# Patient Record
Sex: FEMALE | Race: AMERICAN INDIAN OR ALASKA NATIVE | NOT HISPANIC OR LATINO | URBAN - METROPOLITAN AREA
[De-identification: names, ages, dates, MRNs, and addresses within clinical notes are randomized per-mention and may not be internally consistent; named-entity substitution may affect disease eponyms.]

---

## 2022-03-02 ENCOUNTER — INPATIENT (INPATIENT)
Facility: HOSPITAL | Age: 87
LOS: 6 days | Discharge: HOME | DRG: 291 | End: 2022-03-09
Attending: HOSPITALIST | Admitting: HOSPITALIST
Payer: COMMERCIAL

## 2022-03-02 VITALS
HEART RATE: 55 BPM | SYSTOLIC BLOOD PRESSURE: 149 MMHG | RESPIRATION RATE: 18 BRPM | HEIGHT: 60 IN | OXYGEN SATURATION: 94 % | TEMPERATURE: 98 F | DIASTOLIC BLOOD PRESSURE: 74 MMHG

## 2022-03-02 PROCEDURE — 99285 EMERGENCY DEPT VISIT HI MDM: CPT

## 2022-03-02 PROCEDURE — 71045 X-RAY EXAM CHEST 1 VIEW: CPT | Mod: 26

## 2022-03-02 RX ORDER — SODIUM CHLORIDE 9 MG/ML
3 INJECTION INTRAMUSCULAR; INTRAVENOUS; SUBCUTANEOUS EVERY 8 HOURS
Refills: 0 | Status: DISCONTINUED | OUTPATIENT
Start: 2022-03-02 | End: 2022-03-09

## 2022-03-02 NOTE — ED PROVIDER NOTE - OBJECTIVE STATEMENT
89 y/o female with history of DM, HTN, CVA, no history of heart problems or blood clots, p/w several days of worsening shortness of breath with some intermittent diarrhea and no other associated symptoms. Patient noted to have O2 saturation of 90 at home prompting ER visit. Patient denies fever, chest pain, cough, nausea, vomiting, diarrhea, urinary symptoms, focal numbness/weakness syncope, or any other recent illnesses and hospitalizations.

## 2022-03-02 NOTE — ED PROVIDER NOTE - CLINICAL SUMMARY MEDICAL DECISION MAKING FREE TEXT BOX
Patient p/w shortness of breath, exam consistent with CHF, will investigate other etiologies. Will get EKG, labs, and CXR. Patient stable and will reassess. Patient p/w shortness of breath, exam consistent with CHF, will investigate other etiologies. Will get EKG, labs, and CXR. Patient stable and will reassess.    CXR showing edema with bilat effusions. Labs showing Hb 7.8,  otw unremarkable. EKG unremarkable. Treating for pulm edema. Pt stable and endorsed to inpatient team.

## 2022-03-02 NOTE — ED PROVIDER NOTE - PHYSICAL EXAMINATION
Vital Signs Reviewed--86% on RA, 95% on 3L  GEN: Comfortable, NAD, AAOx3  HEENT: NCAT, MMM, Neck Supple  RESP: Diffuse bilateral rales with mild tachypnea speaking in full sentences  CV: RRR, S1S2, No murmurs  ABD: No TTP, Soft, ND, No masses, No CVA Tenderness  Extrem/Skin: 2+ pitting edema in bilateral lower legs. Equal pulses bilat, No cyanosis/rashes  Neuro: No focal deficits

## 2022-03-03 DIAGNOSIS — J96.01 ACUTE RESPIRATORY FAILURE WITH HYPOXIA: ICD-10-CM

## 2022-03-03 DIAGNOSIS — Z29.9 ENCOUNTER FOR PROPHYLACTIC MEASURES, UNSPECIFIED: ICD-10-CM

## 2022-03-03 DIAGNOSIS — D64.9 ANEMIA, UNSPECIFIED: ICD-10-CM

## 2022-03-03 DIAGNOSIS — I63.9 CEREBRAL INFARCTION, UNSPECIFIED: ICD-10-CM

## 2022-03-03 DIAGNOSIS — J81.1 CHRONIC PULMONARY EDEMA: ICD-10-CM

## 2022-03-03 DIAGNOSIS — R09.89 OTHER SPECIFIED SYMPTOMS AND SIGNS INVOLVING THE CIRCULATORY AND RESPIRATORY SYSTEMS: ICD-10-CM

## 2022-03-03 DIAGNOSIS — E11.9 TYPE 2 DIABETES MELLITUS WITHOUT COMPLICATIONS: ICD-10-CM

## 2022-03-03 DIAGNOSIS — I50.9 HEART FAILURE, UNSPECIFIED: ICD-10-CM

## 2022-03-03 DIAGNOSIS — I10 ESSENTIAL (PRIMARY) HYPERTENSION: ICD-10-CM

## 2022-03-03 LAB
A1C WITH ESTIMATED AVERAGE GLUCOSE RESULT: 6.3 % — HIGH (ref 4–5.6)
ALBUMIN SERPL ELPH-MCNC: 3 G/DL — LOW (ref 3.5–5)
ALBUMIN SERPL ELPH-MCNC: 3.3 G/DL — LOW (ref 3.5–5)
ALP SERPL-CCNC: 104 U/L — SIGNIFICANT CHANGE UP (ref 40–120)
ALP SERPL-CCNC: 98 U/L — SIGNIFICANT CHANGE UP (ref 40–120)
ALT FLD-CCNC: 14 U/L DA — SIGNIFICANT CHANGE UP (ref 10–60)
ALT FLD-CCNC: 15 U/L DA — SIGNIFICANT CHANGE UP (ref 10–60)
ANION GAP SERPL CALC-SCNC: 6 MMOL/L — SIGNIFICANT CHANGE UP (ref 5–17)
ANION GAP SERPL CALC-SCNC: 7 MMOL/L — SIGNIFICANT CHANGE UP (ref 5–17)
APTT BLD: 26.3 SEC — LOW (ref 27.5–35.5)
APTT BLD: 29.5 SEC — SIGNIFICANT CHANGE UP (ref 27.5–35.5)
AST SERPL-CCNC: 12 U/L — SIGNIFICANT CHANGE UP (ref 10–40)
AST SERPL-CCNC: 16 U/L — SIGNIFICANT CHANGE UP (ref 10–40)
BASOPHILS # BLD AUTO: 0.03 K/UL — SIGNIFICANT CHANGE UP (ref 0–0.2)
BASOPHILS # BLD AUTO: 0.04 K/UL — SIGNIFICANT CHANGE UP (ref 0–0.2)
BASOPHILS NFR BLD AUTO: 0.4 % — SIGNIFICANT CHANGE UP (ref 0–2)
BASOPHILS NFR BLD AUTO: 0.5 % — SIGNIFICANT CHANGE UP (ref 0–2)
BILIRUB SERPL-MCNC: 0.5 MG/DL — SIGNIFICANT CHANGE UP (ref 0.2–1.2)
BILIRUB SERPL-MCNC: 0.6 MG/DL — SIGNIFICANT CHANGE UP (ref 0.2–1.2)
BLD GP AB SCN SERPL QL: SIGNIFICANT CHANGE UP
BUN SERPL-MCNC: 23 MG/DL — HIGH (ref 7–18)
BUN SERPL-MCNC: 25 MG/DL — HIGH (ref 7–18)
CALCIUM SERPL-MCNC: 8.7 MG/DL — SIGNIFICANT CHANGE UP (ref 8.4–10.5)
CALCIUM SERPL-MCNC: 8.8 MG/DL — SIGNIFICANT CHANGE UP (ref 8.4–10.5)
CHLORIDE SERPL-SCNC: 101 MMOL/L — SIGNIFICANT CHANGE UP (ref 96–108)
CHLORIDE SERPL-SCNC: 102 MMOL/L — SIGNIFICANT CHANGE UP (ref 96–108)
CHOLEST SERPL-MCNC: 160 MG/DL — SIGNIFICANT CHANGE UP
CO2 SERPL-SCNC: 23 MMOL/L — SIGNIFICANT CHANGE UP (ref 22–31)
CO2 SERPL-SCNC: 23 MMOL/L — SIGNIFICANT CHANGE UP (ref 22–31)
CREAT SERPL-MCNC: 0.86 MG/DL — SIGNIFICANT CHANGE UP (ref 0.5–1.3)
CREAT SERPL-MCNC: 0.88 MG/DL — SIGNIFICANT CHANGE UP (ref 0.5–1.3)
D DIMER BLD IA.RAPID-MCNC: 483 NG/ML DDU — HIGH
EGFR: 63 ML/MIN/1.73M2 — SIGNIFICANT CHANGE UP
EGFR: 65 ML/MIN/1.73M2 — SIGNIFICANT CHANGE UP
EOSINOPHIL # BLD AUTO: 0.23 K/UL — SIGNIFICANT CHANGE UP (ref 0–0.5)
EOSINOPHIL # BLD AUTO: 0.28 K/UL — SIGNIFICANT CHANGE UP (ref 0–0.5)
EOSINOPHIL NFR BLD AUTO: 2.7 % — SIGNIFICANT CHANGE UP (ref 0–6)
EOSINOPHIL NFR BLD AUTO: 3.7 % — SIGNIFICANT CHANGE UP (ref 0–6)
ESTIMATED AVERAGE GLUCOSE: 134 MG/DL — HIGH (ref 68–114)
FERRITIN SERPL-MCNC: 14 NG/ML — LOW (ref 15–150)
GLUCOSE SERPL-MCNC: 148 MG/DL — HIGH (ref 70–99)
GLUCOSE SERPL-MCNC: 99 MG/DL — SIGNIFICANT CHANGE UP (ref 70–99)
HCT VFR BLD CALC: 24.6 % — LOW (ref 34.5–45)
HCT VFR BLD CALC: 25.2 % — LOW (ref 34.5–45)
HDLC SERPL-MCNC: 96 MG/DL — SIGNIFICANT CHANGE UP
HGB BLD-MCNC: 7.8 G/DL — LOW (ref 11.5–15.5)
HGB BLD-MCNC: 8 G/DL — LOW (ref 11.5–15.5)
IMM GRANULOCYTES NFR BLD AUTO: 0.7 % — SIGNIFICANT CHANGE UP (ref 0–1.5)
IMM GRANULOCYTES NFR BLD AUTO: 0.8 % — SIGNIFICANT CHANGE UP (ref 0–1.5)
INR BLD: 1 RATIO — SIGNIFICANT CHANGE UP (ref 0.88–1.16)
INR BLD: 1.03 RATIO — SIGNIFICANT CHANGE UP (ref 0.88–1.16)
IRON SATN MFR SERPL: 29 UG/DL — LOW (ref 40–150)
IRON SATN MFR SERPL: 8 % — LOW (ref 15–50)
LDH SERPL L TO P-CCNC: 184 U/L — SIGNIFICANT CHANGE UP (ref 120–225)
LIPID PNL WITH DIRECT LDL SERPL: 57 MG/DL — SIGNIFICANT CHANGE UP
LYMPHOCYTES # BLD AUTO: 1.13 K/UL — SIGNIFICANT CHANGE UP (ref 1–3.3)
LYMPHOCYTES # BLD AUTO: 1.28 K/UL — SIGNIFICANT CHANGE UP (ref 1–3.3)
LYMPHOCYTES # BLD AUTO: 15 % — SIGNIFICANT CHANGE UP (ref 13–44)
LYMPHOCYTES # BLD AUTO: 15.1 % — SIGNIFICANT CHANGE UP (ref 13–44)
MAGNESIUM SERPL-MCNC: 2 MG/DL — SIGNIFICANT CHANGE UP (ref 1.6–2.6)
MCHC RBC-ENTMCNC: 28.2 PG — SIGNIFICANT CHANGE UP (ref 27–34)
MCHC RBC-ENTMCNC: 29.2 PG — SIGNIFICANT CHANGE UP (ref 27–34)
MCHC RBC-ENTMCNC: 31 GM/DL — LOW (ref 32–36)
MCHC RBC-ENTMCNC: 32.5 GM/DL — SIGNIFICANT CHANGE UP (ref 32–36)
MCV RBC AUTO: 89.8 FL — SIGNIFICANT CHANGE UP (ref 80–100)
MCV RBC AUTO: 91 FL — SIGNIFICANT CHANGE UP (ref 80–100)
MONOCYTES # BLD AUTO: 0.77 K/UL — SIGNIFICANT CHANGE UP (ref 0–0.9)
MONOCYTES # BLD AUTO: 0.79 K/UL — SIGNIFICANT CHANGE UP (ref 0–0.9)
MONOCYTES NFR BLD AUTO: 10.2 % — SIGNIFICANT CHANGE UP (ref 2–14)
MONOCYTES NFR BLD AUTO: 9.3 % — SIGNIFICANT CHANGE UP (ref 2–14)
NEUTROPHILS # BLD AUTO: 5.26 K/UL — SIGNIFICANT CHANGE UP (ref 1.8–7.4)
NEUTROPHILS # BLD AUTO: 6.09 K/UL — SIGNIFICANT CHANGE UP (ref 1.8–7.4)
NEUTROPHILS NFR BLD AUTO: 69.8 % — SIGNIFICANT CHANGE UP (ref 43–77)
NEUTROPHILS NFR BLD AUTO: 71.8 % — SIGNIFICANT CHANGE UP (ref 43–77)
NON HDL CHOLESTEROL: 64 MG/DL — SIGNIFICANT CHANGE UP
NRBC # BLD: 0 /100 WBCS — SIGNIFICANT CHANGE UP (ref 0–0)
NRBC # BLD: 0 /100 WBCS — SIGNIFICANT CHANGE UP (ref 0–0)
NT-PROBNP SERPL-SCNC: 915 PG/ML — HIGH (ref 0–450)
PHOSPHATE SERPL-MCNC: 4.7 MG/DL — HIGH (ref 2.5–4.5)
PLATELET # BLD AUTO: 393 K/UL — SIGNIFICANT CHANGE UP (ref 150–400)
PLATELET # BLD AUTO: 404 K/UL — HIGH (ref 150–400)
POTASSIUM SERPL-MCNC: 4.7 MMOL/L — SIGNIFICANT CHANGE UP (ref 3.5–5.3)
POTASSIUM SERPL-MCNC: 5.2 MMOL/L — SIGNIFICANT CHANGE UP (ref 3.5–5.3)
POTASSIUM SERPL-SCNC: 4.7 MMOL/L — SIGNIFICANT CHANGE UP (ref 3.5–5.3)
POTASSIUM SERPL-SCNC: 5.2 MMOL/L — SIGNIFICANT CHANGE UP (ref 3.5–5.3)
PROT SERPL-MCNC: 6.7 G/DL — SIGNIFICANT CHANGE UP (ref 6–8.3)
PROT SERPL-MCNC: 6.9 G/DL — SIGNIFICANT CHANGE UP (ref 6–8.3)
PROTHROM AB SERPL-ACNC: 11.9 SEC — SIGNIFICANT CHANGE UP (ref 10.5–13.4)
PROTHROM AB SERPL-ACNC: 12.3 SEC — SIGNIFICANT CHANGE UP (ref 10.5–13.4)
RBC # BLD: 2.74 M/UL — LOW (ref 3.8–5.2)
RBC # BLD: 2.74 M/UL — LOW (ref 3.8–5.2)
RBC # BLD: 2.77 M/UL — LOW (ref 3.8–5.2)
RBC # FLD: 13.9 % — SIGNIFICANT CHANGE UP (ref 10.3–14.5)
RBC # FLD: 13.9 % — SIGNIFICANT CHANGE UP (ref 10.3–14.5)
RETICS #: 58.9 K/UL — SIGNIFICANT CHANGE UP (ref 25–125)
RETICS/RBC NFR: 2.2 % — SIGNIFICANT CHANGE UP (ref 0.5–2.5)
SARS-COV-2 RNA SPEC QL NAA+PROBE: SIGNIFICANT CHANGE UP
SODIUM SERPL-SCNC: 131 MMOL/L — LOW (ref 135–145)
SODIUM SERPL-SCNC: 131 MMOL/L — LOW (ref 135–145)
TIBC SERPL-MCNC: 374 UG/DL — SIGNIFICANT CHANGE UP (ref 250–450)
TRIGL SERPL-MCNC: 36 MG/DL — SIGNIFICANT CHANGE UP
TROPONIN I, HIGH SENSITIVITY RESULT: 22.3 NG/L — SIGNIFICANT CHANGE UP
UIBC SERPL-MCNC: 345 UG/DL — SIGNIFICANT CHANGE UP (ref 110–370)
WBC # BLD: 7.54 K/UL — SIGNIFICANT CHANGE UP (ref 3.8–10.5)
WBC # BLD: 8.48 K/UL — SIGNIFICANT CHANGE UP (ref 3.8–10.5)
WBC # FLD AUTO: 7.54 K/UL — SIGNIFICANT CHANGE UP (ref 3.8–10.5)
WBC # FLD AUTO: 8.48 K/UL — SIGNIFICANT CHANGE UP (ref 3.8–10.5)

## 2022-03-03 PROCEDURE — 93306 TTE W/DOPPLER COMPLETE: CPT | Mod: 26

## 2022-03-03 PROCEDURE — 99223 1ST HOSP IP/OBS HIGH 75: CPT

## 2022-03-03 PROCEDURE — 93970 EXTREMITY STUDY: CPT | Mod: 26

## 2022-03-03 RX ORDER — FUROSEMIDE 40 MG
80 TABLET ORAL ONCE
Refills: 0 | Status: COMPLETED | OUTPATIENT
Start: 2022-03-03 | End: 2022-03-03

## 2022-03-03 RX ORDER — ENOXAPARIN SODIUM 100 MG/ML
40 INJECTION SUBCUTANEOUS EVERY 24 HOURS
Refills: 0 | Status: DISCONTINUED | OUTPATIENT
Start: 2022-03-04 | End: 2022-03-09

## 2022-03-03 RX ORDER — ENOXAPARIN SODIUM 100 MG/ML
60 INJECTION SUBCUTANEOUS EVERY 12 HOURS
Refills: 0 | Status: DISCONTINUED | OUTPATIENT
Start: 2022-03-03 | End: 2022-03-03

## 2022-03-03 RX ORDER — CLOPIDOGREL BISULFATE 75 MG/1
75 TABLET, FILM COATED ORAL DAILY
Refills: 0 | Status: DISCONTINUED | OUTPATIENT
Start: 2022-03-03 | End: 2022-03-09

## 2022-03-03 RX ORDER — FUROSEMIDE 40 MG
40 TABLET ORAL
Refills: 0 | Status: DISCONTINUED | OUTPATIENT
Start: 2022-03-03 | End: 2022-03-05

## 2022-03-03 RX ADMIN — Medication 80 MILLIGRAM(S): at 01:52

## 2022-03-03 RX ADMIN — ENOXAPARIN SODIUM 40 MILLIGRAM(S): 100 INJECTION SUBCUTANEOUS at 23:48

## 2022-03-03 RX ADMIN — SODIUM CHLORIDE 3 MILLILITER(S): 9 INJECTION INTRAMUSCULAR; INTRAVENOUS; SUBCUTANEOUS at 14:13

## 2022-03-03 RX ADMIN — SODIUM CHLORIDE 3 MILLILITER(S): 9 INJECTION INTRAMUSCULAR; INTRAVENOUS; SUBCUTANEOUS at 05:38

## 2022-03-03 RX ADMIN — ENOXAPARIN SODIUM 60 MILLIGRAM(S): 100 INJECTION SUBCUTANEOUS at 12:47

## 2022-03-03 RX ADMIN — SODIUM CHLORIDE 3 MILLILITER(S): 9 INJECTION INTRAMUSCULAR; INTRAVENOUS; SUBCUTANEOUS at 23:33

## 2022-03-03 RX ADMIN — Medication 40 MILLIGRAM(S): at 18:24

## 2022-03-03 NOTE — PATIENT PROFILE ADULT - CAREGIVER NAME
Naman PGY2: Patient approached for NGT placement, states he feels improved and wants to decline NGT at this time unless pain reoccurs, abd now softer, still very mild distension, surgery eval pending Reji Tejada Naman PGY2: surgery at bedside evaluating pt Naman PGY2: Patient seen by surgery, pain is resolved and pt ambulatory without difficulty, abd soft, ntnd, per Naman PGY2: Patient noted increased pain 4-5/10, unable to po challenge, relayed to surgery concern for pain, lack of passage of flatus, possible persisting sbo, per surgery will come re-eval pt

## 2022-03-03 NOTE — CONSULT NOTE ADULT - PROBLEM SELECTOR RECOMMENDATION 4
h/o HTN, on unknown dose of lisinopril and amlodipine    - hold home medications for now  - monitor BP

## 2022-03-03 NOTE — H&P ADULT - RS GEN PE MLT RESP DETAILS PC
respirations non-labored/no intercostal retractions/diminished breath sounds, L/diminished breath sounds, R

## 2022-03-03 NOTE — CHART NOTE - NSCHARTNOTEFT_GEN_A_CORE
81 year old female  with PMH of DM2, HTN, ischemic CVA presented with  progressive SOB and B/L LE swelling for few weeks   In ED: Pro BNP - 915  CXR B/L pleural effusion and widespread pulmonary vascular congestion.  hypoxic, placed on supplemental oxygen   Troponin negative     Admitted for acute hypoxic respiratory failure requiring oxygen likely new onset of CHF   Monitored on telemetry. Started on lasix             OBJECTIVE:  Vital Signs Last 24 Hrs  T(C): 36.3 (03 Mar 2022 07:01), Max: 36.4 (02 Mar 2022 21:03)  T(F): 97.4 (03 Mar 2022 07:01), Max: 97.6 (02 Mar 2022 21:03)  HR: 76 (03 Mar 2022 07:01) (55 - 76)  BP: 166/72 (03 Mar 2022 07:01) (149/64 - 166/72)  BP(mean): --  RR: 18 (03 Mar 2022 07:01) (18 - 18)  SpO2: 100% (03 Mar 2022 07:01) (94% - 100%)    FOCUSED PHYSICAL EXAM:  Neuro: awake, alert, oriented x 2, disoriented to time, [place], No neuro deficit  Cardiovascular: Pulses +2 B/L in lower and upper extremities, HR regular, BP stable, + LE edema.  Respiratory: Respirations regular, unlabored, breath sounds decreased bilaterally   GI: Abdomen soft, non-tender, positive bowel sounds.  : no bladder distention noted. No complaints at this time.  Skin: Dry, intact, no bruising, no diaphoresis.    PLAN       - Admit to telemetry   - IV diuresis: Lasix 40mg q 12 hourly  - follow up Ddimer, if elevated will do CTA ro PE   - Check daily weights  - monitor I/O,  fluid intake restriction   - follow up ECHO   - B/L US duplex ro  DVT   - monitor  H/H, f/u FOBT   - Cardiology consulted - Dr Tovar   - c/w home meds. 81 year old female  with PMH of DM2, HTN, ischemic CVA presented with  progressive SOB and B/L LE swelling for few weeks   In ED: Pro BNP - 915  CXR B/L pleural effusion and widespread pulmonary vascular congestion.  hypoxic, placed on supplemental oxygen   Troponin negative     Admitted for acute hypoxic respiratory failure requiring oxygen likely new onset of CHF   Monitored on telemetry. Started on lasix   Pending ddimer and venous doppler to ro DVT, started empirically on full does lovenox             OBJECTIVE:  Vital Signs Last 24 Hrs  T(C): 36.3 (03 Mar 2022 07:01), Max: 36.4 (02 Mar 2022 21:03)  T(F): 97.4 (03 Mar 2022 07:01), Max: 97.6 (02 Mar 2022 21:03)  HR: 76 (03 Mar 2022 07:01) (55 - 76)  BP: 166/72 (03 Mar 2022 07:01) (149/64 - 166/72)  BP(mean): --  RR: 18 (03 Mar 2022 07:01) (18 - 18)  SpO2: 100% (03 Mar 2022 07:01) (94% - 100%)    FOCUSED PHYSICAL EXAM:  Neuro: awake, alert, oriented x 2, disoriented to time, [place], No neuro deficit  Cardiovascular: Pulses +2 B/L in lower and upper extremities, HR regular, BP stable, + LE edema.  Respiratory: Respirations regular, unlabored, breath sounds decreased bilaterally   GI: Abdomen soft, non-tender, positive bowel sounds.  : no bladder distention noted. No complaints at this time.  Skin: Dry, intact, no bruising, no diaphoresis.    PLAN       - Admit to telemetry   - IV diuresis: Lasix 40mg q 12 hourly  - follow up Ddimer, if elevated will do CTA ro PE   - Check daily weights  - monitor I/O,  fluid intake restriction   - follow up ECHO   - B/L US duplex ro  DVT   - Lovenox 60 mg BID for now   - monitor  H/H, f/u FOBT   - Cardiology consulted - Dr Tovar   - c/w [place] meds. 81 year old female  with PMH of DM2, HTN, ischemic CVA presented with  progressive SOB and B/L LE swelling for few weeks   In ED: Pro BNP - 915  CXR B/L pleural effusion and widespread pulmonary vascular congestion.  hypoxic, placed on supplemental oxygen   Troponin negative     Admitted for acute hypoxic respiratory failure requiring oxygen likely new onset of CHF   Monitored on telemetry. Started on lasix   Pending ddimer and venous doppler to ro DVT, started empirically on full does lovenox             OBJECTIVE:  Vital Signs Last 24 Hrs  T(C): 36.3 (03 Mar 2022 07:01), Max: 36.4 (02 Mar 2022 21:03)  T(F): 97.4 (03 Mar 2022 07:01), Max: 97.6 (02 Mar 2022 21:03)  HR: 76 (03 Mar 2022 07:01) (55 - 76)  BP: 166/72 (03 Mar 2022 07:01) (149/64 - 166/72)  BP(mean): --  RR: 18 (03 Mar 2022 07:01) (18 - 18)  SpO2: 100% (03 Mar 2022 07:01) (94% - 100%)    FOCUSED PHYSICAL EXAM:  Neuro: awake, alert, oriented x 2, disoriented to time, [place], No neuro deficit  Cardiovascular: Pulses +2 B/L in lower and upper extremities, HR regular, BP stable, + LE edema.  Respiratory: Respirations regular, unlabored, breath sounds decreased bilaterally   GI: Abdomen soft, non-tender, positive bowel sounds.  : no bladder distention noted. No complaints at this time.  Skin: Dry, intact, no bruising, no diaphoresis.    PLAN       - Admit to telemetry   - IV diuresis: Lasix 40mg q 12 hourly  - follow up Ddimer, if elevated will do CTA ro PE   - Check daily weights  - monitor I/O,  fluid intake restriction   - follow up ECHO   - B/L US duplex ro  DVT   - Lovenox 60 mg BID for now   - monitor  H/H, f/u FOBT   - Cardiology consulted - Dr Tovar   - c/w home meds.    Attending addendum:  Patient was seen and examined at bedside. Reports SOB has improved slightly from yesterday. Reports orthopnea and PND at home.     Vitals noted     P/E:  NAD, laying flat on bed   on 2L NC saturating 100%  AAOx3, no new focal deficit   BL crackles, right >left   Abd soft, non tender, BS present   S1S2 WNL, no MRG   BL LE 1+ edema   Fernando catheter in place    Labs noted     A/P:  Acute hypoxic respiratory failure: cont NC as needed to keep O2 sat>94%  Possible new onset of HF: S/s, CXR, elevated BNP. Will cont Lasix 40mg BID, strict I/O, daily weight, water and salt restriction, cont tele, pending TTE, appreciate cardio consult  R/O DVT/PE: D-dimer (age adjusted) not elevated, LE doppler neg, symptoms improved with Lasix. Changed Lovenox to ppx dose   Please send anemia w/u, obtain CT abd and pelvis without contrast to r/o malignancy  Hb stable, cont home dose Plavix for h/o stroke  Rest of the management as above

## 2022-03-03 NOTE — H&P ADULT - ASSESSMENT
Patient is a 89yoF, AAOx3 and ambulates w/ walker, w/ PMH of CVA, HTN, DM, p/w SOB for a week. Admitted for acute hypoxic respiratory failure requiring oxygen supplementation

## 2022-03-03 NOTE — H&P ADULT - MUSCULOSKELETAL
detailed exam ROM intact/no joint swelling/no joint erythema/no joint warmth/normal strength/calf tenderness

## 2022-03-03 NOTE — PATIENT PROFILE ADULT - NSTRANSFERBELONGINGSDISPO_GEN_A_NUR
Family will bring in items tomorrow. Advised to show to staff for property accountability./not applicable

## 2022-03-03 NOTE — CONSULT NOTE ADULT - PROBLEM SELECTOR RECOMMENDATION 2
Patient with acute hypoxic respiratory failure associated with bilateral pitting edema, saturating well on 4 Lpm via NC  no known history of CHF  ProBNP 915, CXR with pulmonary vascular congestion  s/p IV lasix 80 mg In ED    new-onset CHF vs PE?  s/p IV lasix 80 mg in ED  started on IV lasix 40 mg bid    pending US duplex b/l LE r/o DVT and TTE Patient with acute hypoxic respiratory failure associated with bilateral pitting edema, saturating well on 4 Lpm via NC  no known history of CHF  ProBNP 915, CXR with pulmonary vascular congestion  s/p IV lasix 80 mg In ED    new-onset CHF vs PE? - pt with many risk factors to develop CHF including anemia, hypertension  s/p IV lasix 80 mg in ED  started on IV lasix 40 mg bid    - Obtain TTE to evaluate for new onset CHF  - Continue IV diuresis  - daily weights, strict I&Os, fluid restriction

## 2022-03-03 NOTE — H&P ADULT - PROBLEM SELECTOR PLAN 4
- h/o CVA, on plavix at home  - hold plavix given anemia - h/o HTN, on unknown dose of lisinopril and amlodipine  - hold home medications for now  - primary team to follow up on home dose

## 2022-03-03 NOTE — PATIENT PROFILE ADULT - FALL HARM RISK - HARM RISK INTERVENTIONS

## 2022-03-03 NOTE — H&P ADULT - PROBLEM SELECTOR PLAN 5
- h/o DM, recently d/c on metfomin by PCP per pt  - c/w ISS  - FS qACHs  - f/u a1c - h/o CVA, on plavix at home  - hold plavix given anemia

## 2022-03-03 NOTE — H&P ADULT - PROBLEM SELECTOR PLAN 1
- p/w progressive SOB x1wk  - ddx: PE vs new-onset CHF vs symptomatic anemia  - PE: decreased breath sounds b/l, pitting edema b/l (R>L), positive rashard sign  - hgb 7.8 (unknown baseline)  -   - CXR pulmonary congestion (f/u official reading)  - s/p furosemide 80 in ED  - c/w oxygen supp  - telemonitoring  - f/u dimer, LE doppler, FOBT, retic, bili    - Cardio, Dr. Tovar, consulted  - hold empiric AC given anemia  - primary team obtain CTA if indicated

## 2022-03-03 NOTE — ED ADULT NURSE NOTE - OBJECTIVE STATEMENT
Pt presents to the ED with c/o SOB today. As per daughter-in-law, pt's O2 dropped to 90% on RA at home. Pt denies fever, chest pain, nausea, or vomiting.

## 2022-03-03 NOTE — CONSULT NOTE ADULT - SUBJECTIVE AND OBJECTIVE BOX
CHIEF COMPLAINT & BRIEF HOSPITAL COURSE:  89yoF, AAOx3 and ambulates w/ walker, w/ PMH of CVA, HTN, DM, p/w SOB for a week. She reports progressive SOB. She states she was unable to lay flat due to difficulty of breathing, and she is unable to walk due to SOB. She also endorses recent swelling of her leg bilaterally (R>L). She states no trigger events, which includes no recent sickness. She denies fever, chills, n/v, chest pain, abdominal pain, urinary or bowel movement changes.    Subjective:    REVIEW OF SYSTEMS:  CONSTITUTIONAL: No fever, weight loss, or fatigue  RESPIRATORY: No cough, wheezing, chills or hemoptysis; No shortness of breath  CARDIOVASCULAR: No chest pain, palpitations, dizziness, or leg swelling  GASTROINTESTINAL: No abdominal pain. No nausea, vomiting, or hematemesis; No diarrhea or constipation. No melena or hematochezia.  GENITOURINARY: No dysuria or hematuria, urinary frequency  NEUROLOGICAL: No headaches, memory loss, loss of strength, numbness, or tremors  SKIN: No itching, burning, rashes, or lesions     MEDICATIONS  (STANDING):  furosemide   Injectable 40 milliGRAM(s) IV Push two times a day  sodium chloride 0.9% lock flush 3 milliLiter(s) IV Push every 8 hours    MEDICATIONS  (PRN):      Vital Signs Last 24 Hrs  T(C): 36.3 (03 Mar 2022 07:01), Max: 36.4 (02 Mar 2022 21:03)  T(F): 97.4 (03 Mar 2022 07:01), Max: 97.6 (02 Mar 2022 21:03)  HR: 76 (03 Mar 2022 07:01) (55 - 76)  BP: 166/72 (03 Mar 2022 07:01) (149/64 - 166/72)  BP(mean): --  RR: 18 (03 Mar 2022 07:01) (18 - 18)  SpO2: 100% (03 Mar 2022 07:01) (94% - 100%)    PHYSICAL EXAMINATION:  GENERAL: NAD, well built  HEAD:  Atraumatic, Normocephalic  EYES:  conjunctiva and sclera clear  NECK: Supple, No JVD, Normal thyroid  CHEST/LUNG: Clear to auscultation. Clear to percussion bilaterally; No rales, rhonchi, wheezing, or rubs  HEART: Regular rate and rhythm; No murmurs, rubs, or gallops  ABDOMEN: Soft, Nontender, Nondistended; Bowel sounds present, no pain or masses on palpation  NERVOUS SYSTEM:  Alert & Oriented X3  : voiding well  EXTREMITIES:  2+ Peripheral Pulses, No clubbing, cyanosis, or edema  SKIN: warm dry                          7.8    8.48  )-----------( 393      ( 03 Mar 2022 00:44 )             25.2     03-03    131<L>  |  102  |  25<H>  ----------------------------<  99  5.2   |  23  |  0.88    Ca    8.7      03 Mar 2022 00:44    TPro  6.9  /  Alb  3.3<L>  /  TBili  0.5  /  DBili  x   /  AST  16  /  ALT  15  /  AlkPhos  104  03-03    LIVER FUNCTIONS - ( 03 Mar 2022 00:44 )  Alb: 3.3 g/dL / Pro: 6.9 g/dL / ALK PHOS: 104 U/L / ALT: 15 U/L DA / AST: 16 U/L / GGT: x               PT/INR - ( 03 Mar 2022 00:44 )   PT: 11.9 sec;   INR: 1.00 ratio         PTT - ( 03 Mar 2022 00:44 )  PTT:29.5 sec    I&O's Summary          CAPILLARY BLOOD GLUCOSE      RADIOLOGY & ADDITIONAL TESTS:                     CHIEF COMPLAINT & BRIEF HOSPITAL COURSE:  89yoF, AAOx3 and ambulates w/ walker, w/ PMH of CVA, HTN, DM, p/w SOB for a week. She reports progressive SOB. She states she was unable to lay flat due to difficulty of breathing, and she is unable to walk due to SOB. She also endorses recent swelling of her leg bilaterally (R>L). She states no trigger events, which includes no recent sickness. She denies fever, chills, n/v, chest pain, abdominal pain, urinary or bowel movement changes.    Subjective: Reports improved shortness of breath on oxygen. Also mentions right lower leg pain with touch. Denies other complaints    REVIEW OF SYSTEMS:  CONSTITUTIONAL: No fever, weight loss, or fatigue  RESPIRATORY: No cough, wheezing, chills or hemoptysis; No shortness of breath  CARDIOVASCULAR: No chest pain, palpitations, dizziness, or leg swelling  GASTROINTESTINAL: No abdominal pain. No nausea, vomiting, or hematemesis; No diarrhea or constipation. No melena or hematochezia.  GENITOURINARY: No dysuria or hematuria, urinary frequency  NEUROLOGICAL: No headaches, memory loss, loss of strength, numbness, or tremors  SKIN: No itching, burning, rashes, or lesions     MEDICATIONS  (STANDING):  furosemide   Injectable 40 milliGRAM(s) IV Push two times a day  sodium chloride 0.9% lock flush 3 milliLiter(s) IV Push every 8 hours    MEDICATIONS  (PRN):      Vital Signs Last 24 Hrs  T(C): 36.3 (03 Mar 2022 07:01), Max: 36.4 (02 Mar 2022 21:03)  T(F): 97.4 (03 Mar 2022 07:01), Max: 97.6 (02 Mar 2022 21:03)  HR: 76 (03 Mar 2022 07:01) (55 - 76)  BP: 166/72 (03 Mar 2022 07:01) (149/64 - 166/72)  BP(mean): --  RR: 18 (03 Mar 2022 07:01) (18 - 18)  SpO2: 100% (03 Mar 2022 07:01) (94% - 100%)    PHYSICAL EXAMINATION:  GENERAL: NAD, well built, elderly female on nasal cannula  HEAD:  Atraumatic, Normocephalic  EYES:  conjunctiva and sclera clear  NECK: Supple, No JVD, Normal thyroid  CHEST/LUNG: Clear to auscultation b/l  HEART: Regular rate and rhythm; No murmurs, rubs, or gallops  ABDOMEN: Soft, Nontender, Nondistended; Bowel sounds present, no pain or masses on palpation  NERVOUS SYSTEM:  Alert & Oriented X3  : voiding well  EXTREMITIES:  2+ Peripheral Pulses, No clubbing, cyanosis, 2+ pitting edema RLE to mid shin with erythema, tenderness to palpation, + homans sign. and 1+ edema LLE without erythema or tenderness to palpation  SKIN: warm dry                          7.8    8.48  )-----------( 393      ( 03 Mar 2022 00:44 )             25.2     03-03    131<L>  |  102  |  25<H>  ----------------------------<  99  5.2   |  23  |  0.88    Ca    8.7      03 Mar 2022 00:44    TPro  6.9  /  Alb  3.3<L>  /  TBili  0.5  /  DBili  x   /  AST  16  /  ALT  15  /  AlkPhos  104  03-03    LIVER FUNCTIONS - ( 03 Mar 2022 00:44 )  Alb: 3.3 g/dL / Pro: 6.9 g/dL / ALK PHOS: 104 U/L / ALT: 15 U/L DA / AST: 16 U/L / GGT: x               PT/INR - ( 03 Mar 2022 00:44 )   PT: 11.9 sec;   INR: 1.00 ratio         PTT - ( 03 Mar 2022 00:44 )  PTT:29.5 sec    I&O's Summary          CAPILLARY BLOOD GLUCOSE      RADIOLOGY & ADDITIONAL TESTS:

## 2022-03-03 NOTE — H&P ADULT - HISTORY OF PRESENT ILLNESS
Patient is a 89yoF, AAOx3 and ambulates w/ walker, w/ PMH of CVA, HTN, DM, p/w SOB for a week. She reports progressive SOB. She states she was unable to lay flat due to difficulty of breathing, and she is unable to walk due to SOB. She also endorses recent swelling of her leg bilaterally (R>L). She states no trigger events, which includes no recent sickness. She denies fever, chills, n/v, chest pain, abdominal pain, urinary or bowel movement changes.

## 2022-03-03 NOTE — CONSULT NOTE ADULT - PROBLEM SELECTOR RECOMMENDATION 9
Shortness of breath for 1 week associated with bilateral pitting edema and + homans sign  hypoxia on admission, now saturating at 100% on 4 Lpm  ProBNP 915  CXR with pulmonary vascular congestion    new-onset CHF vs PE?  s/p IV lasix 80 mg in ED  started on IV lasix 40 mg bid    pending US duplex b/l LE r/o DVT and TTE Shortness of breath for 1 week associated with bilateral pitting edema and Right + homans sign  hypoxia on admission, now saturating at 100% on 4 Lpm  ProBNP 915, no D-dimer obtained  CXR with pulmonary vascular congestion    new-onset CHF vs PE?  s/p IV lasix 80 mg in ED  started on IV lasix 40 mg bid    - pending US duplex b/l LE r/o DVT   - Start empiric full dose anticoagulation with lovenox given + homans sign and shortness of breath  - CT angio of the chest to rule out PE  - Obtain TTE to rule out new-onset CHF  - continue IV diuresis, daily weights, strict I&Os, fluid restriction

## 2022-03-03 NOTE — ED ADULT NURSE NOTE - NSIMPLEMENTINTERV_GEN_ALL_ED
Implemented All Fall with Harm Risk Interventions:  [place] to call system. Call bell, personal items and telephone within reach. Instruct patient to call for assistance. Room bathroom lighting operational. Non-slip footwear when patient is off stretcher. Physically safe environment: no spills, clutter or unnecessary equipment. Stretcher in lowest position, wheels locked, appropriate side rails in place. Provide visual cue, wrist band, yellow gown, etc. Monitor gait and stability. Monitor for mental status changes and reorient to person, place, and time. Review medications for side effects contributing to fall risk. Reinforce activity limits and safety measures with patient and family. Provide visual clues: red socks.

## 2022-03-03 NOTE — H&P ADULT - PROBLEM SELECTOR PLAN 3
- h/o HTN, on unknown dose of lisinopril and amlodipine  - hold home medications for now  - primary team to follow up on home dose - as above

## 2022-03-03 NOTE — H&P ADULT - ATTENDING COMMENTS
Pt seen at bedside  Case discussed with MAR.  81 year old woman with PMH of DM2, HTN, ischemic CVA here with some days of progressive SOB and B/L LE swelling. No other findings on ROS.    Vital Signs Last 24 Hrs  T(C): 36.3 (03 Mar 2022 01:09), Max: 36.4 (02 Mar 2022 21:03)  T(F): 97.3 (03 Mar 2022 01:09), Max: 97.6 (02 Mar 2022 21:03)  HR: 55 (03 Mar 2022 01:09) (55 - 55)  BP: 149/64 (03 Mar 2022 01:09) (149/64 - 149/74)  RR: 18 (03 Mar 2022 01:09) (18 - 18)  SpO2: 100% (03 Mar 2022 01:09) (94% - 100%)    Labs                         7.8    8.48  )-----------( 393      ( 03 Mar 2022 00:44 )             25.2     03-03    131<L>  |  102  |  25<H>  ----------------------------<  99  5.2   |  23  |  0.88    Ca    8.7      03 Mar 2022 00:44  TPro  6.9  /  Alb  3.3<L>  /  TBili  0.5  /  DBili  x   /  AST  16  /  ALT  15  /  AlkPhos  104  03-03    Pro BNP - 915    CXR B/L pleural effusion and widespread pulmonary vascular congestion.    Impression   Pt with no prior documented hx of CHF here with symptoms and signs of CHF exacerbation. Concern for high output cardiac failure also from severe anemia with hgb of 7.8 with unknown baseline.     - Acute CHF exacerbation   - Anemia- acute vs chronic   - DM  - HTN     Plan  - Admit to Medicine   - IV diuresis with Lasix 40mg q 12 hourly  - Check daily weights, I/O closely, fluid intake restriction, CHF diet  - ECHO, A1c, lipid   - Serial H/H - check FOBT, reticulocyte count; normal total bilirubin; less likely hemolytic  - Type and screen   - Cardiology consult - Dr Brenner.  - Resume home meds Pt seen at bedside  Case discussed with MAR.  81 year old woman with PMH of DM2, HTN, ischemic CVA here with some days of progressive SOB and B/L LE swelling. No other findings on ROS.    Vital Signs Last 24 Hrs  T(C): 36.3 (03 Mar 2022 01:09), Max: 36.4 (02 Mar 2022 21:03)  T(F): 97.3 (03 Mar 2022 01:09), Max: 97.6 (02 Mar 2022 21:03)  HR: 55 (03 Mar 2022 01:09) (55 - 55)  BP: 149/64 (03 Mar 2022 01:09) (149/64 - 149/74)  RR: 18 (03 Mar 2022 01:09) (18 - 18)  SpO2: 100% (03 Mar 2022 01:09) (94% - 100%)    Labs                         7.8    8.48  )-----------( 393      ( 03 Mar 2022 00:44 )             25.2     03-03    131<L>  |  102  |  25<H>  ----------------------------<  99  5.2   |  23  |  0.88    Ca    8.7      03 Mar 2022 00:44  TPro  6.9  /  Alb  3.3<L>  /  TBili  0.5  /  DBili  x   /  AST  16  /  ALT  15  /  AlkPhos  104  03-03    Pro BNP - 915    CXR B/L pleural effusion and widespread pulmonary vascular congestion.    Impression   Pt with no prior documented hx of CHF here with symptoms and signs of CHF exacerbation. Concern for high output cardiac failure also from severe anemia with hgb of 7.8 with unknown baseline.     - Acute CHF exacerbation   - Acute respiratory failure with hypoxia  - Anemia- acute vs chronic vs acute on chronic  - DM  - HTN     Plan  - Admit to Medicine   - IV diuresis with Lasix 40mg q 12 hourly  - Check daily weights, I/O closely, fluid intake restriction, CHF diet  - ECHO, A1c, lipid   - B/L DVT studies  - Serial H/H - check FOBT, reticulocyte count; normal total bilirubin; less likely hemolytic  - Type and screen   - Cardiology consult - Dr Brenner.  - Resume home meds

## 2022-03-04 DIAGNOSIS — M79.2 NEURALGIA AND NEURITIS, UNSPECIFIED: ICD-10-CM

## 2022-03-04 LAB
ANION GAP SERPL CALC-SCNC: 7 MMOL/L — SIGNIFICANT CHANGE UP (ref 5–17)
BUN SERPL-MCNC: 25 MG/DL — HIGH (ref 7–18)
CALCIUM SERPL-MCNC: 8.4 MG/DL — SIGNIFICANT CHANGE UP (ref 8.4–10.5)
CHLORIDE SERPL-SCNC: 101 MMOL/L — SIGNIFICANT CHANGE UP (ref 96–108)
CO2 SERPL-SCNC: 26 MMOL/L — SIGNIFICANT CHANGE UP (ref 22–31)
CREAT SERPL-MCNC: 0.91 MG/DL — SIGNIFICANT CHANGE UP (ref 0.5–1.3)
EGFR: 60 ML/MIN/1.73M2 — SIGNIFICANT CHANGE UP
GLUCOSE BLDC GLUCOMTR-MCNC: 155 MG/DL — HIGH (ref 70–99)
GLUCOSE SERPL-MCNC: 80 MG/DL — SIGNIFICANT CHANGE UP (ref 70–99)
HCT VFR BLD CALC: 23.5 % — LOW (ref 34.5–45)
HGB BLD-MCNC: 7.7 G/DL — LOW (ref 11.5–15.5)
MCHC RBC-ENTMCNC: 29.4 PG — SIGNIFICANT CHANGE UP (ref 27–34)
MCHC RBC-ENTMCNC: 32.8 GM/DL — SIGNIFICANT CHANGE UP (ref 32–36)
MCV RBC AUTO: 89.7 FL — SIGNIFICANT CHANGE UP (ref 80–100)
NRBC # BLD: 0 /100 WBCS — SIGNIFICANT CHANGE UP (ref 0–0)
PLATELET # BLD AUTO: 401 K/UL — HIGH (ref 150–400)
POTASSIUM SERPL-MCNC: 4.7 MMOL/L — SIGNIFICANT CHANGE UP (ref 3.5–5.3)
POTASSIUM SERPL-SCNC: 4.7 MMOL/L — SIGNIFICANT CHANGE UP (ref 3.5–5.3)
RBC # BLD: 2.62 M/UL — LOW (ref 3.8–5.2)
RBC # FLD: 14 % — SIGNIFICANT CHANGE UP (ref 10.3–14.5)
SODIUM SERPL-SCNC: 134 MMOL/L — LOW (ref 135–145)
WBC # BLD: 9.49 K/UL — SIGNIFICANT CHANGE UP (ref 3.8–10.5)
WBC # FLD AUTO: 9.49 K/UL — SIGNIFICANT CHANGE UP (ref 3.8–10.5)

## 2022-03-04 PROCEDURE — 99233 SBSQ HOSP IP/OBS HIGH 50: CPT | Mod: GC

## 2022-03-04 RX ORDER — ACETAMINOPHEN 500 MG
650 TABLET ORAL EVERY 6 HOURS
Refills: 0 | Status: DISCONTINUED | OUTPATIENT
Start: 2022-03-04 | End: 2022-03-09

## 2022-03-04 RX ORDER — ACETAMINOPHEN 500 MG
1000 TABLET ORAL ONCE
Refills: 0 | Status: COMPLETED | OUTPATIENT
Start: 2022-03-04 | End: 2022-03-04

## 2022-03-04 RX ORDER — GABAPENTIN 400 MG/1
300 CAPSULE ORAL
Refills: 0 | Status: DISCONTINUED | OUTPATIENT
Start: 2022-03-04 | End: 2022-03-09

## 2022-03-04 RX ORDER — ACETAMINOPHEN 500 MG
650 TABLET ORAL EVERY 6 HOURS
Refills: 0 | Status: DISCONTINUED | OUTPATIENT
Start: 2022-03-04 | End: 2022-03-04

## 2022-03-04 RX ADMIN — Medication 1000 MILLIGRAM(S): at 15:00

## 2022-03-04 RX ADMIN — GABAPENTIN 300 MILLIGRAM(S): 400 CAPSULE ORAL at 17:33

## 2022-03-04 RX ADMIN — SODIUM CHLORIDE 3 MILLILITER(S): 9 INJECTION INTRAMUSCULAR; INTRAVENOUS; SUBCUTANEOUS at 06:29

## 2022-03-04 RX ADMIN — Medication 650 MILLIGRAM(S): at 11:30

## 2022-03-04 RX ADMIN — ENOXAPARIN SODIUM 40 MILLIGRAM(S): 100 INJECTION SUBCUTANEOUS at 23:32

## 2022-03-04 RX ADMIN — Medication 650 MILLIGRAM(S): at 18:30

## 2022-03-04 RX ADMIN — Medication 40 MILLIGRAM(S): at 06:28

## 2022-03-04 RX ADMIN — CLOPIDOGREL BISULFATE 75 MILLIGRAM(S): 75 TABLET, FILM COATED ORAL at 12:31

## 2022-03-04 RX ADMIN — Medication 40 MILLIGRAM(S): at 17:32

## 2022-03-04 RX ADMIN — Medication 400 MILLIGRAM(S): at 14:23

## 2022-03-04 RX ADMIN — SODIUM CHLORIDE 3 MILLILITER(S): 9 INJECTION INTRAMUSCULAR; INTRAVENOUS; SUBCUTANEOUS at 14:26

## 2022-03-04 RX ADMIN — SODIUM CHLORIDE 3 MILLILITER(S): 9 INJECTION INTRAMUSCULAR; INTRAVENOUS; SUBCUTANEOUS at 22:14

## 2022-03-04 RX ADMIN — Medication 650 MILLIGRAM(S): at 10:24

## 2022-03-04 RX ADMIN — Medication 650 MILLIGRAM(S): at 17:32

## 2022-03-04 RX ADMIN — GABAPENTIN 300 MILLIGRAM(S): 400 CAPSULE ORAL at 10:25

## 2022-03-04 RX ADMIN — Medication 650 MILLIGRAM(S): at 23:30

## 2022-03-04 NOTE — PROGRESS NOTE ADULT - SUBJECTIVE AND OBJECTIVE BOX
PGY-1 Progress Note discussed with attending    PAGER #: [[phone]] TILL 5:00 PM  PLEASE CONTACT ON CALL TEAM:  - On Call Team (Please refer to Joe) FROM 5:00 PM - 8:30PM  - Nightfloat Team FROM 8:30 -7:30 AM    INTERVAL HPI/ OVERNIGHT EVENTS: No acute events overnight.       REVIEW OF SYSTEMS:  CONSTITUTIONAL: No fever, weight loss, or fatigue  RESPIRATORY: No cough, wheezing, chills or hemoptysis; No shortness of breath  CARDIOVASCULAR: No chest pain, palpitations, dizziness, or leg swelling  GASTROINTESTINAL: No abdominal pain. No nausea, vomiting, or hematemesis; No diarrhea or constipation. No melena or hematochezia.  GENITOURINARY: No dysuria or hematuria, urinary frequency  NEUROLOGICAL: No headaches, memory loss, loss of strength, numbness, or tremors  SKIN: No itching, burning, rashes, or lesions     MEDICATIONS  (STANDING):  clopidogrel Tablet 75 milliGRAM(s) Oral daily  enoxaparin Injectable 40 milliGRAM(s) SubCutaneous every 24 hours  furosemide   Injectable 40 milliGRAM(s) IV Push two times a day  gabapentin 300 milliGRAM(s) Oral two times a day  sodium chloride 0.9% lock flush 3 milliLiter(s) IV Push every 8 hours    MEDICATIONS  (PRN):  acetaminophen     Tablet .. 650 milliGRAM(s) Oral every 6 hours PRN Mild Pain (1 - 3)      Vital Signs Last 24 Hrs  T(C): 36.5 (04 Mar 2022 07:53), Max: 36.8 (04 Mar 2022 00:13)  T(F): 97.7 (04 Mar 2022 07:53), Max: 98.2 (04 Mar 2022 00:13)  HR: 77 (04 Mar 2022 07:53) (54 - 77)  BP: 139/51 (04 Mar 2022 07:53) (104/52 - 144/66)  BP(mean): --  RR: 18 (04 Mar 2022 07:53) (16 - 18)  SpO2: 100% (04 Mar 2022 07:53) (99% - 100%)    PHYSICAL EXAMINATION:  GENERAL: NAD, AAOx3  HEAD: AT/NC  EYES: conjunctiva and sclera clear  NECK: supple, No JVD noted, Normal thyroid  CHEST/LUNG: CTABL; no rales, rhonchi, wheezing, or rubs  HEART: regular rate and rhythm; no murmurs, rubs, or gallops  ABDOMEN: soft, nontender, nondistended; Bowel sounds present  EXTREMITIES: B/L LE edema and B/L LE tenderness   SKIN: warm dry                          7.7    9.49  )-----------( 401      ( 04 Mar 2022 07:24 )             23.5     03-04    134<L>  |  101  |  25<H>  ----------------------------<  80  4.7   |  26  |  0.91    Ca    8.4      04 Mar 2022 07:24  Phos  4.7     03-03  Mg     2.0     03-03    TPro  6.7  /  Alb  3.0<L>  /  TBili  0.6  /  DBili  x   /  AST  12  /  ALT  14  /  AlkPhos  98  03-03    LIVER FUNCTIONS - ( 03 Mar 2022 11:00 )  Alb: 3.0 g/dL / Pro: 6.7 g/dL / ALK PHOS: 98 U/L / ALT: 14 U/L DA / AST: 12 U/L / GGT: x               PT/INR - ( 03 Mar 2022 10:59 )   PT: 12.3 sec;   INR: 1.03 ratio         PTT - ( 03 Mar 2022 10:59 )  PTT:26.3 sec  COVID-19 PCR: NotDetec (03 Mar 2022 00:44)      CAPILLARY BLOOD GLUCOSE          RADIOLOGY & ADDITIONAL TESTS:

## 2022-03-04 NOTE — PROGRESS NOTE ADULT - PROBLEM SELECTOR PLAN 1
- p/w progressive SOB x1wk  - ddx: PE vs new-onset CHF vs symptomatic anemia  - PE: decreased breath sounds b/l, pitting edema b/l (R>L), positive rashard sign  - hgb 7.8 (unknown baseline)  -   - CXR pulmonary congestion   - s/p furosemide 80 in ED  - c/w oxygen supp, wean off as tolerated   - telemonitoring  - dimer 483, LE doppler neg   - Cardio, Dr. Tovar, consulted  - hold empiric AC given anemia  -now resolved. c/w 40mg IV lasix BID

## 2022-03-04 NOTE — PROGRESS NOTE ADULT - PROBLEM SELECTOR PLAN 5
- h/o HTN, on unknown dose of lisinopril and amlodipine  - hold home medications for now  - primary team to follow up on home dose

## 2022-03-04 NOTE — PROGRESS NOTE ADULT - SUBJECTIVE AND OBJECTIVE BOX
DATE OF SERVICE:  3/4/2022  Patient was seen and examined on 3/4/2022    .Interim events noted.Consultant notes ,Labs,Telemetry reviewed by me       HOSPITAL COURSE: HPI:  Patient is a 89yoF, AAOx3 and ambulates w/ walker, w/ PMH of CVA, HTN, DM, p/w SOB for a week. She reports progressive SOB. She states she was unable to lay flat due to difficulty of breathing, and she is unable to walk due to SOB. She also endorses recent swelling of her leg bilaterally (R>L). She states no trigger events, which includes no recent sickness. She denies fever, chills, n/v, chest pain, abdominal pain, urinary or bowel movement changes. (03 Mar 2022 03:38)      INTERIM EVENTS:Patient seen at bedside ,interim events noted.      PMH -reviewed admission note, no change since admission  HEART FAILURE: Acute[ ]Chronic[ ] Systolic[ ] Diastolic[ ] Combined Systolic and Diastolic[ ]  CAD[ ] CABG[ ] PCI[ ]  DEVICES[ ] PPM[ ] ICD[ ] ILR[ ]  ATRIAL FIBRILLATION[ ] Paroxysmal[ ] Permanent[ ]  LYNN[ ] CKD1[ ] CKD2[ ] CKD3[ ] CKD4[ ] ESRD[ ]  COPD[ ] HTN[ ]   DM[ ] Type1[ ] Type 2[ ]   CVA[ ] Paresis[ ]    AMBULATION: Assisted[ ] Cane/walker[ ] Independent[ ]    MEDICATIONS  (STANDING):  acetaminophen     Tablet .. 650 milliGRAM(s) Oral every 6 hours  clopidogrel Tablet 75 milliGRAM(s) Oral daily  enoxaparin Injectable 40 milliGRAM(s) SubCutaneous every 24 hours  furosemide   Injectable 40 milliGRAM(s) IV Push two times a day  gabapentin 300 milliGRAM(s) Oral two times a day  sodium chloride 0.9% lock flush 3 milliLiter(s) IV Push every 8 hours    MEDICATIONS  (PRN):            REVIEW OF SYSTEMS:  Constitutional: [ ] fever, [ ]weight loss,  [ ]fatigue  Eyes: [ ] visual changes  Respiratory: [ ]shortness of breath;  [ ] cough, [ ]wheezing, [ ]chills, [ ]hemoptysis  Cardiovascular: [ ] chest pain, [ ]palpitations, [ ]dizziness,  [ ]leg swelling[ ]orthopnea[ ]PND  Gastrointestinal: [ ] abdominal pain, [ ]nausea, [ ]vomiting,  [ ]diarrhea [ ]Constipation [ ]Melena  Genitourinary: [ ] dysuria, [ ] hematuria [ ]Fernando  Neurologic: [ ] headaches [ ] tremors[ ]weakness [ ]Paralysis Right[ ] Left[ ]  Skin: [ ] itching, [ ]burning, [ ] rashes  Endocrine: [ ] heat or cold intolerance  Musculoskeletal: [ ] joint pain or swelling; [ ] muscle, back, or extremity pain  Psychiatric: [ ] depression, [ ]anxiety, [ ]mood swings, or [ ]difficulty sleeping  Hematologic: [ ] easy bruising, [ ] bleeding gums    [ ] All remaining systems negative except as per above.   [ ]Unable to obtain.  [x] No change in ROS since admission      Vital Signs Last 24 Hrs  T(C): 36.6 (04 Mar 2022 16:48), Max: 36.8 (04 Mar 2022 00:13)  T(F): 97.8 (04 Mar 2022 16:48), Max: 98.2 (04 Mar 2022 00:13)  HR: 65 (04 Mar 2022 16:48) (58 - 77)  BP: 125/46 (04 Mar 2022 16:48) (100/40 - 144/66)  BP(mean): --  RR: 18 (04 Mar 2022 16:48) (16 - 18)  SpO2: 96% (04 Mar 2022 16:48) (96% - 100%)  I&O's Summary    03 Mar 2022 07:01  -  04 Mar 2022 07:00  --------------------------------------------------------  IN: 0 mL / OUT: 1900 mL / NET: -1900 mL    04 Mar 2022 07:01  -  04 Mar 2022 18:07  --------------------------------------------------------  IN: 1200 mL / OUT: 500 mL / NET: 700 mL        PHYSICAL EXAM:  General: No acute distress BMI-  HEENT: EOMI, PERRL  Neck: Supple, [ ] JVD  Lungs: Equal air entry bilaterally; [ ] rales [ ] wheezing [ ] rhonchi  Heart: Regular rate and rhythm; [x ] murmur   2/6 [ x] systolic [ ] diastolic [ ] radiation[ ] rubs [ ]  gallops  Abdomen: Nontender, bowel sounds present  Extremities: No clubbing, cyanosis, [ ] edema [ ]Pulses  equal and intact  Nervous system:  Alert & Oriented X3, no focal deficits  Psychiatric: Normal affect  Skin: No rashes or lesions    LABS:  03-04    134<L>  |  101  |  25<H>  ----------------------------<  80  4.7   |  26  |  0.91    Ca    8.4      04 Mar 2022 07:24  Phos  4.7     03-03  Mg     2.0     03-03    TPro  6.7  /  Alb  3.0<L>  /  TBili  0.6  /  DBili  x   /  AST  12  /  ALT  14  /  AlkPhos  98  03-03    Creatinine Trend: 0.91<--, 0.86<--, 0.88<--                        7.7    9.49  )-----------( 401      ( 04 Mar 2022 07:24 )             23.5     PT/INR - ( 03 Mar 2022 10:59 )   PT: 12.3 sec;   INR: 1.03 ratio         PTT - ( 03 Mar 2022 10:59 )  PTT:26.3 sec    Serum Pro-Brain Natriuretic Peptide: 915 pg/mL (03-03-22 @ 00:44)

## 2022-03-04 NOTE — PROGRESS NOTE ADULT - ATTENDING COMMENTS
Patient was seen and examined at bedside   Reports SOB has improved, saturating >94% on RA, no SOB on minimum exertion   Complains of BL knee pain (patient reports chronic arthritis)    Vitals noted     P/E:  NAD, pleasant lady   AAOx3, no focal deficit   S1S2 WNL, no MRG   BL scattered crepitations in lungs   Abd soft, non tender, BS present   BL LE no edema   BL Knee tenderness, no erythema or swelling     labs noted     A/P:  acute hypoxic respiratory failure resolved   Possible new onset Heart failure   Chronic anemia   BL knee pain due to OA  HTN  H/o TIA   DM    Plan:   Off O2, monitor respiratory status  Cont diuresis with lasix 40mg BID  Repeat CXR tomorrow   STEVEN veliz, monitor I/O with prima fit   daily weight   Salt and water restriction  TTE   Cont tele   Cont plavix  Hb stable   CT abd and pelvis for anemia   Follow anemia w/u  Tylenol for knee pain, avoid nsaid  Cont home dose Gabapentin  ISS  OOB to chair   Full code  Plan of care was discussed with patient; all questions and concerns were addressed and care was aligned with patient's wishes  Discussed with HS Patient was seen and examined at bedside   Reports SOB has improved, saturating >94% on RA, no SOB on minimum exertion   Complains of BL knee pain (patient reports chronic arthritis)    Vitals noted     P/E:  NAD, pleasant lady   AAOx3, no focal deficit   S1S2 WNL, no MRG   BL scattered crepitations in lungs   Abd soft, non tender, BS present   BL LE no edema   BL Knee tenderness, no erythema or swelling     labs noted     A/P:  acute hypoxic respiratory failure resolved   Possible new onset Heart failure   Chronic anemia   BL knee pain due to OA  HTN  H/o TIA   DM    Plan:   Off O2, monitor respiratory status  Cont diuresis with lasix 40mg BID  Repeat CXR tomorrow   STEVEN veliz, monitor I/O with prima fit   daily weight   Salt and water restriction  TTE   Cont tele   Cont plavix  Hb stable   CT abd and pelvis for anemia   Follow anemia w/u  Tylenol for knee pain, avoid nsaid  Cont home dose Gabapentin  ISS  LE doppler neg  OOB to chair   Full code  Plan of care was discussed with patient and patient's caregiver Son; all questions and concerns were addressed and care was aligned with their wishes  Discussed with residents

## 2022-03-05 DIAGNOSIS — M19.90 UNSPECIFIED OSTEOARTHRITIS, UNSPECIFIED SITE: ICD-10-CM

## 2022-03-05 DIAGNOSIS — I50.31 ACUTE DIASTOLIC (CONGESTIVE) HEART FAILURE: ICD-10-CM

## 2022-03-05 DIAGNOSIS — D50.9 IRON DEFICIENCY ANEMIA, UNSPECIFIED: ICD-10-CM

## 2022-03-05 LAB
ALBUMIN SERPL ELPH-MCNC: 2.6 G/DL — LOW (ref 3.5–5)
ALP SERPL-CCNC: 91 U/L — SIGNIFICANT CHANGE UP (ref 40–120)
ALT FLD-CCNC: 14 U/L DA — SIGNIFICANT CHANGE UP (ref 10–60)
ANION GAP SERPL CALC-SCNC: 6 MMOL/L — SIGNIFICANT CHANGE UP (ref 5–17)
ANION GAP SERPL CALC-SCNC: 6 MMOL/L — SIGNIFICANT CHANGE UP (ref 5–17)
AST SERPL-CCNC: 26 U/L — SIGNIFICANT CHANGE UP (ref 10–40)
BILIRUB SERPL-MCNC: 0.6 MG/DL — SIGNIFICANT CHANGE UP (ref 0.2–1.2)
BUN SERPL-MCNC: 33 MG/DL — HIGH (ref 7–18)
BUN SERPL-MCNC: 33 MG/DL — HIGH (ref 7–18)
CALCIUM SERPL-MCNC: 8.3 MG/DL — LOW (ref 8.4–10.5)
CALCIUM SERPL-MCNC: 8.4 MG/DL — SIGNIFICANT CHANGE UP (ref 8.4–10.5)
CHLORIDE SERPL-SCNC: 97 MMOL/L — SIGNIFICANT CHANGE UP (ref 96–108)
CHLORIDE SERPL-SCNC: 99 MMOL/L — SIGNIFICANT CHANGE UP (ref 96–108)
CO2 SERPL-SCNC: 27 MMOL/L — SIGNIFICANT CHANGE UP (ref 22–31)
CO2 SERPL-SCNC: 27 MMOL/L — SIGNIFICANT CHANGE UP (ref 22–31)
CREAT SERPL-MCNC: 1.15 MG/DL — SIGNIFICANT CHANGE UP (ref 0.5–1.3)
CREAT SERPL-MCNC: 1.17 MG/DL — SIGNIFICANT CHANGE UP (ref 0.5–1.3)
EGFR: 45 ML/MIN/1.73M2 — LOW
EGFR: 46 ML/MIN/1.73M2 — LOW
GLUCOSE SERPL-MCNC: 121 MG/DL — HIGH (ref 70–99)
GLUCOSE SERPL-MCNC: 84 MG/DL — SIGNIFICANT CHANGE UP (ref 70–99)
HCT VFR BLD CALC: 23.1 % — LOW (ref 34.5–45)
HGB BLD-MCNC: 7.6 G/DL — LOW (ref 11.5–15.5)
MAGNESIUM SERPL-MCNC: 2.1 MG/DL — SIGNIFICANT CHANGE UP (ref 1.6–2.6)
MCHC RBC-ENTMCNC: 29.3 PG — SIGNIFICANT CHANGE UP (ref 27–34)
MCHC RBC-ENTMCNC: 32.9 GM/DL — SIGNIFICANT CHANGE UP (ref 32–36)
MCV RBC AUTO: 89.2 FL — SIGNIFICANT CHANGE UP (ref 80–100)
NRBC # BLD: 0 /100 WBCS — SIGNIFICANT CHANGE UP (ref 0–0)
PHOSPHATE SERPL-MCNC: 4.9 MG/DL — HIGH (ref 2.5–4.5)
PLATELET # BLD AUTO: 378 K/UL — SIGNIFICANT CHANGE UP (ref 150–400)
POTASSIUM SERPL-MCNC: 5 MMOL/L — SIGNIFICANT CHANGE UP (ref 3.5–5.3)
POTASSIUM SERPL-MCNC: 5.4 MMOL/L — HIGH (ref 3.5–5.3)
POTASSIUM SERPL-SCNC: 5 MMOL/L — SIGNIFICANT CHANGE UP (ref 3.5–5.3)
POTASSIUM SERPL-SCNC: 5.4 MMOL/L — HIGH (ref 3.5–5.3)
PROT SERPL-MCNC: 6.5 G/DL — SIGNIFICANT CHANGE UP (ref 6–8.3)
RBC # BLD: 2.59 M/UL — LOW (ref 3.8–5.2)
RBC # FLD: 14 % — SIGNIFICANT CHANGE UP (ref 10.3–14.5)
SODIUM SERPL-SCNC: 130 MMOL/L — LOW (ref 135–145)
SODIUM SERPL-SCNC: 132 MMOL/L — LOW (ref 135–145)
WBC # BLD: 8.94 K/UL — SIGNIFICANT CHANGE UP (ref 3.8–10.5)
WBC # FLD AUTO: 8.94 K/UL — SIGNIFICANT CHANGE UP (ref 3.8–10.5)

## 2022-03-05 PROCEDURE — 74176 CT ABD & PELVIS W/O CONTRAST: CPT | Mod: 26

## 2022-03-05 PROCEDURE — 99233 SBSQ HOSP IP/OBS HIGH 50: CPT | Mod: GC

## 2022-03-05 RX ORDER — FUROSEMIDE 40 MG
40 TABLET ORAL DAILY
Refills: 0 | Status: DISCONTINUED | OUTPATIENT
Start: 2022-03-05 | End: 2022-03-07

## 2022-03-05 RX ORDER — SODIUM ZIRCONIUM CYCLOSILICATE 10 G/10G
5 POWDER, FOR SUSPENSION ORAL ONCE
Refills: 0 | Status: COMPLETED | OUTPATIENT
Start: 2022-03-05 | End: 2022-03-05

## 2022-03-05 RX ORDER — FERROUS SULFATE 325(65) MG
325 TABLET ORAL DAILY
Refills: 0 | Status: DISCONTINUED | OUTPATIENT
Start: 2022-03-05 | End: 2022-03-09

## 2022-03-05 RX ADMIN — SODIUM CHLORIDE 3 MILLILITER(S): 9 INJECTION INTRAMUSCULAR; INTRAVENOUS; SUBCUTANEOUS at 13:21

## 2022-03-05 RX ADMIN — GABAPENTIN 300 MILLIGRAM(S): 400 CAPSULE ORAL at 18:11

## 2022-03-05 RX ADMIN — Medication 650 MILLIGRAM(S): at 06:41

## 2022-03-05 RX ADMIN — SODIUM CHLORIDE 3 MILLILITER(S): 9 INJECTION INTRAMUSCULAR; INTRAVENOUS; SUBCUTANEOUS at 05:29

## 2022-03-05 RX ADMIN — GABAPENTIN 300 MILLIGRAM(S): 400 CAPSULE ORAL at 06:41

## 2022-03-05 RX ADMIN — Medication 650 MILLIGRAM(S): at 08:19

## 2022-03-05 RX ADMIN — Medication 650 MILLIGRAM(S): at 12:51

## 2022-03-05 RX ADMIN — Medication 650 MILLIGRAM(S): at 12:13

## 2022-03-05 RX ADMIN — Medication 650 MILLIGRAM(S): at 00:20

## 2022-03-05 RX ADMIN — Medication 325 MILLIGRAM(S): at 18:11

## 2022-03-05 RX ADMIN — SODIUM ZIRCONIUM CYCLOSILICATE 5 GRAM(S): 10 POWDER, FOR SUSPENSION ORAL at 10:19

## 2022-03-05 RX ADMIN — CLOPIDOGREL BISULFATE 75 MILLIGRAM(S): 75 TABLET, FILM COATED ORAL at 12:13

## 2022-03-05 NOTE — PROGRESS NOTE ADULT - PROBLEM SELECTOR PLAN 5
- h/o HTN, on unknown dose of lisinopril and amlodipine  - hold home medications for now  - primary team to follow up on home dose Tylenol PRN   Gabapentin 300mg BID

## 2022-03-05 NOTE — PROGRESS NOTE ADULT - PROBLEM SELECTOR PLAN 6
- h/o CVA, on plavix at home  -plavix restarted h/o HTN, on unknown dose of lisinopril and amlodipine  - hold home medications for now  - primary team to follow up on home dose

## 2022-03-05 NOTE — PROGRESS NOTE ADULT - PROBLEM SELECTOR PLAN 3
- as above Hgb 7.8 (unknown baseline)  held empiric AC given anemia, PE r/o   Iron low and ferritin low   started on ferrous sulfate   f/u CT abdomen/pelvis r/o occult malignancy   monitor cbc

## 2022-03-05 NOTE — PROGRESS NOTE ADULT - PROBLEM SELECTOR PLAN 7
- h/o DM, recently d/c on metfomin by PCP per pt  - c/w ISS  - FS qACHs  -A1C 6.3 - h/o CVA, on plavix at home  -plavix restarted

## 2022-03-05 NOTE — PROGRESS NOTE ADULT - PROBLEM SELECTOR PLAN 1
- p/w progressive SOB x1wk  - ddx: PE vs new-onset CHF vs symptomatic anemia  - PE: decreased breath sounds b/l, pitting edema b/l (R>L), positive rashard sign  - hgb 7.8 (unknown baseline)  -   - CXR pulmonary congestion   - s/p furosemide 80 in ED  - c/w oxygen supp, wean off as tolerated   - telemonitoring  - dimer 483, LE doppler neg   - hold empiric AC given anemia  -now resolved. c/w 40mg IV lasix BID

## 2022-03-05 NOTE — PROGRESS NOTE ADULT - PROBLEM SELECTOR PLAN 2
Plan as above h/o of OA  s/p tylenol IV x1, responded well pain improved  c/w tylenol PRN  f/u PT eval

## 2022-03-05 NOTE — PROGRESS NOTE ADULT - SUBJECTIVE AND OBJECTIVE BOX
PGY-1 Progress Note discussed with attending    PAGER #: [[phone]] TILL 5:00 PM  PLEASE CONTACT ON CALL TEAM:  - On Call Team (Please refer to Joe) FROM 5:00 PM - 8:30PM  - Nightfloat Team FROM 8:30 -7:30 AM    CHIEF COMPLAINT & BRIEF HOSPITAL COURSE:  Patient is a 89yoF, AAOx3 and ambulates w/ walker, w/ PMH of CVA, HTN, DM, p/w SOB for a week. She reports progressive SOB. She states she was unable to lay flat due to difficulty of breathing, and she is unable to walk due to SOB. She also endorses recent swelling of her leg bilaterally (R>L). She states no trigger events, which includes no recent sickness. She denies fever, chills, n/v, chest pain, abdominal pain, urinary or bowel movement changes. (03 Mar 2022 03:38)      INTERVAL HPI/OVERNIGHT EVENTS:         MEDICATIONS  (STANDING):  acetaminophen     Tablet .. 650 milliGRAM(s) Oral every 6 hours  clopidogrel Tablet 75 milliGRAM(s) Oral daily  enoxaparin Injectable 40 milliGRAM(s) SubCutaneous every 24 hours  furosemide   Injectable 40 milliGRAM(s) IV Push two times a day  gabapentin 300 milliGRAM(s) Oral two times a day  sodium chloride 0.9% lock flush 3 milliLiter(s) IV Push every 8 hours  sodium zirconium cyclosilicate 5 Gram(s) Oral once        REVIEW OF SYSTEMS:  CONSTITUTIONAL: No fever, weight loss, or fatigue  RESPIRATORY: No cough, wheezing, chills or hemoptysis; No shortness of breath  CARDIOVASCULAR: No chest pain, palpitations, dizziness, or leg swelling  GASTROINTESTINAL: No abdominal pain. No nausea, vomiting, or hematemesis; No diarrhea or constipation. No melena or hematochezia.  GENITOURINARY: No dysuria or hematuria, urinary frequency  NEUROLOGICAL: No headaches, memory loss, loss of strength, numbness, or tremors  SKIN: No itching, burning, rashes, or lesions     Vital Signs Last 24 Hrs  T(C): 36.6 (05 Mar 2022 08:00), Max: 37.6 (05 Mar 2022 00:28)  T(F): 97.8 (05 Mar 2022 08:00), Max: 99.7 (05 Mar 2022 00:28)  HR: 70 (05 Mar 2022 08:00) (60 - 90)  BP: 124/40 (05 Mar 2022 08:00) (100/40 - 125/46)  BP(mean): --  RR: 18 (05 Mar 2022 08:00) (18 - 18)  SpO2: 95% (05 Mar 2022 08:00) (94% - 98%)    PHYSICAL EXAMINATION:  GENERAL: NAD, well built  HEAD:  Atraumatic, Normocephalic  EYES:  conjunctiva and sclera clear  NECK: Supple, No JVD, Normal thyroid  CHEST/LUNG: Clear to auscultation. Clear to percussion bilaterally; No rales, rhonchi, wheezing, or rubs  HEART: Regular rate and rhythm; No murmurs, rubs, or gallops  ABDOMEN: Soft, Nontender, Nondistended; Bowel sounds present  NERVOUS SYSTEM:  Alert & Oriented X3,    EXTREMITIES:  2+ Peripheral Pulses, No clubbing, cyanosis, or edema  SKIN: warm dry                          7.6    8.94  )-----------( 378      ( 05 Mar 2022 06:34 )             23.1     03-05    132<L>  |  99  |  33<H>  ----------------------------<  84  5.4<H>   |  27  |  1.17    Ca    8.3<L>      05 Mar 2022 06:34  Phos  4.9     03-05  Mg     2.1     03-05    TPro  6.5  /  Alb  2.6<L>  /  TBili  0.6  /  DBili  x   /  AST  26  /  ALT  14  /  AlkPhos  91  03-05    LIVER FUNCTIONS - ( 05 Mar 2022 06:34 )  Alb: 2.6 g/dL / Pro: 6.5 g/dL / ALK PHOS: 91 U/L / ALT: 14 U/L DA / AST: 26 U/L / GGT: x               PT/INR - ( 03 Mar 2022 10:59 )   PT: 12.3 sec;   INR: 1.03 ratio    PTT - ( 03 Mar 2022 10:59 )  PTT:26.3 sec        CAPILLARY BLOOD GLUCOSE        RADIOLOGY & ADDITIONAL TESTS:                   PGY-1 Progress Note discussed with attending    PAGER #: [[phone]] TILL 5:00 PM  PLEASE CONTACT ON CALL TEAM:  - On Call Team (Please refer to Joe) FROM 5:00 PM - 8:30PM  - Nightfloat Team FROM 8:30 -7:30 AM    CHIEF COMPLAINT & BRIEF HOSPITAL COURSE:  Patient is a 89yoF, AAOx3 and ambulates w/ walker, w/ PMH of CVA, HTN, DM, p/w SOB for a week. She reports progressive SOB. She states she was unable to lay flat due to difficulty of breathing, and she is unable to walk due to SOB. She also endorses recent swelling of her leg bilaterally (R>L). She states no trigger events, which includes no recent sickness. She denies fever, chills, n/v, chest pain, abdominal pain, urinary or bowel movement changes. (03 Mar 2022 03:38)      INTERVAL HPI/OVERNIGHT EVENTS:   No overnight events reported. Endorses improved pain s/p Tylenol initiation. F/u PT eval.       MEDICATIONS  (STANDING):  acetaminophen     Tablet .. 650 milliGRAM(s) Oral every 6 hours  clopidogrel Tablet 75 milliGRAM(s) Oral daily  enoxaparin Injectable 40 milliGRAM(s) SubCutaneous every 24 hours  furosemide   Injectable 40 milliGRAM(s) IV Push two times a day  gabapentin 300 milliGRAM(s) Oral two times a day  sodium chloride 0.9% lock flush 3 milliLiter(s) IV Push every 8 hours  sodium zirconium cyclosilicate 5 Gram(s) Oral once        REVIEW OF SYSTEMS:  CONSTITUTIONAL: No fever, weight loss, or fatigue  RESPIRATORY: No cough, wheezing, chills or hemoptysis; No shortness of breath  CARDIOVASCULAR: No chest pain, palpitations, dizziness, or leg swelling  GASTROINTESTINAL: No abdominal pain. No nausea, vomiting, or hematemesis; No diarrhea or constipation. No melena or hematochezia.  GENITOURINARY: No dysuria or hematuria, urinary frequency  NEUROLOGICAL: No headaches, memory loss, loss of strength, numbness, or tremors  SKIN: No itching, burning, rashes, or lesions     Vital Signs Last 24 Hrs  T(C): 36.6 (05 Mar 2022 08:00), Max: 37.6 (05 Mar 2022 00:28)  T(F): 97.8 (05 Mar 2022 08:00), Max: 99.7 (05 Mar 2022 00:28)  HR: 70 (05 Mar 2022 08:00) (60 - 90)  BP: 124/40 (05 Mar 2022 08:00) (100/40 - 125/46)  BP(mean): --  RR: 18 (05 Mar 2022 08:00) (18 - 18)  SpO2: 95% (05 Mar 2022 08:00) (94% - 98%)    PHYSICAL EXAMINATION:  GENERAL: NAD, well built  HEAD:  Atraumatic, Normocephalic  EYES:  conjunctiva and sclera clear  NECK: Supple, No JVD, Normal thyroid  CHEST/LUNG: Clear to auscultation. Clear to percussion bilaterally; No rales, rhonchi, wheezing, or rubs  HEART: Regular rate and rhythm; No murmurs, rubs, or gallops  ABDOMEN: Soft, Nontender, Nondistended; Bowel sounds present  NERVOUS SYSTEM:  Alert & Oriented X3,    EXTREMITIES:  2+ Peripheral Pulses, No clubbing, cyanosis, or edema  SKIN: warm dry                          7.6    8.94  )-----------( 378      ( 05 Mar 2022 06:34 )             23.1     03-05    132<L>  |  99  |  33<H>  ----------------------------<  84  5.4<H>   |  27  |  1.17    Ca    8.3<L>      05 Mar 2022 06:34  Phos  4.9     03-05  Mg     2.1     03-05    TPro  6.5  /  Alb  2.6<L>  /  TBili  0.6  /  DBili  x   /  AST  26  /  ALT  14  /  AlkPhos  91  03-05    LIVER FUNCTIONS - ( 05 Mar 2022 06:34 )  Alb: 2.6 g/dL / Pro: 6.5 g/dL / ALK PHOS: 91 U/L / ALT: 14 U/L DA / AST: 26 U/L / GGT: x               PT/INR - ( 03 Mar 2022 10:59 )   PT: 12.3 sec;   INR: 1.03 ratio    PTT - ( 03 Mar 2022 10:59 )  PTT:26.3 sec        RADIOLOGY & ADDITIONAL TESTS:  ______________________________________________________________________________________  < from: Xray Chest 1 View-PORTABLE IMMEDIATE (Xray Chest 1 View-PORTABLE IMMEDIATE .) (03.02.22 @ 23:33) >  IMPRESSION:   Cardiomegaly.  LEFT perihilar/bibasilar mild diffuse airspace disease..  ______________________________________________________________________________________  < from: US Duplex Venous Lower Ext Complete, Bilateral (03.03.22 @ 12:07) >  IMPRESSION:  No evidence of deep venous thrombosis in either lower extremity.  ______________________________________________________________________________________  < from: Transthoracic Echocardiogram (03.03.22 @ 07:15) >  CONCLUSIONS:  1. Mild posterior mitral annular calcification. Mild mitral  regurgitation.  2. Calcified trileaflet aortic valve with normal opening.  No aortic stenosis. Trace aortic regurgitation.  3. Normal aortic root.  4. Moderately dilated left atrium.  5. Normal left ventricular internal dimensions and wall  thicknesses.  6. Normal Left Ventricular Systolic Function,  (EF = 58% by  biplane).  7. Grade II diastolic dysfunction (moderate).  8. Normal right ventricular size and function.  9. RV systolic pressure is moderately increased at  48 mm  Hg.  10. Normal tricuspid valve. Mild tricuspid regurgitation.  11. No pericardial effusion.  12. Left pleural effusion.

## 2022-03-05 NOTE — PROGRESS NOTE ADULT - PROBLEM SELECTOR PLAN 1
- p/w progressive SOB x1wk  - ddx: PE vs new-onset CHF vs symptomatic anemia  - PE: decreased breath sounds b/l, pitting edema b/l (R>L), positive rashard sign  - hgb 7.8 (unknown baseline)  -   - CXR pulmonary congestion   - s/p furosemide 80 in ED  - c/w oxygen supp, wean off as tolerated   - telemonitoring  - dimer 483, LE doppler neg   - Cardio, Dr. Tovar, consulted  - hold empiric AC given anemia  -now resolved. c/w 40mg IV lasix BID p/w progressive SOB x1wk  ddx: PE vs new-onset CHF vs symptomatic anemia  physical exam: decreased breath sounds b/l, pitting edema b/l (R>L), positive rashard sign    CXR pulmonary congestion   s/p furosemide 80 in ED  off O2, saturating well on RA   c/w 40mg IV lasix BID, transitioned to Lasix IV QD for up trending SCr  telemonitoring  dimer 483, LE doppler neg   Cardio, Dr. Tovar, consulted

## 2022-03-05 NOTE — PROGRESS NOTE ADULT - SUBJECTIVE AND OBJECTIVE BOX
DATE OF SERVICE:  3/5/2022  Patient was seen and examined on  3/5/2022   .Interim events noted.Consultant notes ,Labs,Telemetry reviewed by me       HOSPITAL COURSE: HPI:  Patient is a 89yoF, AAOx3 and ambulates w/ walker, w/ PMH of CVA, HTN, DM, p/w SOB for a week. She reports progressive SOB. She states she was unable to lay flat due to difficulty of breathing, and she is unable to walk due to SOB. She also endorses recent swelling of her leg bilaterally (R>L). She states no trigger events, which includes no recent sickness. She denies fever, chills, n/v, chest pain, abdominal pain, urinary or bowel movement changes. (03 Mar 2022 03:38)      INTERIM EVENTS:Patient seen at bedside ,interim events noted.      PMH -reviewed admission note, no change since admission  HEART FAILURE: Acute[ ]Chronic[ ] Systolic[ ] Diastolic[ ] Combined Systolic and Diastolic[ ]  CAD[ ] CABG[ ] PCI[ ]  DEVICES[ ] PPM[ ] ICD[ ] ILR[ ]  ATRIAL FIBRILLATION[ ] Paroxysmal[ ] Permanent[ ]  LYNN[ ] CKD1[ ] CKD2[ ] CKD3[ ] CKD4[ ] ESRD[ ]  COPD[ ] HTN[ ]   DM[ ] Type1[ ] Type 2[ ]   CVA[ ] Paresis[ ]    AMBULATION: Assisted[ ] Cane/walker[ ] Independent[ ]    MEDICATIONS  (STANDING):  acetaminophen     Tablet .. 650 milliGRAM(s) Oral every 6 hours  clopidogrel Tablet 75 milliGRAM(s) Oral daily  enoxaparin Injectable 40 milliGRAM(s) SubCutaneous every 24 hours  gabapentin 300 milliGRAM(s) Oral two times a day  sodium chloride 0.9% lock flush 3 milliLiter(s) IV Push every 8 hours    MEDICATIONS  (PRN):            REVIEW OF SYSTEMS:  Constitutional: [ ] fever, [ ]weight loss,  [ ]fatigue  Eyes: [ ] visual changes  Respiratory: [ ]shortness of breath;  [ ] cough, [ ]wheezing, [ ]chills, [ ]hemoptysis  Cardiovascular: [ ] chest pain, [ ]palpitations, [ ]dizziness,  [ ]leg swelling[ ]orthopnea[ ]PND  Gastrointestinal: [ ] abdominal pain, [ ]nausea, [ ]vomiting,  [ ]diarrhea [ ]Constipation [ ]Melena  Genitourinary: [ ] dysuria, [ ] hematuria [ ]Fernando  Neurologic: [ ] headaches [ ] tremors[ ]weakness [ ]Paralysis Right[ ] Left[ ]  Skin: [ ] itching, [ ]burning, [ ] rashes  Endocrine: [ ] heat or cold intolerance  Musculoskeletal: [ ] joint pain or swelling; [ ] muscle, back, or extremity pain  Psychiatric: [ ] depression, [ ]anxiety, [ ]mood swings, or [ ]difficulty sleeping  Hematologic: [ ] easy bruising, [ ] bleeding gums    [ ] All remaining systems negative except as per above.   [ ]Unable to obtain.  [x] No change in ROS since admission      Vital Signs Last 24 Hrs  T(C): 36.7 (05 Mar 2022 11:17), Max: 37.6 (05 Mar 2022 00:28)  T(F): 98 (05 Mar 2022 11:17), Max: 99.7 (05 Mar 2022 00:28)  HR: 55 (05 Mar 2022 11:17) (55 - 90)  BP: 107/43 (05 Mar 2022 11:17) (100/40 - 125/46)  BP(mean): --  RR: 18 (05 Mar 2022 11:17) (18 - 18)  SpO2: 97% (05 Mar 2022 11:17) (94% - 98%)  I&O's Summary    04 Mar 2022 07:01  -  05 Mar 2022 07:00  --------------------------------------------------------  IN: 1200 mL / OUT: 500 mL / NET: 700 mL        PHYSICAL EXAM:  General: No acute distress BMI-  HEENT: EOMI, PERRL  Neck: Supple, [ ] JVD  Lungs: Equal air entry bilaterally; [ ] rales [ ] wheezing [ ] rhonchi  Heart: Regular rate and rhythm; [x ] murmur   2/6 [ x] systolic [ ] diastolic [ ] radiation[ ] rubs [ ]  gallops  Abdomen: Nontender, bowel sounds present  Extremities: No clubbing, cyanosis, [ ] edema [ ]Pulses  equal and intact  Nervous system:  Alert & Oriented X3, no focal deficits  Psychiatric: Normal affect  Skin: No rashes or lesions    LABS:  03-05    132<L>  |  99  |  33<H>  ----------------------------<  84  5.4<H>   |  27  |  1.17    Ca    8.3<L>      05 Mar 2022 06:34  Phos  4.9     03-05  Mg     2.1     03-05    TPro  6.5  /  Alb  2.6<L>  /  TBili  0.6  /  DBili  x   /  AST  26  /  ALT  14  /  AlkPhos  91  03-05    Creatinine Trend: 1.17<--, 0.91<--, 0.86<--, 0.88<--                        7.6    8.94  )-----------( 378      ( 05 Mar 2022 06:34 )             23.1

## 2022-03-05 NOTE — PROGRESS NOTE ADULT - ATTENDING COMMENTS
Patient was seen and examined with daughter in law at bedside   Pain has improved, denies any SOB, orthopnea    Vitals noted     P/E:  NAD, pleasant lady   AAOx3, no focal deficit   S1S2 WNL, no MRG   BL scattered crepitations in lungs   Abd soft, non tender, BS present   BL LE no edema   BL Knee tenderness- improved, no erythema or swelling     labs noted     A/P:  acute hypoxic respiratory failure resolved   Acute diastolic heart failure  Chronic anemia   BL knee pain due to OA  HTN  H/o TIA   DM    Plan:   Off O2, monitor respiratory status  Cont diuresis with lasix 40mg daily  daily weight   Salt and water restriction  TTE noted  Cont tele   Cont Plavix  Hb stable   CT abd and pelvis for anemia   Follow anemia w/u  Tylenol for knee pain, avoid nsaid  Cont home dose Gabapentin  ISS  LE doppler neg  OOB to chair   Full code  Plan of care was discussed with patient's family at bedside; all questions and concerns were addressed and care was aligned with their wishes

## 2022-03-05 NOTE — PROGRESS NOTE ADULT - PROBLEM SELECTOR PLAN 4
Tylenol PRN   Gabapentin 300mg BID RESOLVED, s/p Lasix 80 IV in ED and Lasix 40 IV BID, now on Lasix IV QD  p/w progressive SOB x1wk  ddx: PE vs new-onset CHF vs symptomatic anemia  Physical Exam: decreased breath sounds b/l, pitting edema b/l (R>L), positive rashard sign    CXR pulmonary congestion   telemonitoring  dimer 483, LE doppler neg   TTE: EF 58%, MR, AR GIIDD, increased RV systolic, LEFT pleural effusion   PE ruled out  due to improvement from diuresis, no need for AC   Cardio, Dr. Tovar, consulted

## 2022-03-06 LAB
ALBUMIN SERPL ELPH-MCNC: 2.8 G/DL — LOW (ref 3.5–5)
ALP SERPL-CCNC: 91 U/L — SIGNIFICANT CHANGE UP (ref 40–120)
ALT FLD-CCNC: 12 U/L DA — SIGNIFICANT CHANGE UP (ref 10–60)
ANION GAP SERPL CALC-SCNC: 3 MMOL/L — LOW (ref 5–17)
AST SERPL-CCNC: 12 U/L — SIGNIFICANT CHANGE UP (ref 10–40)
BILIRUB SERPL-MCNC: 0.4 MG/DL — SIGNIFICANT CHANGE UP (ref 0.2–1.2)
BUN SERPL-MCNC: 36 MG/DL — HIGH (ref 7–18)
CALCIUM SERPL-MCNC: 8.2 MG/DL — LOW (ref 8.4–10.5)
CHLORIDE SERPL-SCNC: 97 MMOL/L — SIGNIFICANT CHANGE UP (ref 96–108)
CO2 SERPL-SCNC: 30 MMOL/L — SIGNIFICANT CHANGE UP (ref 22–31)
CREAT SERPL-MCNC: 1.1 MG/DL — SIGNIFICANT CHANGE UP (ref 0.5–1.3)
EGFR: 48 ML/MIN/1.73M2 — LOW
GLUCOSE SERPL-MCNC: 80 MG/DL — SIGNIFICANT CHANGE UP (ref 70–99)
HCT VFR BLD CALC: 24.1 % — LOW (ref 34.5–45)
HGB BLD-MCNC: 7.8 G/DL — LOW (ref 11.5–15.5)
MAGNESIUM SERPL-MCNC: 2.2 MG/DL — SIGNIFICANT CHANGE UP (ref 1.6–2.6)
MCHC RBC-ENTMCNC: 28.9 PG — SIGNIFICANT CHANGE UP (ref 27–34)
MCHC RBC-ENTMCNC: 32.4 GM/DL — SIGNIFICANT CHANGE UP (ref 32–36)
MCV RBC AUTO: 89.3 FL — SIGNIFICANT CHANGE UP (ref 80–100)
NRBC # BLD: 0 /100 WBCS — SIGNIFICANT CHANGE UP (ref 0–0)
PHOSPHATE SERPL-MCNC: 4.2 MG/DL — SIGNIFICANT CHANGE UP (ref 2.5–4.5)
PLATELET # BLD AUTO: 439 K/UL — HIGH (ref 150–400)
POTASSIUM SERPL-MCNC: 4.7 MMOL/L — SIGNIFICANT CHANGE UP (ref 3.5–5.3)
POTASSIUM SERPL-SCNC: 4.7 MMOL/L — SIGNIFICANT CHANGE UP (ref 3.5–5.3)
PROT SERPL-MCNC: 6.5 G/DL — SIGNIFICANT CHANGE UP (ref 6–8.3)
RBC # BLD: 2.7 M/UL — LOW (ref 3.8–5.2)
RBC # FLD: 14 % — SIGNIFICANT CHANGE UP (ref 10.3–14.5)
SODIUM SERPL-SCNC: 130 MMOL/L — LOW (ref 135–145)
WBC # BLD: 9.9 K/UL — SIGNIFICANT CHANGE UP (ref 3.8–10.5)
WBC # FLD AUTO: 9.9 K/UL — SIGNIFICANT CHANGE UP (ref 3.8–10.5)

## 2022-03-06 PROCEDURE — 74176 CT ABD & PELVIS W/O CONTRAST: CPT | Mod: 26

## 2022-03-06 PROCEDURE — 99233 SBSQ HOSP IP/OBS HIGH 50: CPT

## 2022-03-06 PROCEDURE — 74018 RADEX ABDOMEN 1 VIEW: CPT | Mod: 26

## 2022-03-06 PROCEDURE — 99221 1ST HOSP IP/OBS SF/LOW 40: CPT

## 2022-03-06 RX ORDER — CALCIUM CARBONATE 500(1250)
1 TABLET ORAL ONCE
Refills: 0 | Status: COMPLETED | OUTPATIENT
Start: 2022-03-06 | End: 2022-03-06

## 2022-03-06 RX ORDER — IOHEXOL 300 MG/ML
30 INJECTION, SOLUTION INTRAVENOUS ONCE
Refills: 0 | Status: COMPLETED | OUTPATIENT
Start: 2022-03-06 | End: 2022-03-06

## 2022-03-06 RX ORDER — SODIUM CHLORIDE 9 MG/ML
1000 INJECTION, SOLUTION INTRAVENOUS
Refills: 0 | Status: DISCONTINUED | OUTPATIENT
Start: 2022-03-06 | End: 2022-03-07

## 2022-03-06 RX ADMIN — GABAPENTIN 300 MILLIGRAM(S): 400 CAPSULE ORAL at 17:53

## 2022-03-06 RX ADMIN — Medication 650 MILLIGRAM(S): at 18:50

## 2022-03-06 RX ADMIN — Medication 650 MILLIGRAM(S): at 13:33

## 2022-03-06 RX ADMIN — Medication 325 MILLIGRAM(S): at 13:31

## 2022-03-06 RX ADMIN — ENOXAPARIN SODIUM 40 MILLIGRAM(S): 100 INJECTION SUBCUTANEOUS at 00:12

## 2022-03-06 RX ADMIN — Medication 650 MILLIGRAM(S): at 17:53

## 2022-03-06 RX ADMIN — Medication 650 MILLIGRAM(S): at 06:43

## 2022-03-06 RX ADMIN — Medication 40 MILLIGRAM(S): at 06:29

## 2022-03-06 RX ADMIN — GABAPENTIN 300 MILLIGRAM(S): 400 CAPSULE ORAL at 06:35

## 2022-03-06 RX ADMIN — SODIUM CHLORIDE 3 MILLILITER(S): 9 INJECTION INTRAMUSCULAR; INTRAVENOUS; SUBCUTANEOUS at 08:12

## 2022-03-06 RX ADMIN — Medication 650 MILLIGRAM(S): at 14:33

## 2022-03-06 RX ADMIN — Medication 650 MILLIGRAM(S): at 07:30

## 2022-03-06 RX ADMIN — CLOPIDOGREL BISULFATE 75 MILLIGRAM(S): 75 TABLET, FILM COATED ORAL at 13:34

## 2022-03-06 RX ADMIN — SODIUM CHLORIDE 40 MILLILITER(S): 9 INJECTION, SOLUTION INTRAVENOUS at 13:37

## 2022-03-06 RX ADMIN — SODIUM CHLORIDE 3 MILLILITER(S): 9 INJECTION INTRAMUSCULAR; INTRAVENOUS; SUBCUTANEOUS at 00:11

## 2022-03-06 RX ADMIN — IOHEXOL 30 MILLILITER(S): 300 INJECTION, SOLUTION INTRAVENOUS at 18:55

## 2022-03-06 RX ADMIN — SODIUM CHLORIDE 3 MILLILITER(S): 9 INJECTION INTRAMUSCULAR; INTRAVENOUS; SUBCUTANEOUS at 21:14

## 2022-03-06 RX ADMIN — SODIUM CHLORIDE 3 MILLILITER(S): 9 INJECTION INTRAMUSCULAR; INTRAVENOUS; SUBCUTANEOUS at 13:38

## 2022-03-06 RX ADMIN — Medication 1 TABLET(S): at 06:29

## 2022-03-06 NOTE — PROGRESS NOTE ADULT - ASSESSMENT
Ileus (unknown reason)  acute hypoxic respiratory failure resolved   Acute diastolic heart failure  Iron deficiency anemia   BL knee pain due to OA  HTN  H/o TIA   DM      Plan:   Abd exam benign, BS present, will keep NPO, wait for official CT scan result, possible GI consult of colonoscopy    Very low rate IVF due to NPO status   Off O2, monitor respiratory status  Cont diuresis with lasix 40mg daily  daily weight   Salt and water restriction  TTE noted  Cont tele   Cont Plavix  Hb stable   Ferrous sulfate for ANA MARIA  Tylenol for knee pain, avoid nsaid  Cont home dose Gabapentin  ISS  LE doppler neg  OOB to chair   Hyperkalemia resolved after Lokelma yesterday  Full code  Plan of care will discuss with family once final result of CT abd noted  Ileus (unknown reason)  acute hypoxic respiratory failure resolved   Acute diastolic heart failure  Iron deficiency anemia   BL knee pain due to OA  HTN  H/o TIA   DM      Plan:   Abd exam benign, BS present, will keep NPO, wait for official CT scan result, surgery consult   Very low rate IVF due to NPO status   Off O2, monitor respiratory status  Cont diuresis with lasix 40mg daily  daily weight   Salt and water restriction  TTE noted  Cont tele   Cont Plavix  Hb stable   Ferrous sulfate for ANA MARIA  Tylenol for knee pain, avoid nsaid  Cont home dose Gabapentin  ISS  LE doppler neg  OOB to chair   Hyperkalemia resolved after Lokelma yesterday  Full code  Plan of care was discussed with patient and patient's son- Gerardo Tejada; all questions and concerns were addressed and care was aligned with patient's wishes.

## 2022-03-06 NOTE — PROGRESS NOTE ADULT - SUBJECTIVE AND OBJECTIVE BOX
DATE OF SERVICE:  3/6/2022  Patient was seen and examined on 3/6/2022    .Interim events noted.Consultant notes ,Labs,Telemetry reviewed by me       HOSPITAL COURSE: HPI:  Patient is a 89yoF, AAOx3 and ambulates w/ walker, w/ PMH of CVA, HTN, DM, p/w SOB for a week. She reports progressive SOB. She states she was unable to lay flat due to difficulty of breathing, and she is unable to walk due to SOB. She also endorses recent swelling of her leg bilaterally (R>L). She states no trigger events, which includes no recent sickness. She denies fever, chills, n/v, chest pain, abdominal pain, urinary or bowel movement changes. (03 Mar 2022 03:38)      INTERIM EVENTS:Patient seen at bedside ,interim events noted.      PMH -reviewed admission note, no change since admission  HEART FAILURE: Acute[ ]Chronic[ ] Systolic[ ] Diastolic[ ] Combined Systolic and Diastolic[ ]  CAD[ ] CABG[ ] PCI[ ]  DEVICES[ ] PPM[ ] ICD[ ] ILR[ ]  ATRIAL FIBRILLATION[ ] Paroxysmal[ ] Permanent[ ]  LYNN[ ] CKD1[ ] CKD2[ ] CKD3[ ] CKD4[ ] ESRD[ ]  COPD[ ] HTN[ ]   DM[ ] Type1[ ] Type 2[ ]   CVA[ ] Paresis[ ]    AMBULATION: Assisted[ ] Cane/walker[ ] Independent[ ]    MEDICATIONS  (STANDING):  acetaminophen     Tablet .. 650 milliGRAM(s) Oral every 6 hours  clopidogrel Tablet 75 milliGRAM(s) Oral daily  dextrose 5% + sodium chloride 0.9%. 1000 milliLiter(s) (40 mL/Hr) IV Continuous <Continuous>  enoxaparin Injectable 40 milliGRAM(s) SubCutaneous every 24 hours  ferrous    sulfate 325 milliGRAM(s) Oral daily  furosemide   Injectable 40 milliGRAM(s) IV Push daily  gabapentin 300 milliGRAM(s) Oral two times a day  sodium chloride 0.9% lock flush 3 milliLiter(s) IV Push every 8 hours    MEDICATIONS  (PRN):            REVIEW OF SYSTEMS:  Constitutional: [ ] fever, [ ]weight loss,  [ ]fatigue  Eyes: [ ] visual changes  Respiratory: [ ]shortness of breath;  [ ] cough, [ ]wheezing, [ ]chills, [ ]hemoptysis  Cardiovascular: [ ] chest pain, [ ]palpitations, [ ]dizziness,  [ ]leg swelling[ ]orthopnea[ ]PND  Gastrointestinal: [ ] abdominal pain, [ ]nausea, [ ]vomiting,  [ ]diarrhea [ ]Constipation [ ]Melena  Genitourinary: [ ] dysuria, [ ] hematuria [ ]Fernando  Neurologic: [ ] headaches [ ] tremors[ ]weakness [ ]Paralysis Right[ ] Left[ ]  Skin: [ ] itching, [ ]burning, [ ] rashes  Endocrine: [ ] heat or cold intolerance  Musculoskeletal: [ ] joint pain or swelling; [ ] muscle, back, or extremity pain  Psychiatric: [ ] depression, [ ]anxiety, [ ]mood swings, or [ ]difficulty sleeping  Hematologic: [ ] easy bruising, [ ] bleeding gums    [ ] All remaining systems negative except as per above.   [ ]Unable to obtain.  [x] No change in ROS since admission      Vital Signs Last 24 Hrs  T(C): 36.9 (06 Mar 2022 16:15), Max: 36.9 (06 Mar 2022 16:15)  T(F): 98.4 (06 Mar 2022 16:15), Max: 98.4 (06 Mar 2022 16:15)  HR: 63 (06 Mar 2022 16:15) (60 - 67)  BP: 118/40 (06 Mar 2022 16:15) (107/39 - 148/47)  BP(mean): --  RR: 17 (06 Mar 2022 16:15) (17 - 18)  SpO2: 94% (06 Mar 2022 16:15) (93% - 100%)  I&O's Summary      PHYSICAL EXAM:  General: No acute distress BMI-  HEENT: EOMI, PERRL  Neck: Supple, [ ] JVD  Lungs: Equal air entry bilaterally; [ ] rales [ ] wheezing [ ] rhonchi  Heart: Regular rate and rhythm; [x ] murmur   2/6 [ x] systolic [ ] diastolic [ ] radiation[ ] rubs [ ]  gallops  Abdomen: Nontender, bowel sounds present  Extremities: No clubbing, cyanosis, [ ] edema [ ]Pulses  equal and intact  Nervous system:  Alert & Oriented X3, no focal deficits  Psychiatric: Normal affect  Skin: No rashes or lesions    LABS:  03-06    130<L>  |  97  |  36<H>  ----------------------------<  80  4.7   |  30  |  1.10    Ca    8.2<L>      06 Mar 2022 06:56  Phos  4.2     03-06  Mg     2.2     03-06    TPro  6.5  /  Alb  2.8<L>  /  TBili  0.4  /  DBili  x   /  AST  12  /  ALT  12  /  AlkPhos  91  03-06    Creatinine Trend: 1.10<--, 1.15<--, 1.17<--, 0.91<--, 0.86<--, 0.88<--                        7.8    9.90  )-----------( 439      ( 06 Mar 2022 06:56 )             24.1

## 2022-03-06 NOTE — CHART NOTE - NSCHARTNOTEFT_GEN_A_CORE
CT preliminary report came back as "LLL dependent consolid/atelectasis. septal tihckening/ggo rt lung base.mildly dilated sm bowel with ? transition in mid abdo suggesting low grade sbo vs ileus. rec axr f/u."    Will keep patient NPO for now and wait for final report    Will order ABD xray CT preliminary report came back as "LLL dependent consolid/atelectasis. septal tihckening/ggo rt lung base.mildly dilated sm bowel with ? transition in mid abdo suggesting low grade sbo vs ileus. rec axr f/u."    Will keep patient NPO for now and wait for final report    Will order ABD xray    Update: Consulted surgery as final report came out

## 2022-03-06 NOTE — CONSULT NOTE ADULT - SUBJECTIVE AND OBJECTIVE BOX
General Surgery  Consultation Note    Patient is a 89y old  Female who presents with a chief complaint of acute hypoxic respiratory failure (06 Mar 2022 12:08)      HPI:  Patient is a 89yoF, AAOx3 and ambulates w/ walker, w/ PMH of CVA, HTN, DM, p/w SOB for a week. She reports progressive SOB. She states she was unable to lay flat due to difficulty of breathing, and she is unable to walk due to SOB. She also endorses recent swelling of her leg bilaterally (R>L). She states no trigger events, which includes no recent sickness. She denies fever, chills, n/v, chest pain, abdominal pain, urinary or bowel movement changes. (03 Mar 2022 03:38)    INTERVAL HPI:  88 yo F with PMH CVA, HTN, DM admitted for respiratory failure with incidental finding of possible low grade small bowel obstruction seen on CT scan on day of admission.  Pt states she was eating and drinking well at home and in this admission without nausea or vomiting.  Pt  admits to having diarrhea yesterday and has been passing flatus.  Pt denies abdominal pain or discomfort.  Denies ever having a colonoscopy. Abd xray performed yesterday not yet read.     PAST MEDICAL & SURGICAL HISTORY:  Diabetes    History of hypertension    Stroke    High cholesterol    H/o  section  h/o Appendectomy    Allergies    No Known Allergies    Intolerances        MEDICATIONS  (STANDING):  acetaminophen     Tablet .. 650 milliGRAM(s) Oral every 6 hours  clopidogrel Tablet 75 milliGRAM(s) Oral daily  dextrose 5% + sodium chloride 0.9%. 1000 milliLiter(s) (40 mL/Hr) IV Continuous <Continuous>  enoxaparin Injectable 40 milliGRAM(s) SubCutaneous every 24 hours  ferrous    sulfate 325 milliGRAM(s) Oral daily  furosemide   Injectable 40 milliGRAM(s) IV Push daily  gabapentin 300 milliGRAM(s) Oral two times a day  sodium chloride 0.9% lock flush 3 milliLiter(s) IV Push every 8 hours    MEDICATIONS  (PRN):      Vital Signs Last 24 Hrs  T(C): 36.5 (06 Mar 2022 11:13), Max: 36.9 (05 Mar 2022 18:53)  T(F): 97.7 (06 Mar 2022 11:13), Max: 98.4 (05 Mar 2022 18:53)  HR: 60 (06 Mar 2022 11:13) (52 - 65)  BP: 116/41 (06 Mar 2022 11:13) (103/49 - 124/45)  BP(mean): --  RR: 18 (06 Mar 2022 11:13) (17 - 18)  SpO2: 98% (06 Mar 2022 11:13) (93% - 98%)    Physical Exam:  Gen: awake, alert oriented NAD  HEENT: anicteric  Abd: well healed surgical wounds, soft NT ND, no suprapubic fullness or tenderness  Ext: warm to touch no c/c/e    Labs:                          7.8    9.90  )-----------( 439      ( 06 Mar 2022 06:56 )             24.1     03-06    130<L>  |  97  |  36<H>  ----------------------------<  80  4.7   |  30  |  1.10    Ca    8.2<L>      06 Mar 2022 06:56  Phos  4.2     03-06  Mg     2.2     03-06    TPro  6.5  /  Alb  2.8<L>  /  TBili  0.4  /  DBili  x   /  AST  12  /  ALT  12  /  AlkPhos  91  03-06          Radiological Exams:  < from: CT Abdomen and Pelvis No Cont (22 @ 23:55) >    IMPRESSION:    Mildly dilated fluid-filled small bowel loops with possible transition   zone in mid abdomen to normal caliber distal small bowel; findings may   reflect low-grade small bowel obstruction.  Consider follow-up exam with enteric contrast.    Asymmetric filling defect distal rectum could reflect dense stool.  Recommend further clinical correlation to exclude underlying intrinsic   mucosal lesion or neoplasm.    Bilateral pleural effusions and partial lower lobe atelectasis.    Other findings as discussed above.    This study was preliminary reported by the ED radiologist on 3/6/2022.    < end of copied text >

## 2022-03-06 NOTE — PROGRESS NOTE ADULT - SUBJECTIVE AND OBJECTIVE BOX
Patient is a 89y old  Female who presents with a chief complaint of acute hypoxic respiratory failure (05 Mar 2022 14:26)    patient was seen and examined at bedside   Denies any new complains, but worried about her ct scan result     INTERVAL HPI/OVERNIGHT EVENTS:  T(C): 36.5 (03-06-22 @ 11:13), Max: 36.9 (03-05-22 @ 18:53)  HR: 60 (03-06-22 @ 11:13) (52 - 65)  BP: 116/41 (03-06-22 @ 11:13) (103/49 - 124/45)  RR: 18 (03-06-22 @ 11:13) (17 - 18)  SpO2: 98% (03-06-22 @ 11:13) (93% - 98%)  Wt(kg): --  I&O's Summary      REVIEW OF SYSTEMS: denies fever, chills, SOB, palpitations, chest pain, abdominal pain, nausea, vomiting, diarrhea, constipation, dizziness    MEDICATIONS  (STANDING):  acetaminophen     Tablet .. 650 milliGRAM(s) Oral every 6 hours  clopidogrel Tablet 75 milliGRAM(s) Oral daily  enoxaparin Injectable 40 milliGRAM(s) SubCutaneous every 24 hours  ferrous    sulfate 325 milliGRAM(s) Oral daily  furosemide   Injectable 40 milliGRAM(s) IV Push daily  gabapentin 300 milliGRAM(s) Oral two times a day  sodium chloride 0.9% lock flush 3 milliLiter(s) IV Push every 8 hours    MEDICATIONS  (PRN):      PHYSICAL EXAM:  GENERAL: NAD, well-groomed, well-developed  HEAD:  Atraumatic, Normocephalic  NERVOUS SYSTEM:  Alert & Oriented X3, Good concentration; no focal deficit   CHEST/LUNG: RLL crepitation same as yesterday  HEART: Regular rate and rhythm; No murmurs, rubs, or gallops  ABDOMEN: Soft, Nontender, Nondistended; Bowel sounds present  EXTREMITIES:  2+ Peripheral Pulses, No clubbing, cyanosis, or edema  SKIN: No rashes or lesions    LABS:                        7.8    9.90  )-----------( 439      ( 06 Mar 2022 06:56 )             24.1       130<L>  |  97  |  36<H>  ----------------------------<  80  4.7   |  30  |  1.10    Ca    8.2<L>      06 Mar 2022 06:56  Phos  4.2     03-06  Mg     2.2     03-06    TPro  6.5  /  Alb  2.8<L>  /  TBili  0.4  /  DBili  x   /  AST  12  /  ALT  12  /  AlkPhos  91  03-06        CAPILLARY BLOOD GLUCOSE

## 2022-03-06 NOTE — PHYSICAL THERAPY INITIAL EVALUATION ADULT - PATIENT/FAMILY/SIGNIFICANT OTHER GOALS STATEMENT, PT EVAL
Chief Complaint   Patient presents with     Results     Discuss EEG results     Ariadna Vincent LPN on 8/23/2021 at 11:16 AM     Patient stated that she feels okay

## 2022-03-06 NOTE — PHYSICAL THERAPY INITIAL EVALUATION ADULT - DIAGNOSIS, PT EVAL
Patient presented with impairments in balance during ambulation;unable to ambulate far distance due to constant diarrhea

## 2022-03-07 LAB
ALBUMIN SERPL ELPH-MCNC: 2.6 G/DL — LOW (ref 3.5–5)
ALP SERPL-CCNC: 83 U/L — SIGNIFICANT CHANGE UP (ref 40–120)
ALT FLD-CCNC: 12 U/L DA — SIGNIFICANT CHANGE UP (ref 10–60)
ANION GAP SERPL CALC-SCNC: 9 MMOL/L — SIGNIFICANT CHANGE UP (ref 5–17)
AST SERPL-CCNC: 14 U/L — SIGNIFICANT CHANGE UP (ref 10–40)
BILIRUB SERPL-MCNC: 0.5 MG/DL — SIGNIFICANT CHANGE UP (ref 0.2–1.2)
BUN SERPL-MCNC: 29 MG/DL — HIGH (ref 7–18)
CALCIUM SERPL-MCNC: 8.5 MG/DL — SIGNIFICANT CHANGE UP (ref 8.4–10.5)
CHLORIDE SERPL-SCNC: 98 MMOL/L — SIGNIFICANT CHANGE UP (ref 96–108)
CO2 SERPL-SCNC: 25 MMOL/L — SIGNIFICANT CHANGE UP (ref 22–31)
CREAT SERPL-MCNC: 0.84 MG/DL — SIGNIFICANT CHANGE UP (ref 0.5–1.3)
EGFR: 66 ML/MIN/1.73M2 — SIGNIFICANT CHANGE UP
GLUCOSE SERPL-MCNC: 77 MG/DL — SIGNIFICANT CHANGE UP (ref 70–99)
HCT VFR BLD CALC: 24.6 % — LOW (ref 34.5–45)
HGB BLD-MCNC: 8 G/DL — LOW (ref 11.5–15.5)
MAGNESIUM SERPL-MCNC: 2.1 MG/DL — SIGNIFICANT CHANGE UP (ref 1.6–2.6)
MCHC RBC-ENTMCNC: 28.7 PG — SIGNIFICANT CHANGE UP (ref 27–34)
MCHC RBC-ENTMCNC: 32.5 GM/DL — SIGNIFICANT CHANGE UP (ref 32–36)
MCV RBC AUTO: 88.2 FL — SIGNIFICANT CHANGE UP (ref 80–100)
NRBC # BLD: 0 /100 WBCS — SIGNIFICANT CHANGE UP (ref 0–0)
PHOSPHATE SERPL-MCNC: 4.1 MG/DL — SIGNIFICANT CHANGE UP (ref 2.5–4.5)
PLATELET # BLD AUTO: 468 K/UL — HIGH (ref 150–400)
POTASSIUM SERPL-MCNC: 4.1 MMOL/L — SIGNIFICANT CHANGE UP (ref 3.5–5.3)
POTASSIUM SERPL-SCNC: 4.1 MMOL/L — SIGNIFICANT CHANGE UP (ref 3.5–5.3)
PROT SERPL-MCNC: 6.3 G/DL — SIGNIFICANT CHANGE UP (ref 6–8.3)
RBC # BLD: 2.79 M/UL — LOW (ref 3.8–5.2)
RBC # FLD: 13.9 % — SIGNIFICANT CHANGE UP (ref 10.3–14.5)
SODIUM SERPL-SCNC: 132 MMOL/L — LOW (ref 135–145)
WBC # BLD: 6.67 K/UL — SIGNIFICANT CHANGE UP (ref 3.8–10.5)
WBC # FLD AUTO: 6.67 K/UL — SIGNIFICANT CHANGE UP (ref 3.8–10.5)

## 2022-03-07 PROCEDURE — 99232 SBSQ HOSP IP/OBS MODERATE 35: CPT

## 2022-03-07 PROCEDURE — 99233 SBSQ HOSP IP/OBS HIGH 50: CPT | Mod: GC

## 2022-03-07 RX ORDER — FUROSEMIDE 40 MG
40 TABLET ORAL DAILY
Refills: 0 | Status: DISCONTINUED | OUTPATIENT
Start: 2022-03-08 | End: 2022-03-09

## 2022-03-07 RX ADMIN — Medication 325 MILLIGRAM(S): at 11:34

## 2022-03-07 RX ADMIN — Medication 40 MILLIGRAM(S): at 05:12

## 2022-03-07 RX ADMIN — GABAPENTIN 300 MILLIGRAM(S): 400 CAPSULE ORAL at 05:11

## 2022-03-07 RX ADMIN — Medication 650 MILLIGRAM(S): at 18:16

## 2022-03-07 RX ADMIN — Medication 650 MILLIGRAM(S): at 11:34

## 2022-03-07 RX ADMIN — Medication 650 MILLIGRAM(S): at 05:12

## 2022-03-07 RX ADMIN — ENOXAPARIN SODIUM 40 MILLIGRAM(S): 100 INJECTION SUBCUTANEOUS at 00:01

## 2022-03-07 RX ADMIN — Medication 650 MILLIGRAM(S): at 17:09

## 2022-03-07 RX ADMIN — GABAPENTIN 300 MILLIGRAM(S): 400 CAPSULE ORAL at 17:08

## 2022-03-07 RX ADMIN — Medication 650 MILLIGRAM(S): at 12:06

## 2022-03-07 RX ADMIN — SODIUM CHLORIDE 3 MILLILITER(S): 9 INJECTION INTRAMUSCULAR; INTRAVENOUS; SUBCUTANEOUS at 05:11

## 2022-03-07 RX ADMIN — SODIUM CHLORIDE 3 MILLILITER(S): 9 INJECTION INTRAMUSCULAR; INTRAVENOUS; SUBCUTANEOUS at 13:05

## 2022-03-07 RX ADMIN — CLOPIDOGREL BISULFATE 75 MILLIGRAM(S): 75 TABLET, FILM COATED ORAL at 11:34

## 2022-03-07 RX ADMIN — SODIUM CHLORIDE 3 MILLILITER(S): 9 INJECTION INTRAMUSCULAR; INTRAVENOUS; SUBCUTANEOUS at 21:49

## 2022-03-07 RX ADMIN — ENOXAPARIN SODIUM 40 MILLIGRAM(S): 100 INJECTION SUBCUTANEOUS at 23:27

## 2022-03-07 NOTE — PROGRESS NOTE ADULT - ASSESSMENT
88 y/o Female with resolved SBO    -CT abd/ pelvis results noted   -Advance diet as tolerated  -Care as per medical team   -Will follow

## 2022-03-07 NOTE — PROGRESS NOTE ADULT - SUBJECTIVE AND OBJECTIVE BOX
INTERVAL HPI/OVERNIGHT EVENTS:    Pt seen and examined at bedside. No acute complaints at this time, denies abdominal pain, no nausea or vomiting, +flatus/ BM. Pt is NPO    Vital Signs Last 24 Hrs  T(C): 36.4 (07 Mar 2022 07:01), Max: 36.9 (06 Mar 2022 16:15)  T(F): 97.6 (07 Mar 2022 07:01), Max: 98.4 (06 Mar 2022 16:15)  HR: 70 (07 Mar 2022 07:01) (60 - 82)  BP: 126/44 (07 Mar 2022 07:01) (116/41 - 158/60)  BP(mean): --  RR: 18 (07 Mar 2022 07:01) (17 - 18)  SpO2: 96% (07 Mar 2022 07:01) (94% - 100%)  I&O's Detail        Physical Exam  General: AAOx3, No acute distress  Skin: No jaundice, no icterus  Abdomen: soft,  nondistended, nontender, no rebound tenderness, no guarding, no palpable masses  Extremities: non edematous, no calf pain bilaterally        Labs:                        8.0    6.67  )-----------( 468      ( 07 Mar 2022 07:07 )             24.6     03-07    132<L>  |  98  |  29<H>  ----------------------------<  77  4.1   |  25  |  0.84    Ca    8.5      07 Mar 2022 07:07  Phos  4.1     03-07  Mg     2.1     03-07    TPro  6.3  /  Alb  2.6<L>  /  TBili  0.5  /  DBili  x   /  AST  14  /  ALT  12  /  AlkPhos  83  03-07

## 2022-03-07 NOTE — PROGRESS NOTE ADULT - PROBLEM SELECTOR PLAN 3
Hgb 7.8 (unknown baseline)  held empiric AC given anemia, PE r/o   Iron low and ferritin low   started on ferrous sulfate   f/u CT abdomen/pelvis r/o occult malignancy   monitor cbc Hgb 7.8 (unknown baseline)  held empiric AC given anemia, PE r/o   Iron low and ferritin low   started on ferrous sulfate   CT abdomen/pelvis: Possible mural thickening of the rectum may be due to underdistention, proctitis, or rectal cancer.  monitor cbc Hgb 7.8 (unknown baseline)  held empiric AC given anemia, PE r/o   Iron low and ferritin low   started on ferrous sulfate   CT abdomen/pelvis: Possible mural thickening of the rectum may be due to underdistention, proctitis, or rectal cancer.  GI Mya consulted

## 2022-03-07 NOTE — PROGRESS NOTE ADULT - ATTENDING COMMENTS
Patient was seen and examined   Denies any abd pain, nausea, vomiting, tolerating solid food but does not have appetite, has moved bowel twice today    Vitals noted     P/E:  NAD, pleasant lady   AAOx3, no focal deficit   S1S2 WNL, no MRG   BL scattered crepitations in lungs   Abd soft, non tender, BS present   BL LE no edema   BL Knee tenderness- improved, no erythema or swelling     labs noted     A/P:  acute hypoxic respiratory failure resolved   Acute diastolic heart failure  Concern for SBO, resolved   Rectal wall thickening concern for inflammation/malignancy  Chronic anemia   BL knee pain due to OA  HTN  H/o TIA   DM    Plan:   Monitor closely if patient tolerates food. GI consult for rectal thickening   Off O2, monitor respiratory status  Change Lasix 40mg to PO daily  daily weight   Salt and water restriction  TTE noted  DC tele   Cont Plavix  Hb stable   Cont Ferrous sulfate for anemia  Tylenol for knee pain, avoid nsaid  Cont home dose Gabapentin  ISS  LE doppler neg  OOB to chair   Full code

## 2022-03-07 NOTE — PROGRESS NOTE ADULT - SUBJECTIVE AND OBJECTIVE BOX
PGY-1 Progress Note discussed with attending    PAGER #: [[phone]] TILL 5:00 PM  PLEASE CONTACT ON CALL TEAM:  - On Call Team (Please refer to Joe) FROM 5:00 PM - 8:30PM  - Nightfloat Team FROM 8:30 -7:30 AM    CHIEF COMPLAINT & BRIEF HOSPITAL COURSE:    INTERVAL HPI/OVERNIGHT EVENTS:       REVIEW OF SYSTEMS:  CONSTITUTIONAL: No fever, night sweats, weight loss, or fatigue  RESPIRATORY: No cough, wheezing, or hemoptysis; No shortness of breath  CARDIOVASCULAR: No chest pain, palpitations, dizziness, or leg swelling  GASTROINTESTINAL: No abdominal pain. No nausea, vomiting, or hematemesis; No diarrhea or constipation. No melena or hematochezia.  GENITOURINARY: No dysuria or hematuria, no urinary frequency  NEUROLOGICAL: No headaches, memory loss, loss of strength, numbness, or tremors  SKIN: No itching, burning, rashes, or lesions     Vital Signs Last 24 Hrs  T(C): 36.5 (07 Mar 2022 11:03), Max: 36.9 (06 Mar 2022 16:15)  T(F): 97.7 (07 Mar 2022 11:03), Max: 98.4 (06 Mar 2022 16:15)  HR: 73 (07 Mar 2022 11:03) (63 - 82)  BP: 116/45 (07 Mar 2022 11:03) (116/45 - 158/60)  BP(mean): --  RR: 18 (07 Mar 2022 11:03) (17 - 18)  SpO2: 100% (07 Mar 2022 11:03) (94% - 100%)    PHYSICAL EXAMINATION:  GENERAL: NAD, well built  HEAD:  Atraumatic, Normocephalic  EYES:  conjunctiva and sclera clear  NECK: Supple, No JVD, thyroid not examined   CHEST/LUNG: Clear to auscultation. No wheezing, rales, rubs or rhonchi  HEART: Regular rate and rhythm; Clear S1 and S2, No murmurs, rubs, or gallops  ABDOMEN: Soft, Nontender, Nondistended; Bowel sounds present  NERVOUS SYSTEM:  Alert & Oriented X3, CNII-XII intact, no focal neurological deficits   EXTREMITIES:  2+ Peripheral Pulses, No clubbing, cyanosis, or edema  SKIN: warm dry, no lesions noted                           8.0    6.67  )-----------( 468      ( 07 Mar 2022 07:07 )             24.6     03-07    132<L>  |  98  |  29<H>  ----------------------------<  77  4.1   |  25  |  0.84    Ca    8.5      07 Mar 2022 07:07  Phos  4.1     03-07  Mg     2.1     03-07    TPro  6.3  /  Alb  2.6<L>  /  TBili  0.5  /  DBili  x   /  AST  14  /  ALT  12  /  AlkPhos  83  03-07    LIVER FUNCTIONS - ( 07 Mar 2022 07:07 )  Alb: 2.6 g/dL / Pro: 6.3 g/dL / ALK PHOS: 83 U/L / ALT: 12 U/L DA / AST: 14 U/L / GGT: x                   CAPILLARY BLOOD GLUCOSE    MEDICATIONS  (STANDING):  acetaminophen     Tablet .. 650 milliGRAM(s) Oral every 6 hours  clopidogrel Tablet 75 milliGRAM(s) Oral daily  enoxaparin Injectable 40 milliGRAM(s) SubCutaneous every 24 hours  ferrous    sulfate 325 milliGRAM(s) Oral daily  furosemide   Injectable 40 milliGRAM(s) IV Push daily  gabapentin 300 milliGRAM(s) Oral two times a day  sodium chloride 0.9% lock flush 3 milliLiter(s) IV Push every 8 hours    MEDICATIONS  (PRN):      RADIOLOGY & ADDITIONAL TESTS:               PGY-1 Progress Note discussed with attending    PAGER #: [[phone]] TILL 5:00 PM  PLEASE CONTACT ON CALL TEAM:  - On Call Team (Please refer to Joe) FROM 5:00 PM - 8:30PM  - Nightfloat Team FROM 8:30 -7:30 AM    CHIEF COMPLAINT & BRIEF HOSPITAL COURSE:  Patient is a 89yoF, AAOx3 and ambulates w/ walker, w/ PMH of CVA, HTN, DM, p/w SOB for a week. Admitted for acute hypoxic respiratory failure requiring oxygen supplementation     INTERVAL HPI/OVERNIGHT EVENTS:   No overnight events. CT shows no SBO. Diet advanced today. c/w DC planning    REVIEW OF SYSTEMS:  CONSTITUTIONAL: No fever, night sweats, weight loss, or fatigue  RESPIRATORY: No cough, wheezing, or hemoptysis; No shortness of breath  CARDIOVASCULAR: No chest pain, palpitations, dizziness, or leg swelling  GASTROINTESTINAL: No abdominal pain. No nausea, vomiting, or hematemesis; No diarrhea or constipation. No melena or hematochezia.  GENITOURINARY: No dysuria or hematuria, no urinary frequency  NEUROLOGICAL: No headaches, memory loss, loss of strength, numbness, or tremors  SKIN: No itching, burning, rashes, or lesions     Vital Signs Last 24 Hrs  T(C): 36.5 (07 Mar 2022 11:03), Max: 36.9 (06 Mar 2022 16:15)  T(F): 97.7 (07 Mar 2022 11:03), Max: 98.4 (06 Mar 2022 16:15)  HR: 73 (07 Mar 2022 11:03) (63 - 82)  BP: 116/45 (07 Mar 2022 11:03) (116/45 - 158/60)  BP(mean): --  RR: 18 (07 Mar 2022 11:03) (17 - 18)  SpO2: 100% (07 Mar 2022 11:03) (94% - 100%)    PHYSICAL EXAMINATION:  GENERAL: NAD, well built  HEAD:  Atraumatic, Normocephalic  EYES:  conjunctiva and sclera clear  NECK: Supple, No JVD, thyroid not examined   CHEST/LUNG: Clear to auscultation. No wheezing, rales, rubs or rhonchi  HEART: Regular rate and rhythm; Clear S1 and S2, No murmurs, rubs, or gallops  ABDOMEN: Soft, Nontender, Nondistended; Bowel sounds present  NERVOUS SYSTEM:  Alert & Oriented X3, CNII-XII intact, no focal neurological deficits   EXTREMITIES:  2+ Peripheral Pulses, No clubbing, cyanosis, or edema  SKIN: warm dry, no lesions noted                           8.0    6.67  )-----------( 468      ( 07 Mar 2022 07:07 )             24.6     03-07    132<L>  |  98  |  29<H>  ----------------------------<  77  4.1   |  25  |  0.84    Ca    8.5      07 Mar 2022 07:07  Phos  4.1     03-07  Mg     2.1     03-07    TPro  6.3  /  Alb  2.6<L>  /  TBili  0.5  /  DBili  x   /  AST  14  /  ALT  12  /  AlkPhos  83  03-07    LIVER FUNCTIONS - ( 07 Mar 2022 07:07 )  Alb: 2.6 g/dL / Pro: 6.3 g/dL / ALK PHOS: 83 U/L / ALT: 12 U/L DA / AST: 14 U/L / GGT: x               CAPILLARY BLOOD GLUCOSE    MEDICATIONS  (STANDING):  acetaminophen     Tablet .. 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    --- End of Report ---    *** END OF ADDENDUM***      PROCEDURE DATE:  03/06/2022          INTERPRETATION:  CLINICAL INFORMATION: Low-grade small bowel obstruction    COMPARISON: 3/5/2022    CONTRAST/COMPLICATIONS:  IV Contrast: NONE  Oral Contrast: Omnipaque 300  Complications: None reported at time of study completion    PROCEDURE:  CT of the Abdomen and Pelvis was performed.  Sagittal and coronal reformats were performed.    FINDINGS:  LOWER CHEST: Mild bilateral pleural effusions, grossly unchanged on the   right and slightly decreased on the left. Mild bilateral atelectasis.    LIVER: Within normal limits.  BILE DUCTS: Normal caliber.  GALLBLADDER: Cholelithiasis. No evidence for thickened gallbladder wall   or pericholecystic fluid.  SPLEEN: Within normal limits.  PANCREAS: Within normal limits.  ADRENALS: Within normal limits.  KIDNEYS/URETERS: Stable hypodense lesions in the right kidney,   representing probable cysts. No evidence for a calculus in the kidneys or   ureters. No hydronephrosis.    BLADDER: Stable small air in the urinary bladder may be due to prior   Fernando catheter placement or cystitis. Clinical correlation is recommended.  REPRODUCTIVE ORGANS: Mild fullness of the posterior lower uterine segment   may be due to a fibroid. The adnexa appear grossly unremarkable.    BOWEL: No bowel obstruction. Colonic diverticulosis without evidence of   diverticulitis. Appendix not visualized. No secondary signs for acute   appendicitis. Possible mural thickening of the rectum may be due to   underdistention, proctitis, or rectal cancer. Clinical correlation is   recommended.  PERITONEUM: No free air or ascites.  VESSELS: Calcified atherosclerotic disease.  RETROPERITONEUM/LYMPH NODES: No lymphadenopathy.  ABDOMINAL WALL: Stable fat-containing ventral hernia.  BONES: Degenerative spondylosis. Grade 1 anterolisthesis at L4-5.    IMPRESSION:  No bowel obstruction. Colonic diverticulosis without evidence of   diverticulitis. Appendix not visualized. No secondary signs for acute   appendicitis. Possible mural thickening of the rectum may be due to   underdistention, proctitis, or rectal cancer. Clinical correlation is   recommended.    Stable small air in the urinary bladder may be due to prior Fernando   catheter placement or cystitis. Clinical correlation is recommended.    Mild bilateral pleural effusions.    A preliminary report was provided by DYLLAN NATION MD    --- End of Report ---    ***Please see the addendum at the top of this report. It may contain   additional important information or changes.****        MARIPOSA PATINO MD; Attending Radiologist  This document has been electronically signed. Mar  7 2022 10:51AM  Addend:MARIPOSA PATINO MD; Attending Radiologist  This addendum was electronically signed on: Mar  7 2022 11:55AM.

## 2022-03-07 NOTE — PROGRESS NOTE ADULT - NSPROGADDITIONALINFOA_GEN_ALL_CORE
DISCHARGE INSTRUCTIONS:    FOLLOW UP WITH YOUR PRIMARY CARE PROVIDER OR THE [address] [place] [address]  MAKE AN APPOINTMENT TO BE SEEN  TAKE TYLENOL OR MOTRIN FOR PAIN  IF RASH, SHORTNESS OF BREATH OR TROUBLE SWALLOWING, STOP TAKING THE MEDICATION AND BE SEEN  FOLLOW UP WITH THE RECOMMENDED ORTHOPEDIC SPECIALIST  MAKE AN APPOINTMENT TO BE SEEN  REST, ICE AND ELEVATE THE AREA  WEAR THE SPLINT UNTIL SEEN BY THE RECOMMENDED ORTHOPEDIC SPECIALIST  DO NOT GET THE SPLINT WET  DO NOT TAKE THE SPLINT OFF  IF SYMPTOMS WORSEN OR NEW SYMPTOMS ARISE, RETURN TO THE ER TO BE SEEN  I have personally seen and examined the patient.  I fully participated in the care of this patient.  I have made amendments to the documentation where necessary, and agree with the history, physical exam, and plan as documented by the ACP.

## 2022-03-07 NOTE — PROGRESS NOTE ADULT - PROBLEM SELECTOR PLAN 2
h/o of OA  s/p tylenol IV x1, responded well pain improved  c/w tylenol PRN  f/u PT eval h/o of OA  s/p tylenol IV x1, responded well pain improved  c/w tylenol PRN  PTP: home PT h/o of OA  s/p tylenol IV x1, responded well pain improved  c/w tylenol PRN  PT: home PT

## 2022-03-07 NOTE — PROGRESS NOTE ADULT - SUBJECTIVE AND OBJECTIVE BOX
DATE OF SERVICE:  3/7/2022  Patient was seen and examined on  3/7/2022   .Interim events noted.Consultant notes ,Labs,Telemetry reviewed by me       HOSPITAL COURSE: HPI:  Patient is a 89yoF, AAOx3 and ambulates w/ walker, w/ PMH of CVA, HTN, DM, p/w SOB for a week. She reports progressive SOB. She states she was unable to lay flat due to difficulty of breathing, and she is unable to walk due to SOB. She also endorses recent swelling of her leg bilaterally (R>L). She states no trigger events, which includes no recent sickness. She denies fever, chills, n/v, chest pain, abdominal pain, urinary or bowel movement changes. (03 Mar 2022 03:38)      INTERIM EVENTS:Patient seen at bedside ,interim events noted.      PMH -reviewed admission note, no change since admission  HEART FAILURE: Acute[ ]Chronic[ ] Systolic[ ] Diastolic[ ] Combined Systolic and Diastolic[ ]  CAD[ ] CABG[ ] PCI[ ]  DEVICES[ ] PPM[ ] ICD[ ] ILR[ ]  ATRIAL FIBRILLATION[ ] Paroxysmal[ ] Permanent[ ]  LYNN[ ] CKD1[ ] CKD2[ ] CKD3[ ] CKD4[ ] ESRD[ ]  COPD[ ] HTN[ ]   DM[ ] Type1[ ] Type 2[ ]   CVA[ ] Paresis[ ]    AMBULATION: Assisted[ ] Cane/walker[ ] Independent[ ]    MEDICATIONS  (STANDING):  acetaminophen     Tablet .. 650 milliGRAM(s) Oral every 6 hours  clopidogrel Tablet 75 milliGRAM(s) Oral daily  enoxaparin Injectable 40 milliGRAM(s) SubCutaneous every 24 hours  ferrous    sulfate 325 milliGRAM(s) Oral daily  gabapentin 300 milliGRAM(s) Oral two times a day  sodium chloride 0.9% lock flush 3 milliLiter(s) IV Push every 8 hours    MEDICATIONS  (PRN):            REVIEW OF SYSTEMS:  Constitutional: [ ] fever, [ ]weight loss,  [ ]fatigue  Eyes: [ ] visual changes  Respiratory: [ ]shortness of breath;  [ ] cough, [ ]wheezing, [ ]chills, [ ]hemoptysis  Cardiovascular: [ ] chest pain, [ ]palpitations, [ ]dizziness,  [ ]leg swelling[ ]orthopnea[ ]PND  Gastrointestinal: [ ] abdominal pain, [ ]nausea, [ ]vomiting,  [ ]diarrhea [ ]Constipation [ ]Melena  Genitourinary: [ ] dysuria, [ ] hematuria [ ]Fernando  Neurologic: [ ] headaches [ ] tremors[ ]weakness [ ]Paralysis Right[ ] Left[ ]  Skin: [ ] itching, [ ]burning, [ ] rashes  Endocrine: [ ] heat or cold intolerance  Musculoskeletal: [ ] joint pain or swelling; [ ] muscle, back, or extremity pain  Psychiatric: [ ] depression, [ ]anxiety, [ ]mood swings, or [ ]difficulty sleeping  Hematologic: [ ] easy bruising, [ ] bleeding gums    [ ] All remaining systems negative except as per above.   [ ]Unable to obtain.  [x] No change in ROS since admission      Vital Signs Last 24 Hrs  T(C): 36.3 (07 Mar 2022 15:41), Max: 36.8 (07 Mar 2022 05:20)  T(F): 97.3 (07 Mar 2022 15:41), Max: 98.3 (07 Mar 2022 05:20)  HR: 76 (07 Mar 2022 15:41) (68 - 82)  BP: 171/56 (07 Mar 2022 15:41) (116/45 - 171/56)  BP(mean): --  RR: 18 (07 Mar 2022 15:41) (17 - 18)  SpO2: 96% (07 Mar 2022 15:41) (96% - 100%)  I&O's Summary      PHYSICAL EXAM:  General: No acute distress BMI-  HEENT: EOMI, PERRL  Neck: Supple, [ ] JVD  Lungs: Equal air entry bilaterally; [ ] rales [ ] wheezing [ ] rhonchi  Heart: Regular rate and rhythm; [x ] murmur   2/6 [ x] systolic [ ] diastolic [ ] radiation[ ] rubs [ ]  gallops  Abdomen: Nontender, bowel sounds present  Extremities: No clubbing, cyanosis, [ ] edema [ ]Pulses  equal and intact  Nervous system:  Alert & Oriented X3, no focal deficits  Psychiatric: Normal affect  Skin: No rashes or lesions    LABS:  03-07    132<L>  |  98  |  29<H>  ----------------------------<  77  4.1   |  25  |  0.84    Ca    8.5      07 Mar 2022 07:07  Phos  4.1     03-07  Mg     2.1     03-07    TPro  6.3  /  Alb  2.6<L>  /  TBili  0.5  /  DBili  x   /  AST  14  /  ALT  12  /  AlkPhos  83  03-07    Creatinine Trend: 0.84<--, 1.10<--, 1.15<--, 1.17<--, 0.91<--, 0.86<--                        8.0    6.67  )-----------( 468      ( 07 Mar 2022 07:07 )             24.6

## 2022-03-07 NOTE — PROGRESS NOTE ADULT - PROBLEM SELECTOR PLAN 1
p/w progressive SOB x1wk  ddx: PE vs new-onset CHF vs symptomatic anemia  physical exam: decreased breath sounds b/l, pitting edema b/l (R>L), positive rashard sign    CXR pulmonary congestion   s/p furosemide 80 in ED  off O2, saturating well on RA   c/w 40mg IV lasix BID, transitioned to Lasix IV QD for up trending SCr  telemonitoring  dimer 483, LE doppler neg   Cardio, Dr. Tovar, consulted p/w progressive SOB x1wk  ddx: PE vs new-onset CHF vs symptomatic anemia  physical exam: decreased breath sounds b/l, pitting edema b/l (R>L), positive rashard sign    CXR pulmonary congestion   s/p furosemide 80 in ED  off O2, saturating well on RA   c/w 40mg IV lasix BID, transitioned to Lasix IV QD for up trending SCr ->PO lasix from tomorrow (3/8)  telemonitoring  dimer 483, LE doppler neg   Cardio, Dr. Tovar, consulted

## 2022-03-07 NOTE — PROGRESS NOTE ADULT - PROBLEM SELECTOR PLAN 1
p/w progressive SOB x1wk  ddx: PE vs new-onset CHF vs symptomatic anemia    CXR pulmonary congestion   s/p furosemide 80 in ED  off O2, saturating well on RA   c/w 40mg IV lasix BID, transitioned to Lasix IV QD for up trending SCr ->PO lasix from tomorrow (3/8)  Cont TEle  dimer 483, LE doppler neg

## 2022-03-07 NOTE — PROGRESS NOTE ADULT - PROBLEM SELECTOR PLAN 2
h/o HTN, on unknown dose of lisinopril and amlodipine  - hold home medications for now  - primary team to follow up on home dose

## 2022-03-08 LAB
ANION GAP SERPL CALC-SCNC: 5 MMOL/L — SIGNIFICANT CHANGE UP (ref 5–17)
BUN SERPL-MCNC: 27 MG/DL — HIGH (ref 7–18)
CALCIUM SERPL-MCNC: 8.9 MG/DL — SIGNIFICANT CHANGE UP (ref 8.4–10.5)
CHLORIDE SERPL-SCNC: 101 MMOL/L — SIGNIFICANT CHANGE UP (ref 96–108)
CO2 SERPL-SCNC: 28 MMOL/L — SIGNIFICANT CHANGE UP (ref 22–31)
CREAT SERPL-MCNC: 0.83 MG/DL — SIGNIFICANT CHANGE UP (ref 0.5–1.3)
EGFR: 67 ML/MIN/1.73M2 — SIGNIFICANT CHANGE UP
GLUCOSE SERPL-MCNC: 94 MG/DL — SIGNIFICANT CHANGE UP (ref 70–99)
HCT VFR BLD CALC: 28.5 % — LOW (ref 34.5–45)
HGB BLD-MCNC: 8.7 G/DL — LOW (ref 11.5–15.5)
MCHC RBC-ENTMCNC: 27.5 PG — SIGNIFICANT CHANGE UP (ref 27–34)
MCHC RBC-ENTMCNC: 30.5 GM/DL — LOW (ref 32–36)
MCV RBC AUTO: 90.2 FL — SIGNIFICANT CHANGE UP (ref 80–100)
NRBC # BLD: 0 /100 WBCS — SIGNIFICANT CHANGE UP (ref 0–0)
PLATELET # BLD AUTO: 538 K/UL — HIGH (ref 150–400)
POTASSIUM SERPL-MCNC: 4 MMOL/L — SIGNIFICANT CHANGE UP (ref 3.5–5.3)
POTASSIUM SERPL-SCNC: 4 MMOL/L — SIGNIFICANT CHANGE UP (ref 3.5–5.3)
RBC # BLD: 3.16 M/UL — LOW (ref 3.8–5.2)
RBC # FLD: 13.6 % — SIGNIFICANT CHANGE UP (ref 10.3–14.5)
SODIUM SERPL-SCNC: 134 MMOL/L — LOW (ref 135–145)
WBC # BLD: 6.26 K/UL — SIGNIFICANT CHANGE UP (ref 3.8–10.5)
WBC # FLD AUTO: 6.26 K/UL — SIGNIFICANT CHANGE UP (ref 3.8–10.5)

## 2022-03-08 PROCEDURE — 99222 1ST HOSP IP/OBS MODERATE 55: CPT

## 2022-03-08 PROCEDURE — 99233 SBSQ HOSP IP/OBS HIGH 50: CPT | Mod: GC

## 2022-03-08 PROCEDURE — 99232 SBSQ HOSP IP/OBS MODERATE 35: CPT

## 2022-03-08 RX ORDER — SIMVASTATIN 20 MG/1
10 TABLET, FILM COATED ORAL AT BEDTIME
Refills: 0 | Status: DISCONTINUED | OUTPATIENT
Start: 2022-03-08 | End: 2022-03-09

## 2022-03-08 RX ORDER — DICLOFENAC SODIUM 30 MG/G
0 GEL TOPICAL
Qty: 0 | Refills: 0 | DISCHARGE

## 2022-03-08 RX ORDER — CLOPIDOGREL BISULFATE 75 MG/1
0 TABLET, FILM COATED ORAL
Qty: 0 | Refills: 0 | DISCHARGE

## 2022-03-08 RX ORDER — AMLODIPINE BESYLATE 2.5 MG/1
5 TABLET ORAL DAILY
Refills: 0 | Status: DISCONTINUED | OUTPATIENT
Start: 2022-03-08 | End: 2022-03-09

## 2022-03-08 RX ADMIN — Medication 650 MILLIGRAM(S): at 18:13

## 2022-03-08 RX ADMIN — SODIUM CHLORIDE 3 MILLILITER(S): 9 INJECTION INTRAMUSCULAR; INTRAVENOUS; SUBCUTANEOUS at 13:06

## 2022-03-08 RX ADMIN — Medication 650 MILLIGRAM(S): at 12:17

## 2022-03-08 RX ADMIN — GABAPENTIN 300 MILLIGRAM(S): 400 CAPSULE ORAL at 17:25

## 2022-03-08 RX ADMIN — Medication 650 MILLIGRAM(S): at 23:48

## 2022-03-08 RX ADMIN — Medication 650 MILLIGRAM(S): at 07:10

## 2022-03-08 RX ADMIN — CLOPIDOGREL BISULFATE 75 MILLIGRAM(S): 75 TABLET, FILM COATED ORAL at 11:30

## 2022-03-08 RX ADMIN — Medication 650 MILLIGRAM(S): at 05:55

## 2022-03-08 RX ADMIN — SODIUM CHLORIDE 3 MILLILITER(S): 9 INJECTION INTRAMUSCULAR; INTRAVENOUS; SUBCUTANEOUS at 22:21

## 2022-03-08 RX ADMIN — ENOXAPARIN SODIUM 40 MILLIGRAM(S): 100 INJECTION SUBCUTANEOUS at 23:48

## 2022-03-08 RX ADMIN — Medication 40 MILLIGRAM(S): at 05:55

## 2022-03-08 RX ADMIN — Medication 650 MILLIGRAM(S): at 17:25

## 2022-03-08 RX ADMIN — SODIUM CHLORIDE 3 MILLILITER(S): 9 INJECTION INTRAMUSCULAR; INTRAVENOUS; SUBCUTANEOUS at 05:52

## 2022-03-08 RX ADMIN — Medication 325 MILLIGRAM(S): at 11:30

## 2022-03-08 RX ADMIN — AMLODIPINE BESYLATE 5 MILLIGRAM(S): 2.5 TABLET ORAL at 13:23

## 2022-03-08 RX ADMIN — SIMVASTATIN 10 MILLIGRAM(S): 20 TABLET, FILM COATED ORAL at 22:21

## 2022-03-08 RX ADMIN — GABAPENTIN 300 MILLIGRAM(S): 400 CAPSULE ORAL at 05:55

## 2022-03-08 RX ADMIN — Medication 650 MILLIGRAM(S): at 11:30

## 2022-03-08 NOTE — CONSULT NOTE ADULT - PROBLEM SELECTOR RECOMMENDATION 9
- pt with iron deficiency anemia (Hgb 8.7, unknown baseline)  - on ferrous sulfate   - Repeat CT A/P on 3/6 with possible mural thickening of the rectum may be due to underdistention, proctitis or rectal cancer  - pt is currently on plavix daily. Will have to hold plavix for a week in order to schedule colonoscopy  - pt wish to proceed with outpatient colonoscopy - pt with iron deficiency anemia (Hgb 8.7, unknown baseline)  - on ferrous sulfate   - Repeat CT A/P on 3/6 with possible mural thickening of the rectum may be due to underdistention, proctitis or rectal cancer  - pt is currently on plavix daily. Will have to hold plavix for a week in order to schedule colonoscopy  - pt wishes to hold off on inpatient colonoscopy.

## 2022-03-08 NOTE — PROGRESS NOTE ADULT - PROBLEM SELECTOR PLAN 2
RESOLVED, s/p Lasix 80 IV in ED and Lasix 40 IV BID, now on Lasix IV QD  p/w progressive SOB x1wk  ddx: PE vs new-onset CHF vs symptomatic anemia  Physical Exam: decreased breath sounds b/l, pitting edema b/l (R>L), positive rashard sign    CXR pulmonary congestion   telemonitoring  dimer 483, LE doppler neg   TTE: EF 58%, MR, AR GIIDD, increased RV systolic, LEFT pleural effusion   PE ruled out  due to improvement from diuresis, no need for AC

## 2022-03-08 NOTE — PROGRESS NOTE ADULT - REASON FOR ADMISSION
acute hypoxic respiratory failure

## 2022-03-08 NOTE — PROGRESS NOTE ADULT - PROVIDER SPECIALTY LIST ADULT
Surgery
Internal Medicine
Surgery
Cardiology
Internal Medicine
Internal Medicine

## 2022-03-08 NOTE — CONSULT NOTE ADULT - REASON FOR ADMISSION
acute hypoxic respiratory failure

## 2022-03-08 NOTE — PROGRESS NOTE ADULT - SUBJECTIVE AND OBJECTIVE BOX
DATE OF SERVICE:    Patient was seen and examined on     .Interim events noted.Consultant notes ,Labs,Telemetry reviewed by me       HOSPITAL COURSE: HPI:  Patient is a 89yoF, AAOx3 and ambulates w/ walker, w/ PMH of CVA, HTN, DM, p/w SOB for a week. She reports progressive SOB. She states she was unable to lay flat due to difficulty of breathing, and she is unable to walk due to SOB. She also endorses recent swelling of her leg bilaterally (R>L). She states no trigger events, which includes no recent sickness. She denies fever, chills, n/v, chest pain, abdominal pain, urinary or bowel movement changes. (03 Mar 2022 03:38)      INTERIM EVENTS:Patient seen at bedside ,interim events noted.      PMH -reviewed admission note, no change since admission  HEART FAILURE: Acute[ ]Chronic[ ] Systolic[ ] Diastolic[ ] Combined Systolic and Diastolic[ ]  CAD[ ] CABG[ ] PCI[ ]  DEVICES[ ] PPM[ ] ICD[ ] ILR[ ]  ATRIAL FIBRILLATION[ ] Paroxysmal[ ] Permanent[ ]  LYNN[ ] CKD1[ ] CKD2[ ] CKD3[ ] CKD4[ ] ESRD[ ]  COPD[ ] HTN[ ]   DM[ ] Type1[ ] Type 2[ ]   CVA[ ] Paresis[ ]    AMBULATION: Assisted[ ] Cane/walker[ ] Independent[ ]    MEDICATIONS  (STANDING):  acetaminophen     Tablet .. 650 milliGRAM(s) Oral every 6 hours  amLODIPine   Tablet 5 milliGRAM(s) Oral daily  clopidogrel Tablet 75 milliGRAM(s) Oral daily  enoxaparin Injectable 40 milliGRAM(s) SubCutaneous every 24 hours  ferrous    sulfate 325 milliGRAM(s) Oral daily  furosemide    Tablet 40 milliGRAM(s) Oral daily  gabapentin 300 milliGRAM(s) Oral two times a day  simvastatin 10 milliGRAM(s) Oral at bedtime  sodium chloride 0.9% lock flush 3 milliLiter(s) IV Push every 8 hours    MEDICATIONS  (PRN):            REVIEW OF SYSTEMS:  Constitutional: [ ] fever, [ ]weight loss,  [ ]fatigue  Eyes: [ ] visual changes  Respiratory: [ ]shortness of breath;  [ ] cough, [ ]wheezing, [ ]chills, [ ]hemoptysis  Cardiovascular: [ ] chest pain, [ ]palpitations, [ ]dizziness,  [ ]leg swelling[ ]orthopnea[ ]PND  Gastrointestinal: [ ] abdominal pain, [ ]nausea, [ ]vomiting,  [ ]diarrhea [ ]Constipation [ ]Melena  Genitourinary: [ ] dysuria, [ ] hematuria [ ]Fernando  Neurologic: [ ] headaches [ ] tremors[ ]weakness [ ]Paralysis Right[ ] Left[ ]  Skin: [ ] itching, [ ]burning, [ ] rashes  Endocrine: [ ] heat or cold intolerance  Musculoskeletal: [ ] joint pain or swelling; [ ] muscle, back, or extremity pain  Psychiatric: [ ] depression, [ ]anxiety, [ ]mood swings, or [ ]difficulty sleeping  Hematologic: [ ] easy bruising, [ ] bleeding gums    [ ] All remaining systems negative except as per above.   [ ]Unable to obtain.  [x] No change in ROS since admission      Vital Signs Last 24 Hrs  T(C): 36.6 (08 Mar 2022 16:35), Max: 36.6 (08 Mar 2022 11:32)  T(F): 97.9 (08 Mar 2022 16:35), Max: 97.9 (08 Mar 2022 16:35)  HR: 80 (08 Mar 2022 16:35) (71 - 81)  BP: 153/73 (08 Mar 2022 16:35) (115/64 - 172/54)  BP(mean): --  RR: 16 (08 Mar 2022 16:35) (16 - 18)  SpO2: 94% (08 Mar 2022 16:35) (94% - 98%)  I&O's Summary    07 Mar 2022 07:01  -  08 Mar 2022 07:00  --------------------------------------------------------  IN: 225 mL / OUT: 390 mL / NET: -165 mL    08 Mar 2022 07:01  -  08 Mar 2022 20:39  --------------------------------------------------------  IN: 0 mL / OUT: 500 mL / NET: -500 mL        PHYSICAL EXAM:  General: No acute distress BMI-  HEENT: EOMI, PERRL  Neck: Supple, [ ] JVD  Lungs: Equal air entry bilaterally; [ ] rales [ ] wheezing [ ] rhonchi  Heart: Regular rate and rhythm; [x ] murmur   2/6 [ x] systolic [ ] diastolic [ ] radiation[ ] rubs [ ]  gallops  Abdomen: Nontender, bowel sounds present  Extremities: No clubbing, cyanosis, [ ] edema [ ]Pulses  equal and intact  Nervous system:  Alert & Oriented X3, no focal deficits  Psychiatric: Normal affect  Skin: No rashes or lesions    LABS:  03-08    134<L>  |  101  |  27<H>  ----------------------------<  94  4.0   |  28  |  0.83    Ca    8.9      08 Mar 2022 07:07  Phos  4.1     03-07  Mg     2.1     03-07    TPro  6.3  /  Alb  2.6<L>  /  TBili  0.5  /  DBili  x   /  AST  14  /  ALT  12  /  AlkPhos  83  03-07    Creatinine Trend: 0.83<--, 0.84<--, 1.10<--, 1.15<--, 1.17<--, 0.91<--                        8.7    6.26  )-----------( 538      ( 08 Mar 2022 07:07 )             28.5     < from: Transthoracic Echocardiogram (03.03.22 @ 07:15) >  CONCLUSIONS:  1. Mild posterior mitral annular calcification. Mild mitral  regurgitation.  2. Calcified trileaflet aortic valve with normal opening.  No aortic stenosis. Trace aortic regurgitation.  3. Normal aortic root.  4. Moderately dilated left atrium.  5. Normal left ventricular internal dimensions and wall  thicknesses.  6. Normal Left Ventricular Systolic Function,  (EF = 58% by  biplane).  7. Grade II diastolic dysfunction (moderate).  8. Normal right ventricular size and function.  9. RV systolic pressure is moderately increased at  48 mm  Hg.  10. Normal tricuspid valve. Mild tricuspid regurgitation.  11. No pericardial effusion.  12. Left pleural effusion.      < end of copied text >

## 2022-03-08 NOTE — PROGRESS NOTE ADULT - SUBJECTIVE AND OBJECTIVE BOX
INTERVAL HPI/OVERNIGHT EVENTS:  Pt resting comfortably. No acute complaints.   Tolerating diet, soft/bite size  flatus/BM.   Denies N/V    MEDICATIONS  (STANDING):  acetaminophen     Tablet .. 650 milliGRAM(s) Oral every 6 hours  clopidogrel Tablet 75 milliGRAM(s) Oral daily  enoxaparin Injectable 40 milliGRAM(s) SubCutaneous every 24 hours  ferrous    sulfate 325 milliGRAM(s) Oral daily  furosemide    Tablet 40 milliGRAM(s) Oral daily  gabapentin 300 milliGRAM(s) Oral two times a day  sodium chloride 0.9% lock flush 3 milliLiter(s) IV Push every 8 hours    Vital Signs Last 24 Hrs  T(C): 36.5 (08 Mar 2022 07:34), Max: 36.5 (07 Mar 2022 11:03)  T(F): 97.7 (08 Mar 2022 07:34), Max: 97.7 (07 Mar 2022 11:03)  HR: 81 (08 Mar 2022 07:34) (71 - 81)  BP: 115/64 (08 Mar 2022 07:34) (115/64 - 172/54)  RR: 18 (08 Mar 2022 07:34) (17 - 18)  SpO2: 96% (08 Mar 2022 07:34) (96% - 100%)    Physical:  General: A&Ox3. NAD.  Abdomen: Soft nondistended, nontender. No guarding    I&O's Detail    07 Mar 2022 07:01  -  08 Mar 2022 07:00  --------------------------------------------------------  IN:    Oral Fluid: 225 mL  Total IN: 225 mL    OUT:    Voided (mL): 390 mL  Total OUT: 390 mL    Total NET: -165 mL    LABS:                        8.7    6.26  )-----------( 538      ( 08 Mar 2022 07:07 )             28.5             03-08    134<L>  |  101  |  27<H>  ----------------------------<  94  4.0   |  28  |  0.83    Ca    8.9      08 Mar 2022 07:07  Phos  4.1     03-07  Mg     2.1     03-07    TPro  6.3  /  Alb  2.6<L>  /  TBili  0.5  /  DBili  x   /  AST  14  /  ALT  12  /  AlkPhos  83  03-07   INTERVAL HPI/OVERNIGHT EVENTS:  Pt resting comfortably. No acute complaints.   Tolerating diet, soft/bite size. Had dinner last night. States she didn't eat breakfast yet because she does not like scrabbled eggs. Requesting change in meal  flatus/BM.   Denies N/V    MEDICATIONS  (STANDING):  acetaminophen     Tablet .. 650 milliGRAM(s) Oral every 6 hours  clopidogrel Tablet 75 milliGRAM(s) Oral daily  enoxaparin Injectable 40 milliGRAM(s) SubCutaneous every 24 hours  ferrous    sulfate 325 milliGRAM(s) Oral daily  furosemide    Tablet 40 milliGRAM(s) Oral daily  gabapentin 300 milliGRAM(s) Oral two times a day  sodium chloride 0.9% lock flush 3 milliLiter(s) IV Push every 8 hours    Vital Signs Last 24 Hrs  T(C): 36.5 (08 Mar 2022 07:34), Max: 36.5 (07 Mar 2022 11:03)  T(F): 97.7 (08 Mar 2022 07:34), Max: 97.7 (07 Mar 2022 11:03)  HR: 81 (08 Mar 2022 07:34) (71 - 81)  BP: 115/64 (08 Mar 2022 07:34) (115/64 - 172/54)  RR: 18 (08 Mar 2022 07:34) (17 - 18)  SpO2: 96% (08 Mar 2022 07:34) (96% - 100%)    Physical:  General: A&Ox3. NAD.  Abdomen: Soft nondistended, nontender. No guarding    I&O's Detail    07 Mar 2022 07:01  -  08 Mar 2022 07:00  --------------------------------------------------------  IN:    Oral Fluid: 225 mL  Total IN: 225 mL    OUT:    Voided (mL): 390 mL  Total OUT: 390 mL    Total NET: -165 mL    LABS:                        8.7    6.26  )-----------( 538      ( 08 Mar 2022 07:07 )             28.5             03-08    134<L>  |  101  |  27<H>  ----------------------------<  94  4.0   |  28  |  0.83    Ca    8.9      08 Mar 2022 07:07  Phos  4.1     03-07  Mg     2.1     03-07    TPro  6.3  /  Alb  2.6<L>  /  TBili  0.5  /  DBili  x   /  AST  14  /  ALT  12  /  AlkPhos  83  03-07

## 2022-03-08 NOTE — PROGRESS NOTE ADULT - ATTENDING COMMENTS
Patient seen and examined on 3/8/22. This is a late entry. In brief, this is a 88 yo F with HFpEF, hx of CVA, DM2 who presented initially with acute hypoxic respiratory failure 2/2 acute decompensated HFpEF now resolved. Noted to have anemia so CT A/P was obtained showing rectal wall thickening. Seen by GI and patient declines colonoscopy. DC planning.

## 2022-03-08 NOTE — PROGRESS NOTE ADULT - PROBLEM SELECTOR PLAN 1
p/w progressive SOB x1wk  ddx: PE vs new-onset CHF vs symptomatic anemia  physical exam: decreased breath sounds b/l, pitting edema b/l (R>L), positive rashard sign    CXR pulmonary congestion   s/p furosemide 80 in ED  off O2, saturating well on RA   c/w 40mg IV lasix BID, transitioned to Lasix IV QD for up trending SCr ->(PO lasix today 3/8)  telemonitoring  dimer 483, LE doppler neg   Cardio, Dr. Tovar, consulted

## 2022-03-08 NOTE — PROGRESS NOTE ADULT - ASSESSMENT
88 y/o Female with resolved SBO  Repeat CT w/o bowel obstruction    -diet as tolerated  -no further surgical intervention warranted at this time  -remainder of care per primary team

## 2022-03-08 NOTE — PROGRESS NOTE ADULT - PROBLEM SELECTOR PLAN 1
p/w progressive SOB x1wk  ddx: PE vs new-onset CHF vs symptomatic anemia  physical exam: decreased breath sounds b/l, pitting edema b/l (R>L), positive rashard sign    CXR pulmonary congestion   s/p furosemide 80 in ED  off O2, saturating well on RA   c/w 40mg IV lasix BID, transitioned to Lasix IV QD for up trending SCr ->(PO lasix today 3/8)  telemonitoring  dimer 483, LE doppler neg

## 2022-03-08 NOTE — PROGRESS NOTE ADULT - PROBLEM SELECTOR PLAN 3
Hgb 7.8 (unknown baseline)  held empiric AC given anemia, PE r/o   Iron low and ferritin low   started on ferrous sulfate   CT abdomen/pelvis: Possible mural thickening of the rectum may be due to underdistention, proctitis, or rectal cancer.  GI Mya consulted

## 2022-03-08 NOTE — CONSULT NOTE ADULT - ATTENDING COMMENTS
- Anemia.  - Rectal thickening on CT.    Patient seen and examined. In short, pt is an 89F p/w SOB a/w CHF exacerbation, GI consulted for rectal thickening seen on CT. Patient reports feeling well, no GI complaints. Having normal BMs, no melena or hematochezia. No abd pain. Abd soft, NTND. States wishes to go home. Discussed potential colonoscopy to evaluate rectal thickening rule out mass or inflammation however given her age and comorbidities, decided not to pursue at this time. Can f/u with outpatient GI to rediscuss. No plans for inpatient colonoscopy. F/u as an outpaitent.

## 2022-03-08 NOTE — PROGRESS NOTE ADULT - SUBJECTIVE AND OBJECTIVE BOX
PGY-1 Progress Note discussed with attending    PAGER #: [[phone]] TILL 5:00 PM  PLEASE CONTACT ON CALL TEAM:  - On Call Team (Please refer to Joe) FROM 5:00 PM - 8:30PM  - Nightfloat Team FROM 8:30 -7:30 AM    CHIEF COMPLAINT & BRIEF HOSPITAL COURSE:    INTERVAL HPI/OVERNIGHT EVENTS:       REVIEW OF SYSTEMS:  CONSTITUTIONAL: No fever, night sweats, weight loss, or fatigue  RESPIRATORY: No cough, wheezing, or hemoptysis; No shortness of breath  CARDIOVASCULAR: No chest pain, palpitations, dizziness, or leg swelling  GASTROINTESTINAL: No abdominal pain. No nausea, vomiting, or hematemesis; No diarrhea or constipation. No melena or hematochezia.  GENITOURINARY: No dysuria or hematuria, no urinary frequency  NEUROLOGICAL: No headaches, memory loss, loss of strength, numbness, or tremors  SKIN: No itching, burning, rashes, or lesions     Vital Signs Last 24 Hrs  T(C): 36.5 (08 Mar 2022 07:34), Max: 36.5 (07 Mar 2022 11:03)  T(F): 97.7 (08 Mar 2022 07:34), Max: 97.7 (07 Mar 2022 11:03)  HR: 81 (08 Mar 2022 07:34) (71 - 81)  BP: 115/64 (08 Mar 2022 07:34) (115/64 - 172/54)  BP(mean): --  RR: 18 (08 Mar 2022 07:34) (17 - 18)  SpO2: 96% (08 Mar 2022 07:34) (96% - 100%)    PHYSICAL EXAMINATION:  GENERAL: NAD, well built  HEAD:  Atraumatic, Normocephalic  EYES:  conjunctiva and sclera clear  NECK: Supple, No JVD, thyroid not examined   CHEST/LUNG: Clear to auscultation. No wheezing, rales, rubs or rhonchi  HEART: Regular rate and rhythm; Clear S1 and S2, No murmurs, rubs, or gallops  ABDOMEN: Soft, Nontender, Nondistended; Bowel sounds present  NERVOUS SYSTEM:  Alert & Oriented X3, CNII-XII intact, no focal neurological deficits   EXTREMITIES:  2+ Peripheral Pulses, No clubbing, cyanosis, or edema  SKIN: warm dry, no lesions noted                           8.7    6.26  )-----------( 538      ( 08 Mar 2022 07:07 )             28.5     03-08    134<L>  |  101  |  27<H>  ----------------------------<  94  4.0   |  28  |  0.83    Ca    8.9      08 Mar 2022 07:07  Phos  4.1     03-07  Mg     2.1     03-07    TPro  6.3  /  Alb  2.6<L>  /  TBili  0.5  /  DBili  x   /  AST  14  /  ALT  12  /  AlkPhos  83  03-07    LIVER FUNCTIONS - ( 07 Mar 2022 07:07 )  Alb: 2.6 g/dL / Pro: 6.3 g/dL / ALK PHOS: 83 U/L / ALT: 12 U/L DA / AST: 14 U/L / GGT: x                   CAPILLARY BLOOD GLUCOSE    MEDICATIONS  (STANDING):  acetaminophen     Tablet .. 650 milliGRAM(s) Oral every 6 hours  clopidogrel Tablet 75 milliGRAM(s) Oral daily  enoxaparin Injectable 40 milliGRAM(s) SubCutaneous every 24 hours  ferrous    sulfate 325 milliGRAM(s) Oral daily  furosemide    Tablet 40 milliGRAM(s) Oral daily  gabapentin 300 milliGRAM(s) Oral two times a day  sodium chloride 0.9% lock flush 3 milliLiter(s) IV Push every 8 hours    MEDICATIONS  (PRN):      RADIOLOGY & ADDITIONAL TESTS:               PGY-1 Progress Note discussed with attending    PAGER #: [[phone]] TILL 5:00 PM  PLEASE CONTACT ON CALL TEAM:  - On Call Team (Please refer to Joe) FROM 5:00 PM - 8:30PM  - Nightfloat Team FROM 8:30 -7:30 AM    CHIEF COMPLAINT & BRIEF HOSPITAL COURSE:  Patient is a 89yoF, AAOx3 and ambulates w/ walker, w/ PMH of CVA, HTN, DM, p/w SOB for a week. Admitted for acute hypoxic respiratory failure requiring oxygen supplementation 2/2 acute diastolic congestive heart failure.     INTERVAL HPI/OVERNIGHT EVENTS:   Patient seen and examined at bedside. No overnight events. No acute complaints this AM. Pending GI eval for CT finding.      REVIEW OF SYSTEMS:  CONSTITUTIONAL: No fever, night sweats, weight loss, or fatigue  RESPIRATORY: No cough, wheezing, or hemoptysis; No shortness of breath  CARDIOVASCULAR: No chest pain, palpitations, dizziness, or leg swelling  GASTROINTESTINAL: No abdominal pain. No nausea, vomiting, or hematemesis; No diarrhea or constipation. No melena or hematochezia.  GENITOURINARY: No dysuria or hematuria, no urinary frequency  NEUROLOGICAL: No headaches, memory loss, loss of strength, numbness, or tremors  SKIN: No itching, burning, rashes, or lesions     Vital Signs Last 24 Hrs  T(C): 36.5 (08 Mar 2022 07:34), Max: 36.5 (07 Mar 2022 11:03)  T(F): 97.7 (08 Mar 2022 07:34), Max: 97.7 (07 Mar 2022 11:03)  HR: 81 (08 Mar 2022 07:34) (71 - 81)  BP: 115/64 (08 Mar 2022 07:34) (115/64 - 172/54)  BP(mean): --  RR: 18 (08 Mar 2022 07:34) (17 - 18)  SpO2: 96% (08 Mar 2022 07:34) (96% - 100%)    PHYSICAL EXAMINATION:  GENERAL: NAD, well built  HEAD:  Atraumatic, Normocephalic  EYES:  conjunctiva and sclera clear  NECK: Supple, No JVD, thyroid not examined   CHEST/LUNG: Clear to auscultation. No wheezing, rales, rubs or rhonchi  HEART: Regular rate and rhythm; Clear S1 and S2, No murmurs, rubs, or gallops  ABDOMEN: Soft, Nontender, Nondistended; Bowel sounds present  NERVOUS SYSTEM:  Alert & Oriented X3, CNII-XII intact, no focal neurological deficits   EXTREMITIES:  2+ Peripheral Pulses, No clubbing, cyanosis, or edema  SKIN: warm dry, no lesions noted                         8.7    6.26  )-----------( 538      ( 08 Mar 2022 07:07 )             28.5     03-08    134<L>  |  101  |  27<H>  ----------------------------<  94  4.0   |  28  |  0.83    Ca    8.9      08 Mar 2022 07:07  Phos  4.1     03-07  Mg     2.1     03-07    TPro  6.3  /  Alb  2.6<L>  /  TBili  0.5  /  DBili  x   /  AST  14  /  ALT  12  /  AlkPhos  83  03-07    LIVER FUNCTIONS - ( 07 Mar 2022 07:07 )  Alb: 2.6 g/dL / Pro: 6.3 g/dL / ALK PHOS: 83 U/L / ALT: 12 U/L DA / AST: 14 U/L / GGT: x                CAPILLARY BLOOD GLUCOSE    MEDICATIONS  (STANDING):  acetaminophen     Tablet .. 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    --- End of Report ---    *** END OF ADDENDUM***      PROCEDURE DATE:  03/06/2022          INTERPRETATION:  CLINICAL INFORMATION: Low-grade small bowel obstruction    COMPARISON: 3/5/2022    CONTRAST/COMPLICATIONS:  IV Contrast: NONE  Oral Contrast: Omnipaque 300  Complications: None reported at time of study completion    PROCEDURE:  CT of the Abdomen and Pelvis was performed.  Sagittal and coronal reformats were performed.    FINDINGS:  LOWER CHEST: Mild bilateral pleural effusions, grossly unchanged on the   right and slightly decreased on the left. Mild bilateral atelectasis.    LIVER: Within normal limits.  BILE DUCTS: Normal caliber.  GALLBLADDER: Cholelithiasis. No evidence for thickened gallbladder wall   or pericholecystic fluid.  SPLEEN: Within normal limits.  PANCREAS: Within normal limits.  ADRENALS: Within normal limits.  KIDNEYS/URETERS: Stable hypodense lesions in the right kidney,   representing probable cysts. No evidence for a calculus in the kidneys or   ureters. No hydronephrosis.    BLADDER: Stable small air in the urinary bladder may be due to prior   Fernando catheter placement or cystitis. Clinical correlation is recommended.  REPRODUCTIVE ORGANS: Mild fullness of the posterior lower uterine segment   may be due to a fibroid. The adnexa appear grossly unremarkable.    BOWEL: No bowel obstruction. Colonic diverticulosis without evidence of   diverticulitis. Appendix not visualized. No secondary signs for acute   appendicitis. Possible mural thickening of the rectum may be due to   underdistention, proctitis, or rectal cancer. Clinical correlation is   recommended.  PERITONEUM: No free air or ascites.  VESSELS: Calcified atherosclerotic disease.  RETROPERITONEUM/LYMPH NODES: No lymphadenopathy.  ABDOMINAL WALL: Stable fat-containing ventral hernia.  BONES: Degenerative spondylosis. Grade 1 anterolisthesis at L4-5.    IMPRESSION:  No bowel obstruction. Colonic diverticulosis without evidence of   diverticulitis. Appendix not visualized. No secondary signs for acute   appendicitis. Possible mural thickening of the rectum may be due to   underdistention, proctitis, or rectal cancer. Clinical correlation is   recommended.    Stable small air in the urinary bladder may be due to prior Fernando   catheter placement or cystitis. Clinical correlation is recommended.    Mild bilateral pleural effusions.    A preliminary report was provided by DYLLAN NATION MD    --- End of Report ---    ***Please see the addendum at the top of this report. It may contain   additional important information or changes.****        MARIPOSA PATINO MD; Attending Radiologist  This document has been electronically signed. Mar  7 2022 10:51AM  Addend:MARIPOSA PATINO MD; Attending Radiologist  This addendum was electronically signed on: Mar  7 2022 11:55AM.

## 2022-03-08 NOTE — PROGRESS NOTE ADULT - NSPROGADDITIONALINFOA_GEN_ALL_CORE
Admitted with acute appendicitis. Has abdominal tenderness and appendicitis in the cat scan. Had a long d/w the pt. All the options, alternatives, benefits and risks of the surgery were discussed. The potential complications were disscussed. Conversion to open surgery, bleeding, bowel injury, bladder injury and other related complications were discussed. All the questions were answered to their satisfaction.  Informed consent obtained Pt with PSBO  will follow  NPO

## 2022-03-08 NOTE — PROGRESS NOTE ADULT - PROBLEM SELECTOR PLAN 4
RESOLVED, s/p Lasix 80 IV in ED and Lasix 40 IV BID, now on Lasix IV QD  p/w progressive SOB x1wk  ddx: PE vs new-onset CHF vs symptomatic anemia  Physical Exam: decreased breath sounds b/l, pitting edema b/l (R>L), positive rashard sign    CXR pulmonary congestion   telemonitoring  dimer 483, LE doppler neg   TTE: EF 58%, MR, AR GIIDD, increased RV systolic, LEFT pleural effusion   PE ruled out  due to improvement from diuresis, no need for AC   Cardio, Dr. Tovar, consulted

## 2022-03-08 NOTE — PROGRESS NOTE ADULT - TIME BILLING
Patient was seen and examined by me  on 3/8/2022 ,interim events noted,Laboratory and Imaging studies were reviewed.  Thank you for the courtesy of the consultation,I would be available for any further discussion if needed.  Javon Tovar MD,FACC.  [address] [place] [address] [phone]
Patient was seen and examined by me  on 3/7/2022 ,interim events noted,Laboratory and Imaging studies were reviewed.  Thank you for the courtesy of the consultation,I would be available for any further discussion if needed.  Javon Tovar MD,FACC.  [address] [place] [address] [phone]
Patient was seen and examined by me  on 3/6/2022 ,interim events noted,Laboratory and Imaging studies were reviewed.  Thank you for the courtesy of the consultation,I would be available for any further discussion if needed.  Javon Tovar MD,FACC.  [address] [place] [address] [phone]
Patient was seen and examined by me  on 3/5/2022 ,interim events noted,Laboratory and Imaging studies were reviewed.  Thank you for the courtesy of the consultation,I would be available for any further discussion if needed.  Javon Tovar MD,FACC.  [address] [place] [address] [phone]
Patient was seen and examined by me  on 3/4/2022 ,interim events noted,Laboratory and Imaging studies were reviewed.  Thank you for the courtesy of the consultation,I would be available for any further discussion if needed.  Javon Tovar MD,FACC.  [address] [place] [address] [phone]

## 2022-03-08 NOTE — CONSULT NOTE ADULT - SUBJECTIVE AND OBJECTIVE BOX
INITIAL GI CONSULTATION    Patient is a 89y old  Female who presents with a chief complaint of acute hypoxic respiratory failure (08 Mar 2022 10:39)    HPI:  Patient is a 89yoF, AAOx3 and ambulates w/ walker, w/ PMH of CVA, HTN, DM, p/w SOB for a week. She reports progressive SOB. She states she was unable to lay flat due to difficulty of breathing, and she is unable to walk due to SOB. She also endorses recent swelling of her leg bilaterally (R>L). She states no trigger events, which includes no recent sickness. She denies fever, chills, n/v, chest pain, abdominal pain, urinary or bowel movement changes. (03 Mar 2022 03:38)    GI HPI:  Pt is a 89 year old female with PMHx of CVA (on plavix), HTN, DM admitted with pulmonary edema and progressive SOB. GI consulted for possible mural thickening of the rectum from CT scan. Pt is very hard of hearing. Pt denies abdominal pain, nausea, vomiting, diarrhea, melena, hematochezia, change in bowel or any other issues. Pt denies Hx of EGD or colonoscopy in the past. Reports having regular bowel movement daily. Pt has iron deficiency and stable with Hg ~8.           PMH/PSH:  PAST MEDICAL & SURGICAL HISTORY:  Diabetes  History of hypertension  Stroke  High cholesterol      FH:  FAMILY HISTORY: Denies family Hx of gastric or colon CA    Social:  Denies Hx of ETOH or smoking       MEDS:  MEDICATIONS  (STANDING):  acetaminophen     Tablet .. 650 milliGRAM(s) Oral every 6 hours  amLODIPine   Tablet 5 milliGRAM(s) Oral daily  clopidogrel Tablet 75 milliGRAM(s) Oral daily  enoxaparin Injectable 40 milliGRAM(s) SubCutaneous every 24 hours  ferrous    sulfate 325 milliGRAM(s) Oral daily  furosemide    Tablet 40 milliGRAM(s) Oral daily  gabapentin 300 milliGRAM(s) Oral two times a day  simvastatin 10 milliGRAM(s) Oral at bedtime  sodium chloride 0.9% lock flush 3 milliLiter(s) IV Push every 8 hours    MEDICATIONS  (PRN):    Allergies    No Known Allergies    Intolerances            CONSTITUTIONAL:  No weight loss, fever, chills, weakness or fatigue.  HEENT:  Eyes:  No visual loss, blurred vision, double vision or yellow sclerae. Ears, Nose, Throat:  No hearing loss, sneezing, congestion, runny nose or sore throat.  SKIN:  No rash or itching.  CARDIOVASCULAR:  No chest pain, chest pressure or chest discomfort. No palpitations or edema.  RESPIRATORY:  No shortness of breath, cough or sputum.  GASTROINTESTINAL:  SEE HPI  GENITOURINARY:  No dysuria, hematuria, urinary frequency  NEUROLOGICAL:  No headache, dizziness, syncope, paralysis, ataxia, numbness or tingling in the extremities. No change in bowel or bladder control.  MUSCULOSKELETAL:  No muscle, back pain, joint pain or stiffness.  HEMATOLOGIC:  No anemia, bleeding or bruising.  LYMPHATICS:  No enlarged nodes. No history of splenectomy.  PSYCHIATRIC:  No history of depression or anxiety.  ENDOCRINOLOGIC:  No reports of sweating, cold or heat intolerance. No polyuria or polydipsia.      ______________________________________________________________________  PHYSICAL EXAM:  T(C): 36.4 (03-08-22 @ 20:55), Max: 36.6 (03-08-22 @ 11:32)  HR: 57 (03-08-22 @ 20:55)  BP: 121/37 (03-08-22 @ 20:55)  RR: 16 (03-08-22 @ 20:55)  SpO2: 100% (03-08-22 @ 20:55)  Wt(kg): --    03-07 - 03-08  --------------------------------------------------------  IN:    Oral Fluid: 225 mL  Total IN: 225 mL    OUT:    Voided (mL): 390 mL  Total OUT: 390 mL    Total NET: -165 mL      03-08 - 03-08  --------------------------------------------------------  IN:  Total IN: 0 mL    OUT:    Voided (mL): 500 mL  Total OUT: 500 mL    Total NET: -500 mL          GEN: NAD, normocephalic  CVS: S1S2+  ABD: soft, nontender, nondistended, bowel sounds present  EXTR: no cyanosis, no clubbing, + edema (RLE > LLE)   NEURO: Awake and alert; oriented x3  SKIN:  warm;  non icteric    ______________________________________________________________________  LABS:                        8.7    6.26  )-----------( 538      ( 08 Mar 2022 07:07 )             28.5     03-08    134<L>  |  101  |  27<H>  ----------------------------<  94  4.0   |  28  |  0.83    Ca    8.9      08 Mar 2022 07:07  Phos  4.1     03-07  Mg     2.1     03-07    TPro  6.3  /  Alb  2.6<L>  /  TBili  0.5  /  DBili  x   /  AST  14  /  ALT  12  /  AlkPhos  83  03-07    LIVER FUNCTIONS - ( 07 Mar 2022 07:07 )  Alb: 2.6 g/dL / Pro: 6.3 g/dL / ALK PHOS: 83 U/L / ALT: 12 U/L DA / AST: 14 U/L / GGT: x             ____________________________________________    IMAGING:  < from: CT Abdomen and Pelvis w/ Oral Cont (03.06.22 @ 21:21) >    ACC: [id number] EXAM:  CT ABDOMEN AND PELVIS OC                          *** ADDENDUM***    Upon further review, there 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    --- End of Report ---    *** END OF ADDENDUM***      PROCEDURE DATE:  03/06/2022          INTERPRETATION:  CLINICAL INFORMATION: Low-grade small bowel obstruction    COMPARISON: 3/5/2022    CONTRAST/COMPLICATIONS:  IV Contrast: NONE  Oral Contrast: Omnipaque 300  Complications: None reported at time of study completion    PROCEDURE:  CT of the Abdomen and Pelvis was performed.  Sagittal and coronal reformats were performed.    FINDINGS:  LOWER CHEST: Mild bilateral pleural effusions, grossly unchanged on the   right and slightly decreased on the left. Mild bilateral atelectasis.    LIVER: Within normal limits.  BILE DUCTS: Normal caliber.  GALLBLADDER: Cholelithiasis. No evidence for thickened gallbladder wall   or pericholecystic fluid.  SPLEEN: Within normal limits.  PANCREAS: Within normal limits.  ADRENALS: Within normal limits.  KIDNEYS/URETERS: Stable hypodense lesions in the right kidney,   representing probable cysts. No evidence for a calculus in the kidneys or   ureters. No hydronephrosis.    BLADDER: Stable small air in the urinary bladder may be due to prior   Fernando catheter placement or cystitis. Clinical correlation is recommended.  REPRODUCTIVE ORGANS: Mild fullness of the posterior lower uterine segment   may be due to a fibroid. The adnexa appear grossly unremarkable.    BOWEL: No bowel obstruction. Colonic diverticulosis without evidence of   diverticulitis. Appendix not visualized. No secondary signs for acute   appendicitis. Possible mural thickening of the rectum may be due to   underdistention, proctitis, or rectal cancer. Clinical correlation is   recommended.  PERITONEUM: No free air or ascites.  VESSELS: Calcified atherosclerotic disease.  RETROPERITONEUM/LYMPH NODES: No lymphadenopathy.  ABDOMINAL WALL: Stable fat-containing ventral hernia.  BONES: Degenerative spondylosis. Grade 1 anterolisthesis at L4-5.    IMPRESSION:  No bowel obstruction. Colonic diverticulosis without evidence of   diverticulitis. Appendix not visualized. No secondary signs for acute   appendicitis. Possible mural thickening of the rectum may be due to   underdistention, proctitis, or rectal cancer. Clinical correlation is   recommended.    Stable small air in the urinary bladder may be due to prior Fernando   catheter placement or cystitis. Clinical correlation is recommended.    Mild bilateral pleural effusions.    A preliminary report was provided by DYLLAN NATION MD    --- End of Report ---    ***Please see the addendum at the top of this report. It may contain   additional important information or changes.****  -----------------------------------------------------------------------  < from: CT Abdomen and Pelvis No Cont (03.05.22 @ 23:55) >    ACC: [id number] EXAM:  CT ABDOMEN AND PELVIS                          PROCEDURE DATE:  03/05/2022      INTERPRETATION:  CLINICAL INFORMATION: Toxic respiratory failure.  Iron deficiency anemia. Thyroid for occult malignancy.    COMPARISON: None.    CONTRAST/COMPLICATIONS:  IV Contrast: NONE  Oral Contrast: NONE  Complications: None reported at time of study completion    PROCEDURE:  CT of the Abdomen and Pelvis was performed.  Sagittal and coronal reformats were performed.    FINDINGS:    LOWER CHEST: The heart is enlarged. Mitral valvular calcification.  Bilateral pleural effusions with underlying partial atelectasis bibasilar   lower lobes.  Intralobular septal thickening right lower lobe.    Evaluation of the solid organ parenchyma is limited without intravenous   contrast.    LIVER: Within normal limits.  BILE DUCTS: Normal caliber.  GALLBLADDER: Gallstones and/or biliary sludge, extending toward the neck   of the gallbladder.  SPLEEN: Within normal limits.  PANCREAS: Within normal limits.  ADRENALS: Within normal limits.  KIDNEYS/URETERS:  Right renal cysts, the largest measuring approximate 5 cm upper pole.  No hydronephrosis.    BLADDER: Nondependent air within the bladder, correlate clinically for   recent instrumentation versus bladder infection.  REPRODUCTIVE ORGANS:  Retroverted uterus, bulky appearance may reflect large intramural fibroid.    BOWEL:  There is mildly dilated, fluid-filled bowel loops with possible   transition zone in mid abdomen. Distal bowel loopsare nondistended.  Asymmetric filling defect distal rectum, could reflect dense stool.  PERITONEUM: No ascites.    VESSELS: Atherosclerotic changes.  RETROPERITONEUM/LYMPH NODES: No lymphadenopathy.    ABDOMINAL WALL: Fat-containing ventral abdominalwall hernia.  This is adjacent to a possible postoperative changes in the right lower   abdominal wall.    BONES: Degenerative changes spine.  Anterolisthesis L4 and L5.    IMPRESSION:    Mildly dilated fluid-filled small bowel loops with possible transition   zone in mid abdomen to normal caliber distal small bowel; findings may   reflect low-grade small bowel obstruction.  Consider follow-up exam with enteric contrast.    Asymmetric filling defect distal rectum could reflect dense stool.  Recommend further clinical correlation to exclude underlying intrinsic   mucosal lesion or neoplasm.    Bilateral pleural effusions and partial lower lobe atelectasis.    Other findings as discussed above

## 2022-03-08 NOTE — CONSULT NOTE ADULT - ASSESSMENT
89 y/o F (AAOx3 and ambulates w/ walker) w/ PMH of CVA, HTN, DM, p/w SOB for a week associated with leg swelling, admitted for acute hypoxic respiratory failure requiring oxygen supplementation
89 yo F with possible low grade small bowel obstruction seen on CT scan. Pt asymptomatic. Having bowel function  1. Recommend CT scan abdomen with oral contrast  2. Cont NPO  3. Will follow  4. D/w Dr. Kelly
 Iron deficiency anemia.   ·  Plan: Hgb 7.8 (unknown baseline)  held empiric AC given anemia, PE r/o   Iron low and ferritin low   started on ferrous sulfate   CT abdomen/pelvis: Possible mural thickening of the rectum may be due to underdistention, proctitis, or rectal cancer.  GI Mya consulted.

## 2022-03-09 VITALS
SYSTOLIC BLOOD PRESSURE: 120 MMHG | RESPIRATION RATE: 18 BRPM | HEART RATE: 70 BPM | TEMPERATURE: 98 F | DIASTOLIC BLOOD PRESSURE: 41 MMHG | OXYGEN SATURATION: 100 %

## 2022-03-09 LAB
ANION GAP SERPL CALC-SCNC: 5 MMOL/L — SIGNIFICANT CHANGE UP (ref 5–17)
BUN SERPL-MCNC: 26 MG/DL — HIGH (ref 7–18)
CALCIUM SERPL-MCNC: 9 MG/DL — SIGNIFICANT CHANGE UP (ref 8.4–10.5)
CHLORIDE SERPL-SCNC: 101 MMOL/L — SIGNIFICANT CHANGE UP (ref 96–108)
CO2 SERPL-SCNC: 28 MMOL/L — SIGNIFICANT CHANGE UP (ref 22–31)
CREAT SERPL-MCNC: 0.87 MG/DL — SIGNIFICANT CHANGE UP (ref 0.5–1.3)
EGFR: 64 ML/MIN/1.73M2 — SIGNIFICANT CHANGE UP
GLUCOSE SERPL-MCNC: 89 MG/DL — SIGNIFICANT CHANGE UP (ref 70–99)
HCT VFR BLD CALC: 28.8 % — LOW (ref 34.5–45)
HGB BLD-MCNC: 8.9 G/DL — LOW (ref 11.5–15.5)
MAGNESIUM SERPL-MCNC: 2.2 MG/DL — SIGNIFICANT CHANGE UP (ref 1.6–2.6)
MCHC RBC-ENTMCNC: 28 PG — SIGNIFICANT CHANGE UP (ref 27–34)
MCHC RBC-ENTMCNC: 30.9 GM/DL — LOW (ref 32–36)
MCV RBC AUTO: 90.6 FL — SIGNIFICANT CHANGE UP (ref 80–100)
NRBC # BLD: 0 /100 WBCS — SIGNIFICANT CHANGE UP (ref 0–0)
PHOSPHATE SERPL-MCNC: 4 MG/DL — SIGNIFICANT CHANGE UP (ref 2.5–4.5)
PLATELET # BLD AUTO: 560 K/UL — HIGH (ref 150–400)
POTASSIUM SERPL-MCNC: 4.7 MMOL/L — SIGNIFICANT CHANGE UP (ref 3.5–5.3)
POTASSIUM SERPL-SCNC: 4.7 MMOL/L — SIGNIFICANT CHANGE UP (ref 3.5–5.3)
RBC # BLD: 3.18 M/UL — LOW (ref 3.8–5.2)
RBC # FLD: 13.6 % — SIGNIFICANT CHANGE UP (ref 10.3–14.5)
SARS-COV-2 RNA SPEC QL NAA+PROBE: SIGNIFICANT CHANGE UP
SODIUM SERPL-SCNC: 134 MMOL/L — LOW (ref 135–145)
WBC # BLD: 8.28 K/UL — SIGNIFICANT CHANGE UP (ref 3.8–10.5)
WBC # FLD AUTO: 8.28 K/UL — SIGNIFICANT CHANGE UP (ref 3.8–10.5)

## 2022-03-09 PROCEDURE — 82962 GLUCOSE BLOOD TEST: CPT

## 2022-03-09 PROCEDURE — 36415 COLL VENOUS BLD VENIPUNCTURE: CPT

## 2022-03-09 PROCEDURE — 85610 PROTHROMBIN TIME: CPT

## 2022-03-09 PROCEDURE — 71045 X-RAY EXAM CHEST 1 VIEW: CPT

## 2022-03-09 PROCEDURE — 85379 FIBRIN DEGRADATION QUANT: CPT

## 2022-03-09 PROCEDURE — 83615 LACTATE (LD) (LDH) ENZYME: CPT

## 2022-03-09 PROCEDURE — 84484 ASSAY OF TROPONIN QUANT: CPT

## 2022-03-09 PROCEDURE — 74176 CT ABD & PELVIS W/O CONTRAST: CPT

## 2022-03-09 PROCEDURE — 99239 HOSP IP/OBS DSCHRG MGMT >30: CPT | Mod: GC

## 2022-03-09 PROCEDURE — 99285 EMERGENCY DEPT VISIT HI MDM: CPT | Mod: 25

## 2022-03-09 PROCEDURE — 86850 RBC ANTIBODY SCREEN: CPT

## 2022-03-09 PROCEDURE — 84100 ASSAY OF PHOSPHORUS: CPT

## 2022-03-09 PROCEDURE — 85045 AUTOMATED RETICULOCYTE COUNT: CPT

## 2022-03-09 PROCEDURE — 86900 BLOOD TYPING SEROLOGIC ABO: CPT

## 2022-03-09 PROCEDURE — 93970 EXTREMITY STUDY: CPT

## 2022-03-09 PROCEDURE — 87635 SARS-COV-2 COVID-19 AMP PRB: CPT

## 2022-03-09 PROCEDURE — 83036 HEMOGLOBIN GLYCOSYLATED A1C: CPT

## 2022-03-09 PROCEDURE — 80053 COMPREHEN METABOLIC PANEL: CPT

## 2022-03-09 PROCEDURE — 83540 ASSAY OF IRON: CPT

## 2022-03-09 PROCEDURE — 83735 ASSAY OF MAGNESIUM: CPT

## 2022-03-09 PROCEDURE — 83550 IRON BINDING TEST: CPT

## 2022-03-09 PROCEDURE — 82728 ASSAY OF FERRITIN: CPT

## 2022-03-09 PROCEDURE — 85027 COMPLETE CBC AUTOMATED: CPT

## 2022-03-09 PROCEDURE — 97530 THERAPEUTIC ACTIVITIES: CPT

## 2022-03-09 PROCEDURE — 80061 LIPID PANEL: CPT

## 2022-03-09 PROCEDURE — 85025 COMPLETE CBC W/AUTO DIFF WBC: CPT

## 2022-03-09 PROCEDURE — 93306 TTE W/DOPPLER COMPLETE: CPT

## 2022-03-09 PROCEDURE — 80048 BASIC METABOLIC PNL TOTAL CA: CPT

## 2022-03-09 PROCEDURE — 83880 ASSAY OF NATRIURETIC PEPTIDE: CPT

## 2022-03-09 PROCEDURE — 74018 RADEX ABDOMEN 1 VIEW: CPT

## 2022-03-09 PROCEDURE — 86901 BLOOD TYPING SEROLOGIC RH(D): CPT

## 2022-03-09 PROCEDURE — 85730 THROMBOPLASTIN TIME PARTIAL: CPT

## 2022-03-09 PROCEDURE — 93005 ELECTROCARDIOGRAM TRACING: CPT

## 2022-03-09 RX ORDER — SIMVASTATIN 20 MG/1
1 TABLET, FILM COATED ORAL
Qty: 30 | Refills: 0
Start: 2022-03-09 | End: 2022-04-07

## 2022-03-09 RX ORDER — LISINOPRIL 2.5 MG/1
20 TABLET ORAL DAILY
Refills: 0 | Status: DISCONTINUED | OUTPATIENT
Start: 2022-03-09 | End: 2022-03-09

## 2022-03-09 RX ORDER — LISINOPRIL 2.5 MG/1
0 TABLET ORAL
Qty: 0 | Refills: 0 | DISCHARGE

## 2022-03-09 RX ORDER — AMLODIPINE BESYLATE 2.5 MG/1
1 TABLET ORAL
Qty: 0 | Refills: 0 | DISCHARGE
Start: 2022-03-09

## 2022-03-09 RX ORDER — SIMVASTATIN 20 MG/1
1 TABLET, FILM COATED ORAL
Qty: 0 | Refills: 0 | DISCHARGE
Start: 2022-03-09

## 2022-03-09 RX ORDER — LISINOPRIL 2.5 MG/1
1 TABLET ORAL
Qty: 0 | Refills: 0 | DISCHARGE
Start: 2022-03-09

## 2022-03-09 RX ORDER — CLOPIDOGREL BISULFATE 75 MG/1
1 TABLET, FILM COATED ORAL
Qty: 30 | Refills: 0
Start: 2022-03-09 | End: 2022-04-07

## 2022-03-09 RX ORDER — METFORMIN HYDROCHLORIDE 850 MG/1
1 TABLET ORAL
Qty: 60 | Refills: 0
Start: 2022-03-09 | End: 2022-04-07

## 2022-03-09 RX ORDER — FERROUS SULFATE 325(65) MG
1 TABLET ORAL
Qty: 30 | Refills: 0
Start: 2022-03-09 | End: 2022-04-07

## 2022-03-09 RX ORDER — LISINOPRIL 2.5 MG/1
1 TABLET ORAL
Qty: 30 | Refills: 0
Start: 2022-03-09 | End: 2022-04-07

## 2022-03-09 RX ORDER — CLOPIDOGREL BISULFATE 75 MG/1
1 TABLET, FILM COATED ORAL
Qty: 0 | Refills: 0 | DISCHARGE
Start: 2022-03-09

## 2022-03-09 RX ORDER — FUROSEMIDE 40 MG
1 TABLET ORAL
Qty: 30 | Refills: 0
Start: 2022-03-09 | End: 2022-04-07

## 2022-03-09 RX ORDER — AMLODIPINE BESYLATE 2.5 MG/1
0 TABLET ORAL
Qty: 0 | Refills: 0 | DISCHARGE

## 2022-03-09 RX ORDER — AMLODIPINE BESYLATE 2.5 MG/1
1 TABLET ORAL
Qty: 30 | Refills: 0
Start: 2022-03-09 | End: 2022-04-07

## 2022-03-09 RX ORDER — SIMVASTATIN 20 MG/1
0 TABLET, FILM COATED ORAL
Qty: 0 | Refills: 0 | DISCHARGE

## 2022-03-09 RX ADMIN — Medication 650 MILLIGRAM(S): at 00:48

## 2022-03-09 RX ADMIN — Medication 650 MILLIGRAM(S): at 17:48

## 2022-03-09 RX ADMIN — Medication 650 MILLIGRAM(S): at 13:37

## 2022-03-09 RX ADMIN — GABAPENTIN 300 MILLIGRAM(S): 400 CAPSULE ORAL at 06:17

## 2022-03-09 RX ADMIN — GABAPENTIN 300 MILLIGRAM(S): 400 CAPSULE ORAL at 17:48

## 2022-03-09 RX ADMIN — CLOPIDOGREL BISULFATE 75 MILLIGRAM(S): 75 TABLET, FILM COATED ORAL at 12:50

## 2022-03-09 RX ADMIN — Medication 650 MILLIGRAM(S): at 07:01

## 2022-03-09 RX ADMIN — Medication 325 MILLIGRAM(S): at 12:51

## 2022-03-09 RX ADMIN — SODIUM CHLORIDE 3 MILLILITER(S): 9 INJECTION INTRAMUSCULAR; INTRAVENOUS; SUBCUTANEOUS at 06:14

## 2022-03-09 RX ADMIN — Medication 40 MILLIGRAM(S): at 06:15

## 2022-03-09 RX ADMIN — Medication 650 MILLIGRAM(S): at 06:15

## 2022-03-09 RX ADMIN — AMLODIPINE BESYLATE 5 MILLIGRAM(S): 2.5 TABLET ORAL at 06:14

## 2022-03-09 RX ADMIN — SODIUM CHLORIDE 3 MILLILITER(S): 9 INJECTION INTRAMUSCULAR; INTRAVENOUS; SUBCUTANEOUS at 13:44

## 2022-03-09 NOTE — DISCHARGE NOTE PROVIDER - NSDCMRMEDTOKEN_GEN_ALL_CORE_FT
amLODIPine 5 mg oral tablet: 1 tab(s) orally once a day  clopidogrel 75 mg oral tablet: 1 tab(s) orally once a day  ferrous sulfate 325 mg (65 mg elemental iron) oral tablet: 1 tab(s) orally once a day  furosemide 40 mg oral tablet: 1 tab(s) orally once a day  lisinopril 20 mg oral tablet: 1 tab(s) orally once a day  metFORMIN 1000 mg oral tablet: 1 tab(s) orally 2 times a day  simvastatin 10 mg oral tablet: 1 tab(s) orally once a day (at bedtime)   amLODIPine 5 mg oral tablet: 1 tab(s) orally once a day  clopidogrel 75 mg oral tablet: 1 tab(s) orally once a day  ferrous sulfate 325 mg (65 mg elemental iron) oral tablet: 1 tab(s) orally once a day  furosemide 40 mg oral tablet: 1 tab(s) orally once a day  lisinopril 20 mg oral tablet: 1 tab(s) orally once a day  metFORMIN 1000 mg oral tablet: 1 tab(s) orally 2 times a day   simvastatin 10 mg oral tablet: 1 tab(s) orally once a day (at bedtime)

## 2022-03-09 NOTE — DIETITIAN INITIAL EVALUATION ADULT. - PROBLEM SELECTOR PLAN 1
- p/w progressive SOB x1wk  - ddx: PE vs new-onset CHF vs symptomatic anemia  - PE: decreased breath sounds b/l, pitting edema b/l (R>L), positive rashard sign  - hgb 7.8 (unknown baseline)  -   - CXR pulmonary congestion (f/u official reading)  - s/p furosemide 80 in ED  - c/w oxygen supp  - telemonitoring  - f/u dimer, LE doppler, FOBT, retic, bili    - Cardio, Dr. Tovar, consulted  - hold empiric AC given anemia  - primary team obtain CTA if indicated No return call, will close encounter.   plan per MD

## 2022-03-09 NOTE — DISCHARGE NOTE PROVIDER - PROVIDER TOKENS
PROVIDER:[TOKEN:[[id number]]],PROVIDER:[TOKEN:[[id number]]] PROVIDER:[TOKEN:[[id number]]],PROVIDER:[TOKEN:[[id number]]],PROVIDER:[TOKEN:[[id number]],SCHEDULEDAPPT:[03/15/2022],SCHEDULEDAPPTTIME:[02:30 PM]]

## 2022-03-09 NOTE — DIETITIAN INITIAL EVALUATION ADULT. - OTHER INFO
Pt lives home with family PTA, alert, oriented, able to communicate but with difficult due to hard of hearing; appetite ok, but dislikes hospital food without salt, some food choices obtained and forwarded to Dietary, claiming going home today, no other nutrition related issues to discuss, intake 76 to 100% at times per flow sheet if food likes; Informed by RN, received a phone call from family, spoke to daughter over phone, family concerning about low Na diet to follow at home, pt always likes extra salt with food, nutrition tips discussed, additional written copy sent home on Healthy Plate, CHF Nutrition Therapy, Low Na Food Flavors, RD business card given for contact as needed

## 2022-03-09 NOTE — DISCHARGE NOTE NURSING/CASE MANAGEMENT/SOCIAL WORK - NSDCPEFALRISK_GEN_ALL_CORE
For information on Fall & Injury Prevention, visit: [email or web address] OR  [email or web address] OR  https://www.cdc.gov/steadi/patient.html

## 2022-03-09 NOTE — DIETITIAN INITIAL EVALUATION ADULT. - FACTORS AFF FOOD INTAKE
acute on chronic comorbidities including CHF, AHRF, s/p PSBO/Hoahaoism/ethnic/cultural/personal food preferences

## 2022-03-09 NOTE — DISCHARGE NOTE PROVIDER - CARE PROVIDERS DIRECT ADDRESSES
,[email or web address],DirectAddress_Unknown [email or web address],DirectAddress_Unknown,DirectAddress_Unknown

## 2022-03-09 NOTE — DIETITIAN INITIAL EVALUATION ADULT. - PERTINENT LABORATORY DATA
03-09 Na134 mmol/L<L> Glu 89 mg/dL K+ 4.7 mmol/L Cr  0.87 mg/dL BUN 26 mg/dL<H>   03-09 Phos 4.0 mg/dL   03-07 Alb 2.6 g/dL<L>       03-03 Chol 160 mg/dL LDL --    HDL 96 mg/dL Trig 36 mg/dL  03-03-22 @ 18:33 HgbA1C 6.3 [4.0 - 5.6]

## 2022-03-09 NOTE — DISCHARGE NOTE NURSING/CASE MANAGEMENT/SOCIAL WORK - PATIENT PORTAL LINK FT
You can access the FollowMyHealth Patient Portal offered by [facility] by registering at the following website: [email or web address]. By joining [facility] FollowMyHealth portal, you will also be able to view your health information using other applications (apps) compatible with our system.

## 2022-03-09 NOTE — DIETITIAN INITIAL EVALUATION ADULT. - LITERATURE/VIDEOS GIVEN
Healthy Plate from [employer]; CHF Nutrition Therapy, Low Salt Food Flavors from Nutrition Care Manual

## 2022-03-09 NOTE — DIETITIAN INITIAL EVALUATION ADULT. - PROBLEM SELECTOR PLAN 4
- h/o HTN, on unknown dose of lisinopril and amlodipine  - hold home medications for now  - primary team to follow up on home dose plan per MD

## 2022-03-09 NOTE — DISCHARGE NOTE PROVIDER - CARE PROVIDER_API CALL
[facility]  [address] [place] [address]  Phone: [phone]  Fax: [phone]  Follow Up Time:     Guy Roque)  Gastroenterology; Internal Medicine  [address] [facility] [place] [address]  Phone: [phone]  Fax: [phone]  Follow Up Time:    Chico Betancur   MEDICINE  [address] [place] [address]  Phone: [phone]  Fax: [phone]  Follow Up Time:     Guy Roque)  Gastroenterology; Internal Medicine  [address] [facility] [place] [address]  Phone: [phone]  Fax: [phone]  Follow Up Time:     Javon Tovar)  Cardiology  [address] [place] [address]  Phone: [phone]  Fax: [phone]  Scheduled Appointment: 03/15/2022 02:30 PM

## 2022-03-09 NOTE — DISCHARGE NOTE PROVIDER - HOSPITAL COURSE
Patient is a 89yoF, AAOx3 and ambulates w/ walker, w/ PMH of CVA, HTN, DM, p/w SOB for a week. Admitted for acute hypoxic respiratory failure requiring oxygen supplementation 2/2 acute decompensated HFpEF now resolved. Noted to have anemia so CT A/P was obtained showing rectal wall thickening. Seen by GI and patient declines colonoscopy. DC planning.    Patient is a 89yoF, AAOx3 and ambulates w/ walker, w/ PMH of CVA, HTN, DM, p/w SOB for a week. Admitted for acute hypoxic respiratory failure requiring oxygen supplementation 2/2 acute decompensated HFpEF now resolved. Noted to have anemia so CT A/P was obtained showing rectal wall thickening. Seen by GI and patient declines colonoscopy.    Patient is a 89yoF, AAOx3 and ambulates w/ walker, w/ PMH of CVA, HTN, DM, p/w SOB for a week. Admitted for acute hypoxic respiratory failure requiring oxygen supplementation 2/2 acute decompensated HFpEF. Pt w/ decreased breath sounds and b/l pitting edema on exam. Pro-, and CXR showing pulmonary congestion. Pt received 80mg IV lasix in ED and was started on 40mg IV lasix on tele floor with improvement in symptoms.  Tovar cardio consulted. LE doppler neg for DVT. Echo showed EF 58%, Grade II diastolic dysfunction (moderate) and moderately increased RV systolic pressure at  48 mmHg. Pt was titrated off O2 to RA. Tylenol continued for OA. Pt noted to have iron deficiency anemia. Iron tablets started. Surgery was consulted for possible SBO vs. Ileus on CT scan. Repeat CT a/p showed resolution but possible mural thickening of the rectum may be due to underdistention, proctitis, or rectal cancer. GI Mya consulted but patient opted to pursue further evaluation as an outpatient. Home meds for HTN and CVA continued and SSI for DM. Patient medically stable for discharge on PO lasix and with outpatient GI and cardio follow up.

## 2022-03-09 NOTE — DIETITIAN INITIAL EVALUATION ADULT. - PERTINENT MEDS FT
MEDICATIONS  (STANDING):  acetaminophen     Tablet .. 650 milliGRAM(s) Oral every 6 hours  amLODIPine   Tablet 5 milliGRAM(s) Oral daily  clopidogrel Tablet 75 milliGRAM(s) Oral daily  enoxaparin Injectable 40 milliGRAM(s) SubCutaneous every 24 hours  ferrous    sulfate 325 milliGRAM(s) Oral daily  furosemide    Tablet 40 milliGRAM(s) Oral daily  gabapentin 300 milliGRAM(s) Oral two times a day  lisinopril 20 milliGRAM(s) Oral daily  simvastatin 10 milliGRAM(s) Oral at bedtime  sodium chloride 0.9% lock flush 3 milliLiter(s) IV Push every 8 hours

## 2022-03-09 NOTE — DISCHARGE NOTE PROVIDER - ATTENDING DISCHARGE PHYSICAL EXAMINATION:
Patient seen and examined on 3/9/22. This is a late entry.    PHYSICAL EXAM:  GENERAL: NAD, well-developed, lying in bed  HEAD:  Atraumatic, Normocephalic  EYES: EOMI, PERRLA, conjunctiva and sclera clear  NECK: Supple, No JVD  CHEST/LUNG: Clear to auscultation bilaterally; No wheeze  HEART: Regular rate and rhythm; No murmurs, rubs, or gallops  ABDOMEN: Soft, Nontender, Nondistended; Bowel sounds present  EXTREMITIES:  2+ Peripheral Pulses, No clubbing, cyanosis, or edema  PSYCH: AAOx3, calm, cooperative  NEUROLOGY: non-focal  SKIN: No rashes or lesions

## 2022-03-09 NOTE — DISCHARGE NOTE PROVIDER - NSDCCPCAREPLAN_GEN_ALL_CORE_FT
PRINCIPAL DISCHARGE DIAGNOSIS  Diagnosis: Acute respiratory failure with hypoxia  Assessment and Plan of Treatment:       SECONDARY DISCHARGE DIAGNOSES  Diagnosis: Acute diastolic congestive heart failure  Assessment and Plan of Treatment:     Diagnosis: HTN (hypertension)  Assessment and Plan of Treatment:     Diagnosis: DM (diabetes mellitus)  Assessment and Plan of Treatment:     Diagnosis: Abnormal finding on imaging  Assessment and Plan of Treatment:     Diagnosis: Iron deficiency anemia  Assessment and Plan of Treatment:      PRINCIPAL DISCHARGE DIAGNOSIS  Diagnosis: Acute respiratory failure with hypoxia  Assessment and Plan of Treatment:       SECONDARY DISCHARGE DIAGNOSES  Diagnosis: Acute diastolic congestive heart failure  Assessment and Plan of Treatment:     Diagnosis: HTN (hypertension)  Assessment and Plan of Treatment:     Diagnosis: DM (diabetes mellitus)  Assessment and Plan of Treatment:     Diagnosis: Abnormal finding on imaging  Assessment and Plan of Treatment:     Diagnosis: Iron deficiency anemia  Assessment and Plan of Treatment:     Diagnosis: CVA (cerebrovascular accident)  Assessment and Plan of Treatment:      PRINCIPAL DISCHARGE DIAGNOSIS  Diagnosis: Acute respiratory failure with hypoxia  Assessment and Plan of Treatment: You came to the hospital complaining of shortness of breath for a week. You were noted to have decreased breath sounds, swelling in your legs and oxygen level was low, requiring oxygen supplementation with nasal cannula. A chest XRAY showing pulmonary congestion. You were admitted to the cardiac telemetry floor and started on diuresis with IV lasix and showed clinical improvement. Cardiologist Dr. Tovar was consulted and followed you during your hospitalization. An echo of your heart showed Grade II diastolic dysfunction (moderate) and moderately increased RV systolic pressure at  48 mmHg but with a normal ejection fraction (58%). This is indicative of diastolic heart failur ewith preserved ejection fraction. You were titrated off oxygen and are now stable for discharge on oral lasix and with outpatient cardiology follow up with your cardiologist or Dr. Tovar (details below).      SECONDARY DISCHARGE DIAGNOSES  Diagnosis: Acute diastolic congestive heart failure  Assessment and Plan of Treatment: You were found to acute disastolic congestive heart failure as discussed above. We are discharging you on 40mg oral lasix which is a diuretic to prevent fluid overload Please follow up with Dr. Tovar at his office (details below) for further management.    Diagnosis: Iron deficiency anemia  Assessment and Plan of Treatment: You were found to have a low hemoglobin level. An iron panel displayed iron defiency anemia. We started you on iron tablets and you were sent for CT scan of your abdomen which showed possible mural thickening of the rectum may be due to underdistention, proctitis, or rectal cancer. Gastroenterologist DR. Roque was consulted for further workup and possible clonooscopy but you opted to follow up as an outpatient. Please follow up with Dr. Roque (details below) or your GI doctor within 1 week of discharge for further workup.    Diagnosis: HTN (hypertension)  Assessment and Plan of Treatment: You have previously been diagnosed with hypertension (high blood pressure). We managed your blood pressure during your hospitalization with your home medications. Please take your medications on time and as prescribed. Monitor your blood pressure at home, keep a log of your home readings and follow up with your Primary Care Physician. As per AHA/ACC guidelines, it is important to adhere to a DASH Diet of fresh fruits, vegetables, lean meats such as poultry and fish, and whole wheat carbs. 30 minutes of aerobic exercise per day 3-4 times a week, reducing salt intake <1.5g/day, and cutting down on highly processed foods are also shown to reduce high blood pressure. Uncontrolled BP may result in organ damage to your eyes, brain, heart, and kidneys by causing strokes, heart attacks, kidney failure, and bleeds in your eye. Please follow up with your primary care physician within 1-2 weeks after discharge and routinely after.    Diagnosis: DM (diabetes mellitus)  Assessment and Plan of Treatment: You have previously been diagnosed with diabetes mellitus for which you take metformin at home. Your HbA1c at the hospital was 6.3. We stopped your home medication and managed you on sliding scale insulin while you were hospitalized with good glycemic control. Continue with your blood sugar medication. Please check your blood sugar levels twice daily - Morning/Afternoon, and Lunch/Bedtime the following day. It is important to keep a record of your blood sugar readings. We recommend you maintain a healthy diet that is low in sugar, carbohydrates and fat. Please limit your intake of high sugar and high carbohydrate foods such as pasta, rice, and bread. Exercise frequently as tolerated. Please follow up with you primary care physician within one week of your discharge to further manage your diabetes and monitor your Hemoglobin A1c levels after 3 months to evaluate your diabetes control.    Diagnosis: CVA (cerebrovascular accident)  Assessment and Plan of Treatment: You have previously been diagnosed with CVA. We continued your home medications of statin and plavix during your hospitalization. Please continue to take your medications as prescribed and follow up with your primary doctor within 1 week of discharge and routinely after for further management.     PRINCIPAL DISCHARGE DIAGNOSIS  Diagnosis: Acute respiratory failure with hypoxia  Assessment and Plan of Treatment: You came to the hospital complaining of shortness of breath for a week. You were noted to have decreased breath sounds, swelling in your legs and oxygen level was low, requiring oxygen supplementation with nasal cannula. A chest XRAY showing pulmonary congestion. You were admitted to the cardiac telemetry floor and started on diuresis with IV lasix and showed clinical improvement. Cardiologist Dr. Tovar was consulted and followed you during your hospitalization. An echo of your heart showed Grade II diastolic dysfunction (moderate) and moderately increased RV systolic pressure at 48 mmHg but with a normal ejection fraction (58%). This is indicative of diastolic heart failur ewith preserved ejection fraction. You were titrated off oxygen and are now stable for discharge on oral lasix and with outpatient cardiology follow up with your cardiologist or Dr. Tovar 3/15 Tue at 2:30pm (details below).      SECONDARY DISCHARGE DIAGNOSES  Diagnosis: Acute diastolic congestive heart failure  Assessment and Plan of Treatment: You were found to acute disastolic congestive heart failure as discussed above. We are discharging you on 40mg oral lasix which is a diuretic to prevent fluid overload Please follow up with Dr. Tovar at his office on 3/15 Tue at 2:30pm (details below) for further management.    Diagnosis: HTN (hypertension)  Assessment and Plan of Treatment: You have previously been diagnosed with hypertension (high blood pressure). We managed your blood pressure during your hospitalization with your home medications. Please take your medications on time and as prescribed. Monitor your blood pressure at home, keep a log of your home readings and follow up with your Primary Care Physician. As per AHA/ACC guidelines, it is important to adhere to a DASH Diet of fresh fruits, vegetables, lean meats such as poultry and fish, and whole wheat carbs. 30 minutes of aerobic exercise per day 3-4 times a week, reducing salt intake <1.5g/day, and cutting down on highly processed foods are also shown to reduce high blood pressure. Uncontrolled BP may result in organ damage to your eyes, brain, heart, and kidneys by causing strokes, heart attacks, kidney failure, and bleeds in your eye. Please follow up with your primary care physician within 1-2 weeks after discharge and routinely after.    Diagnosis: DM (diabetes mellitus)  Assessment and Plan of Treatment: You have previously been diagnosed with diabetes mellitus for which you take metformin at home. Your HbA1c at the hospital was 6.3. We stopped your home medication and managed you on sliding scale insulin while you were hospitalized with good glycemic control. Continue with your blood sugar medication. Please check your blood sugar levels twice daily - Morning/Afternoon, and Lunch/Bedtime the following day. It is important to keep a record of your blood sugar readings. We recommend you maintain a healthy diet that is low in sugar, carbohydrates and fat. Please limit your intake of high sugar and high carbohydrate foods such as pasta, rice, and bread. Exercise frequently as tolerated. Please follow up with you primary care physician within one week of your discharge to further manage your diabetes and monitor your Hemoglobin A1c levels after 3 months to evaluate your diabetes control.    Diagnosis: Iron deficiency anemia  Assessment and Plan of Treatment: You were found to have a low hemoglobin level. An iron panel displayed iron defiency anemia. We started you on iron tablets and you were sent for CT scan of your abdomen which showed possible mural thickening of the rectum may be due to underdistention, proctitis, or rectal cancer. Gastroenterologist DR. Roque was consulted for further workup and possible clonooscopy but you opted to follow up as an outpatient. Please follow up with Dr. Roque (details below) or your GI doctor within 1 week of discharge for further workup.    Diagnosis: CVA (cerebrovascular accident)  Assessment and Plan of Treatment: You have previously been diagnosed with CVA. We continued your home medications of statin and plavix during your hospitalization. Please continue to take your medications as prescribed and follow up with your primary doctor within 1 week of discharge and routinely after for further management.

## 2022-05-04 ENCOUNTER — INPATIENT (INPATIENT)
Facility: HOSPITAL | Age: 87
LOS: 4 days | Discharge: HOME | DRG: 812 | End: 2022-05-09
Attending: INTERNAL MEDICINE | Admitting: INTERNAL MEDICINE
Payer: COMMERCIAL

## 2022-05-04 VITALS
SYSTOLIC BLOOD PRESSURE: 146 MMHG | HEIGHT: 60 IN | HEART RATE: 68 BPM | DIASTOLIC BLOOD PRESSURE: 75 MMHG | RESPIRATION RATE: 17 BRPM | TEMPERATURE: 99 F | OXYGEN SATURATION: 98 %

## 2022-05-04 DIAGNOSIS — D64.9 ANEMIA, UNSPECIFIED: ICD-10-CM

## 2022-05-04 LAB
ALBUMIN SERPL ELPH-MCNC: 3 G/DL — LOW (ref 3.5–5)
ALP SERPL-CCNC: 114 U/L — SIGNIFICANT CHANGE UP (ref 40–120)
ALT FLD-CCNC: 14 U/L DA — SIGNIFICANT CHANGE UP (ref 10–60)
ANION GAP SERPL CALC-SCNC: 9 MMOL/L — SIGNIFICANT CHANGE UP (ref 5–17)
AST SERPL-CCNC: 12 U/L — SIGNIFICANT CHANGE UP (ref 10–40)
BASOPHILS # BLD AUTO: 0.04 K/UL — SIGNIFICANT CHANGE UP (ref 0–0.2)
BASOPHILS NFR BLD AUTO: 0.6 % — SIGNIFICANT CHANGE UP (ref 0–2)
BILIRUB SERPL-MCNC: 0.3 MG/DL — SIGNIFICANT CHANGE UP (ref 0.2–1.2)
BUN SERPL-MCNC: 23 MG/DL — HIGH (ref 7–18)
CALCIUM SERPL-MCNC: 8.7 MG/DL — SIGNIFICANT CHANGE UP (ref 8.4–10.5)
CHLORIDE SERPL-SCNC: 102 MMOL/L — SIGNIFICANT CHANGE UP (ref 96–108)
CO2 SERPL-SCNC: 20 MMOL/L — LOW (ref 22–31)
CREAT SERPL-MCNC: 1.01 MG/DL — SIGNIFICANT CHANGE UP (ref 0.5–1.3)
EGFR: 53 ML/MIN/1.73M2 — LOW
EOSINOPHIL # BLD AUTO: 0.17 K/UL — SIGNIFICANT CHANGE UP (ref 0–0.5)
EOSINOPHIL NFR BLD AUTO: 2.8 % — SIGNIFICANT CHANGE UP (ref 0–6)
GLUCOSE SERPL-MCNC: 95 MG/DL — SIGNIFICANT CHANGE UP (ref 70–99)
HCT VFR BLD CALC: 20.9 % — CRITICAL LOW (ref 34.5–45)
HGB BLD-MCNC: 6.8 G/DL — CRITICAL LOW (ref 11.5–15.5)
IMM GRANULOCYTES NFR BLD AUTO: 0.3 % — SIGNIFICANT CHANGE UP (ref 0–1.5)
LYMPHOCYTES # BLD AUTO: 1 K/UL — SIGNIFICANT CHANGE UP (ref 1–3.3)
LYMPHOCYTES # BLD AUTO: 16.2 % — SIGNIFICANT CHANGE UP (ref 13–44)
MCHC RBC-ENTMCNC: 28.6 PG — SIGNIFICANT CHANGE UP (ref 27–34)
MCHC RBC-ENTMCNC: 32.5 GM/DL — SIGNIFICANT CHANGE UP (ref 32–36)
MCV RBC AUTO: 87.8 FL — SIGNIFICANT CHANGE UP (ref 80–100)
MONOCYTES # BLD AUTO: 0.55 K/UL — SIGNIFICANT CHANGE UP (ref 0–0.9)
MONOCYTES NFR BLD AUTO: 8.9 % — SIGNIFICANT CHANGE UP (ref 2–14)
NEUTROPHILS # BLD AUTO: 4.4 K/UL — SIGNIFICANT CHANGE UP (ref 1.8–7.4)
NEUTROPHILS NFR BLD AUTO: 71.2 % — SIGNIFICANT CHANGE UP (ref 43–77)
NRBC # BLD: 0 /100 WBCS — SIGNIFICANT CHANGE UP (ref 0–0)
NT-PROBNP SERPL-SCNC: 1025 PG/ML — HIGH (ref 0–450)
PLATELET # BLD AUTO: 424 K/UL — HIGH (ref 150–400)
POTASSIUM SERPL-MCNC: 4.8 MMOL/L — SIGNIFICANT CHANGE UP (ref 3.5–5.3)
POTASSIUM SERPL-SCNC: 4.8 MMOL/L — SIGNIFICANT CHANGE UP (ref 3.5–5.3)
PROT SERPL-MCNC: 6.7 G/DL — SIGNIFICANT CHANGE UP (ref 6–8.3)
RBC # BLD: 2.38 M/UL — LOW (ref 3.8–5.2)
RBC # FLD: 14.6 % — HIGH (ref 10.3–14.5)
SODIUM SERPL-SCNC: 131 MMOL/L — LOW (ref 135–145)
WBC # BLD: 6.18 K/UL — SIGNIFICANT CHANGE UP (ref 3.8–10.5)
WBC # FLD AUTO: 6.18 K/UL — SIGNIFICANT CHANGE UP (ref 3.8–10.5)

## 2022-05-04 PROCEDURE — 71045 X-RAY EXAM CHEST 1 VIEW: CPT | Mod: 26

## 2022-05-04 PROCEDURE — 99285 EMERGENCY DEPT VISIT HI MDM: CPT

## 2022-05-04 RX ORDER — FUROSEMIDE 40 MG
40 TABLET ORAL ONCE
Refills: 0 | Status: COMPLETED | OUTPATIENT
Start: 2022-05-04 | End: 2022-05-04

## 2022-05-04 RX ADMIN — Medication 40 MILLIGRAM(S): at 23:36

## 2022-05-04 NOTE — ED ADULT NURSE NOTE - OBJECTIVE STATEMENT
Patient presented to the ED with c/o abnormal lab result. Pt hard of hearing. Pitting edema noted to B/L LEs.

## 2022-05-04 NOTE — ED ADULT NURSE NOTE - NSIMPLEMENTINTERV_GEN_ALL_ED
Implemented All Fall with Harm Risk Interventions:  [place] to call system. Call bell, personal items and telephone within reach. Instruct patient to call for assistance. Room bathroom lighting operational. Non-slip footwear when patient is off stretcher. Physically safe environment: no spills, clutter or unnecessary equipment. Stretcher in lowest position, wheels locked, appropriate side rails in place. Provide visual cue, wrist band, yellow gown, etc. Monitor gait and stability. Monitor for mental status changes and reorient to person, place, and time. Review medications for side effects contributing to fall risk. Reinforce activity limits and safety measures with patient and family. Provide visual clues: red socks.

## 2022-05-04 NOTE — ED PROVIDER NOTE - NS ED ROS FT
CONSTITUTIONAL: no fever  EYES: no eye pain   ENMT: no throat pain  CARDIOVASCULAR: no chest pain, +edema   RESPIRATORY: +shortness of breath   GASTROINTESTINAL: no abdominal pain   GENITOURINARY: no dysuria   SKIN: no rashes  NEUROLOGICAL: no headache

## 2022-05-04 NOTE — ED PROVIDER NOTE - PHYSICAL EXAMINATION
GENERAL: well appearing, no acute distress   HEAD: atraumatic   EYES: EOMI, pink conjunctiva   ENT: moist oral mucosa   CARDIAC: RRR, mild LE edema, distal pulses present   RESPIRATORY: lungs CTAB, no increased work of breathing   GASTROINTESTINAL: no abdominal tenderness, no rebound or guarding, bowel sounds presents  GENITOURINARY: no CVA tenderness   MUSCULOSKELETAL: no deformity   NEUROLOGICAL: AAOx3, spontaneous movement of extremities   SKIN: intact   PSYCHIATRIC: cooperative  HEME LYMPH: no lymphadenopathy

## 2022-05-04 NOTE — ED PROVIDER NOTE - OBJECTIVE STATEMENT
89 yo F pmh CVA, DM, HTN, CHF, presents for admission from PCP office. Pt ran out of lasix x 4-5 days. Now with LE edema, mild SOB. Out pt labs from a few days ago with worsening anemia. Denies other acute complaints.

## 2022-05-05 DIAGNOSIS — I50.9 HEART FAILURE, UNSPECIFIED: ICD-10-CM

## 2022-05-05 DIAGNOSIS — K62.89 OTHER SPECIFIED DISEASES OF ANUS AND RECTUM: ICD-10-CM

## 2022-05-05 DIAGNOSIS — E11.9 TYPE 2 DIABETES MELLITUS WITHOUT COMPLICATIONS: ICD-10-CM

## 2022-05-05 DIAGNOSIS — Z86.73 PERSONAL HISTORY OF TRANSIENT ISCHEMIC ATTACK (TIA), AND CEREBRAL INFARCTION WITHOUT RESIDUAL DEFICITS: ICD-10-CM

## 2022-05-05 DIAGNOSIS — Z29.9 ENCOUNTER FOR PROPHYLACTIC MEASURES, UNSPECIFIED: ICD-10-CM

## 2022-05-05 DIAGNOSIS — I10 ESSENTIAL (PRIMARY) HYPERTENSION: ICD-10-CM

## 2022-05-05 DIAGNOSIS — D64.9 ANEMIA, UNSPECIFIED: ICD-10-CM

## 2022-05-05 DIAGNOSIS — E78.5 HYPERLIPIDEMIA, UNSPECIFIED: ICD-10-CM

## 2022-05-05 LAB
ALBUMIN SERPL ELPH-MCNC: 3.1 G/DL — LOW (ref 3.5–5)
ALP SERPL-CCNC: 108 U/L — SIGNIFICANT CHANGE UP (ref 40–120)
ALT FLD-CCNC: 14 U/L DA — SIGNIFICANT CHANGE UP (ref 10–60)
ANION GAP SERPL CALC-SCNC: 7 MMOL/L — SIGNIFICANT CHANGE UP (ref 5–17)
APTT BLD: 29.8 SEC — SIGNIFICANT CHANGE UP (ref 27.5–35.5)
AST SERPL-CCNC: 15 U/L — SIGNIFICANT CHANGE UP (ref 10–40)
BASOPHILS # BLD AUTO: 0.04 K/UL — SIGNIFICANT CHANGE UP (ref 0–0.2)
BASOPHILS NFR BLD AUTO: 0.6 % — SIGNIFICANT CHANGE UP (ref 0–2)
BILIRUB SERPL-MCNC: 1.1 MG/DL — SIGNIFICANT CHANGE UP (ref 0.2–1.2)
BUN SERPL-MCNC: 20 MG/DL — HIGH (ref 7–18)
CALCIUM SERPL-MCNC: 8.9 MG/DL — SIGNIFICANT CHANGE UP (ref 8.4–10.5)
CHLORIDE SERPL-SCNC: 103 MMOL/L — SIGNIFICANT CHANGE UP (ref 96–108)
CO2 SERPL-SCNC: 23 MMOL/L — SIGNIFICANT CHANGE UP (ref 22–31)
CREAT SERPL-MCNC: 0.83 MG/DL — SIGNIFICANT CHANGE UP (ref 0.5–1.3)
EGFR: 67 ML/MIN/1.73M2 — SIGNIFICANT CHANGE UP
EOSINOPHIL # BLD AUTO: 0.27 K/UL — SIGNIFICANT CHANGE UP (ref 0–0.5)
EOSINOPHIL NFR BLD AUTO: 3.9 % — SIGNIFICANT CHANGE UP (ref 0–6)
GLUCOSE BLDC GLUCOMTR-MCNC: 67 MG/DL — LOW (ref 70–99)
GLUCOSE BLDC GLUCOMTR-MCNC: 76 MG/DL — SIGNIFICANT CHANGE UP (ref 70–99)
GLUCOSE BLDC GLUCOMTR-MCNC: 88 MG/DL — SIGNIFICANT CHANGE UP (ref 70–99)
GLUCOSE BLDC GLUCOMTR-MCNC: 94 MG/DL — SIGNIFICANT CHANGE UP (ref 70–99)
GLUCOSE SERPL-MCNC: 73 MG/DL — SIGNIFICANT CHANGE UP (ref 70–99)
HCT VFR BLD CALC: 25.4 % — LOW (ref 34.5–45)
HCT VFR BLD CALC: 26.7 % — LOW (ref 34.5–45)
HGB BLD-MCNC: 8.4 G/DL — LOW (ref 11.5–15.5)
HGB BLD-MCNC: 8.8 G/DL — LOW (ref 11.5–15.5)
IMM GRANULOCYTES NFR BLD AUTO: 0.3 % — SIGNIFICANT CHANGE UP (ref 0–1.5)
INR BLD: 1 RATIO — SIGNIFICANT CHANGE UP (ref 0.88–1.16)
LYMPHOCYTES # BLD AUTO: 1.38 K/UL — SIGNIFICANT CHANGE UP (ref 1–3.3)
LYMPHOCYTES # BLD AUTO: 20.1 % — SIGNIFICANT CHANGE UP (ref 13–44)
MAGNESIUM SERPL-MCNC: 1.8 MG/DL — SIGNIFICANT CHANGE UP (ref 1.6–2.6)
MCHC RBC-ENTMCNC: 28.2 PG — SIGNIFICANT CHANGE UP (ref 27–34)
MCHC RBC-ENTMCNC: 28.7 PG — SIGNIFICANT CHANGE UP (ref 27–34)
MCHC RBC-ENTMCNC: 33 GM/DL — SIGNIFICANT CHANGE UP (ref 32–36)
MCHC RBC-ENTMCNC: 33.1 GM/DL — SIGNIFICANT CHANGE UP (ref 32–36)
MCV RBC AUTO: 85.6 FL — SIGNIFICANT CHANGE UP (ref 80–100)
MCV RBC AUTO: 86.7 FL — SIGNIFICANT CHANGE UP (ref 80–100)
MONOCYTES # BLD AUTO: 0.57 K/UL — SIGNIFICANT CHANGE UP (ref 0–0.9)
MONOCYTES NFR BLD AUTO: 8.3 % — SIGNIFICANT CHANGE UP (ref 2–14)
NEUTROPHILS # BLD AUTO: 4.59 K/UL — SIGNIFICANT CHANGE UP (ref 1.8–7.4)
NEUTROPHILS NFR BLD AUTO: 66.8 % — SIGNIFICANT CHANGE UP (ref 43–77)
NRBC # BLD: 0 /100 WBCS — SIGNIFICANT CHANGE UP (ref 0–0)
NRBC # BLD: 0 /100 WBCS — SIGNIFICANT CHANGE UP (ref 0–0)
PHOSPHATE SERPL-MCNC: 3.9 MG/DL — SIGNIFICANT CHANGE UP (ref 2.5–4.5)
PLATELET # BLD AUTO: 415 K/UL — HIGH (ref 150–400)
PLATELET # BLD AUTO: 448 K/UL — HIGH (ref 150–400)
POTASSIUM SERPL-MCNC: 4.2 MMOL/L — SIGNIFICANT CHANGE UP (ref 3.5–5.3)
POTASSIUM SERPL-SCNC: 4.2 MMOL/L — SIGNIFICANT CHANGE UP (ref 3.5–5.3)
PROT SERPL-MCNC: 6.6 G/DL — SIGNIFICANT CHANGE UP (ref 6–8.3)
PROTHROM AB SERPL-ACNC: 11.9 SEC — SIGNIFICANT CHANGE UP (ref 10.5–13.4)
RAPID RVP RESULT: SIGNIFICANT CHANGE UP
RBC # BLD: 2.93 M/UL — LOW (ref 3.8–5.2)
RBC # BLD: 3.12 M/UL — LOW (ref 3.8–5.2)
RBC # FLD: 14 % — SIGNIFICANT CHANGE UP (ref 10.3–14.5)
RBC # FLD: 14.1 % — SIGNIFICANT CHANGE UP (ref 10.3–14.5)
SARS-COV-2 RNA SPEC QL NAA+PROBE: SIGNIFICANT CHANGE UP
SODIUM SERPL-SCNC: 133 MMOL/L — LOW (ref 135–145)
WBC # BLD: 6 K/UL — SIGNIFICANT CHANGE UP (ref 3.8–10.5)
WBC # BLD: 6.87 K/UL — SIGNIFICANT CHANGE UP (ref 3.8–10.5)
WBC # FLD AUTO: 6 K/UL — SIGNIFICANT CHANGE UP (ref 3.8–10.5)
WBC # FLD AUTO: 6.87 K/UL — SIGNIFICANT CHANGE UP (ref 3.8–10.5)

## 2022-05-05 PROCEDURE — 99222 1ST HOSP IP/OBS MODERATE 55: CPT

## 2022-05-05 PROCEDURE — 12345: CPT | Mod: NC

## 2022-05-05 PROCEDURE — 93010 ELECTROCARDIOGRAM REPORT: CPT

## 2022-05-05 PROCEDURE — 93970 EXTREMITY STUDY: CPT | Mod: 26

## 2022-05-05 PROCEDURE — 99223 1ST HOSP IP/OBS HIGH 75: CPT | Mod: GC

## 2022-05-05 RX ORDER — SIMVASTATIN 20 MG/1
10 TABLET, FILM COATED ORAL AT BEDTIME
Refills: 0 | Status: DISCONTINUED | OUTPATIENT
Start: 2022-05-05 | End: 2022-05-09

## 2022-05-05 RX ORDER — OXYCODONE AND ACETAMINOPHEN 5; 325 MG/1; MG/1
1 TABLET ORAL EVERY 6 HOURS
Refills: 0 | Status: DISCONTINUED | OUTPATIENT
Start: 2022-05-05 | End: 2022-05-07

## 2022-05-05 RX ORDER — DEXTROSE 50 % IN WATER 50 %
25 SYRINGE (ML) INTRAVENOUS ONCE
Refills: 0 | Status: DISCONTINUED | OUTPATIENT
Start: 2022-05-05 | End: 2022-05-06

## 2022-05-05 RX ORDER — SODIUM CHLORIDE 9 MG/ML
1000 INJECTION, SOLUTION INTRAVENOUS
Refills: 0 | Status: DISCONTINUED | OUTPATIENT
Start: 2022-05-05 | End: 2022-05-06

## 2022-05-05 RX ORDER — GLUCAGON INJECTION, SOLUTION 0.5 MG/.1ML
1 INJECTION, SOLUTION SUBCUTANEOUS ONCE
Refills: 0 | Status: DISCONTINUED | OUTPATIENT
Start: 2022-05-05 | End: 2022-05-06

## 2022-05-05 RX ORDER — DEXTROSE 50 % IN WATER 50 %
12.5 SYRINGE (ML) INTRAVENOUS ONCE
Refills: 0 | Status: DISCONTINUED | OUTPATIENT
Start: 2022-05-05 | End: 2022-05-06

## 2022-05-05 RX ORDER — DEXTROSE 50 % IN WATER 50 %
15 SYRINGE (ML) INTRAVENOUS ONCE
Refills: 0 | Status: DISCONTINUED | OUTPATIENT
Start: 2022-05-05 | End: 2022-05-06

## 2022-05-05 RX ORDER — ACETAMINOPHEN 500 MG
650 TABLET ORAL EVERY 6 HOURS
Refills: 0 | Status: DISCONTINUED | OUTPATIENT
Start: 2022-05-05 | End: 2022-05-09

## 2022-05-05 RX ORDER — FUROSEMIDE 40 MG
40 TABLET ORAL DAILY
Refills: 0 | Status: DISCONTINUED | OUTPATIENT
Start: 2022-05-05 | End: 2022-05-06

## 2022-05-05 RX ORDER — INSULIN LISPRO 100/ML
VIAL (ML) SUBCUTANEOUS
Refills: 0 | Status: DISCONTINUED | OUTPATIENT
Start: 2022-05-05 | End: 2022-05-09

## 2022-05-05 RX ORDER — FERROUS SULFATE 325(65) MG
325 TABLET ORAL DAILY
Refills: 0 | Status: DISCONTINUED | OUTPATIENT
Start: 2022-05-05 | End: 2022-05-06

## 2022-05-05 RX ORDER — PANTOPRAZOLE SODIUM 20 MG/1
40 TABLET, DELAYED RELEASE ORAL EVERY 12 HOURS
Refills: 0 | Status: DISCONTINUED | OUTPATIENT
Start: 2022-05-05 | End: 2022-05-07

## 2022-05-05 RX ADMIN — PANTOPRAZOLE SODIUM 40 MILLIGRAM(S): 20 TABLET, DELAYED RELEASE ORAL at 18:51

## 2022-05-05 RX ADMIN — Medication 650 MILLIGRAM(S): at 16:09

## 2022-05-05 RX ADMIN — Medication 650 MILLIGRAM(S): at 09:41

## 2022-05-05 RX ADMIN — Medication 650 MILLIGRAM(S): at 15:39

## 2022-05-05 RX ADMIN — Medication 650 MILLIGRAM(S): at 09:11

## 2022-05-05 RX ADMIN — SIMVASTATIN 10 MILLIGRAM(S): 20 TABLET, FILM COATED ORAL at 21:58

## 2022-05-05 RX ADMIN — Medication 40 MILLIGRAM(S): at 11:18

## 2022-05-05 RX ADMIN — PANTOPRAZOLE SODIUM 40 MILLIGRAM(S): 20 TABLET, DELAYED RELEASE ORAL at 06:32

## 2022-05-05 RX ADMIN — Medication 325 MILLIGRAM(S): at 11:18

## 2022-05-05 RX ADMIN — PANTOPRAZOLE SODIUM 40 MILLIGRAM(S): 20 TABLET, DELAYED RELEASE ORAL at 03:15

## 2022-05-05 NOTE — CONSULT NOTE ADULT - PROBLEM SELECTOR RECOMMENDATION 9
p/w Hgb 6.8  Improved to 8.4 after 1 unit PRBC  c/w PPI  c/w Iron supplement  Last plavix on Tuesday, 5/3. p/w Hgb 6.8  Improved to 8.4 after 1 unit PRBC  c/w PPI  c/w Iron supplement  Last plavix on Tuesday, 5/3.  Upper endoscopy /colonoscopy early next week (Monday or Tuesday)  Hold plavix x 5 days prior to procedure  COVID test within 72 hrs of procedure. p/w Hgb 6.8  Improved to 8.4 after 1 unit PRBC  c/w PPI  c/w Iron supplement  Last plavix on Tuesday, 5/3.  Upper endoscopy /colonoscopy early next week given recent plavix use   Hold plavix x 5 days prior to procedure if safe to do so  COVID test within 72 hrs of procedure. p/w Hgb 6.8  Improved to 8.4 after 1 unit PRBC  c/w PPI  c/w Iron supplement  Last plavix on Tuesday, 5/3.  Upper endoscopy /colonoscopy MOnday 5/9/22 given recent plavix use   GoLYTELY 4pm Sunday 5/8/22  Hold all AC for 24 hours  NPO after MN on Sunday 5/8/22  Hold plavix x 5 days prior to procedure if safe to do so  COVID test within 72 hrs of procedure.

## 2022-05-05 NOTE — H&P ADULT - HISTORY OF PRESENT ILLNESS
89 yo F, AAOx3 HARD OF HEARING, ambulates w/ walker, w/ Hx CHFpEF w/ GIIDD, CVA (>15yrs on Plavix), DM (no meds to avoid hypoglycemia), Fe def anemia, possible rectal mass and HTN who was BIB family for shortness of breath and abnormal labs at PCP office. Ms. Tejada was admitted on 3/3 - 3/9 for AHRF 2/2 fluid overload with new diagnosis of CHF and Fe def anemia. CT abd showed possible rectal mass but patient and family deferred to follow up with GI Dr. Roque as inpatient for scope. Since discharge home patient has been generally fatigued, with increased daytime sleepiness and over the last few days she has become increasingly short of breath with increased leg swelling. Also has not been complaint with iron or lasix 40 po daily for 5 days as she ran out of the medications. Was seen by PCP 4 days ago to have labs drawn and was called on 5/4 to come to the hospital for a critical hemoglobin. Ms. Tejada has not yet been able to follow up with cardiology or GI as outpatient due to long waits for appointment availability.

## 2022-05-05 NOTE — CONSULT NOTE ADULT - NS ATTEND AMEND GEN_ALL_CORE FT
- Symptomatic anemia.  - Rectal thickening on prior CT.    Patient recently seen for anemia and ?rectal thickening on CT amidst CHF exacerbation, w/u for anemia deferred to outpatient given CHF. Now presenting with symptomatic anemia without overt GI bleeding. VSS. Receiving PRBC. F/u post transfusion CBC. Recent plavix use. Plan for EGD/colon early next week to further evaluate anemia due to difficulty with outpatient f/u. - Symptomatic anemia.  - Rectal thickening on prior CT.    Patient recently seen for anemia and ?rectal thickening on CT amidst CHF exacerbation, w/u for anemia deferred to outpatient given CHF. Now presenting with symptomatic anemia without overt GI bleeding. VSS. Receiving PRBC. F/u post transfusion CBC. Recent plavix use. Plan for EGD/colon early next week to further evaluate anemia due to difficulty with outpatient f/u. e

## 2022-05-05 NOTE — H&P ADULT - ATTENDING COMMENTS
88 yo F, AAOx3 HARD OF HEARING, ambulates w/ walker, w/ Hx CHFpEF w/ GIIDD, CVA (>15yrs on Plavix), DM (no meds to avoid hypoglycemia), Fe def anemia, possible rectal mass and HTN who was BIB family for shortness of breath and abnormal labs at PCP office. Ms. Tejada was admitted on 3/3 - 3/9 for AHRF 2/2 fluid overload with new diagnosis of CHF and Fe def anemia. CT abd showed possible rectal mass but patient and family deferred to follow up with GI Dr. Roque as inpatient for scope. Since discharge home patient has been generally fatigued, with increased daytime sleepiness and over the last few days she has become increasingly short of breath with increased leg swelling. Also has not been complaint with iron or lasix 40 po daily for 5 days as she ran out of the medications. Was seen by PCP 4 days ago to have labs drawn and was called on 5/4 to come to the hospital for a critical hemoglobin. Ms. Tejada has not yet been able to follow up with cardiology or GI as outpatient due to long waits for appointment availability.    1 unit of PRBC and iv Lasix 40 mg given in ED.    Vital Signs Last 24 Hrs  T(C): 36.5 (05 May 2022 05:42), Max: 37.1 (04 May 2022 20:23)  T(F): 97.7 (05 May 2022 05:42), Max: 98.7 (04 May 2022 20:23)  HR: 78 (05 May 2022 05:42) (65 - 82)  BP: 137/72 (05 May 2022 05:42) (128/58 - 146/75)  BP(mean): --  RR: 18 (05 May 2022 05:42) (17 - 18)  SpO2: 100% (05 May 2022 05:42) (97% - 100%)    Physical exam as above.    labs and imaging studies reviewed.    Assessment and plan: 89 yo F, AAOx3 HARD OF HEARING, ambulates w/ walker, w/ Hx CHFpEF w/ GIIDD, CVA (>15yrs on Plavix), DM (no meds to avoid hypoglycemia), Fe def anemia, possible rectal mass( on prior imaging in March) and HTN who was BIB family for shortness of breath and abnormal labs at PCP office. Ms. Tejada was admitted on 3/3 - 3/9 for AHRF 2/2 fluid overload with new diagnosis of CHF and Fe def anemia. CT abd showed possible rectal mass but patient and family deferred to follow up with GI Dr. Roque. Since discharge home patient has been generally fatigued, with increased daytime sleepiness and over the last few days she has become increasingly short of breath with increased leg swelling. Also has not been complaint with iron or lasix 40 po daily for 5 days as she ran out of the medications. Was seen by PCP 4 days ago to have labs drawn and was called on 5/4 to come to the hospital for a critical hemoglobin. Patient has not yet been able to follow up with cardiology or GI as outpatient due to long waits for appointment availability.    in ED., AAO x3, hard of hearing  1 unit of PRBC and iv Lasix 40 mg given in ED.    Vital Signs Last 24 Hrs  T(C): 36.5 (05 May 2022 05:42), Max: 37.1 (04 May 2022 20:23)  T(F): 97.7 (05 May 2022 05:42), Max: 98.7 (04 May 2022 20:23)  HR: 78 (05 May 2022 05:42) (65 - 82)  BP: 137/72 (05 May 2022 05:42) (128/58 - 146/75)  BP(mean): --  RR: 18 (05 May 2022 05:42) (17 - 18)  SpO2: 100% (05 May 2022 05:42) (97% - 100%)    Physical exam as above.    labs and imaging studies reviewed.    Assessment and plan:  yo F, AAOx3 HARD OF HEARING, ambulates w/ walker, w/ Hx CHFpEF w/ GIIDD, CVA (>15yrs on Plavix), DM (no meds to avoid hypoglycemia), Fe def anemia, possible rectal mass( on prior imaging in March) and HTN admitted for symptomatic anemia.    Symptomatic anemia: p/w fatigue, low hb noted at PCP office  - recent admission CT abd pelvis showing possible rectal mass in March 2022, was not able to follow up outpatient with GI.  - no active s/s of bleeding  - on admission hb 6.8, MCV 87  - 1 unit PRBC in ED, follow post transfusion H and H  - clear liquid diet  - GI consult Dr. Roque    CHF exacerbation: on RA, preserved EF > 58% with GIIDD.  patient ran out of po lasix at home,  CXR same as prior, patient on RA.  - s/p iv lasix 40 mg in ED, continue iv lasix 40 mg od, I and O  - Cardio consulted Dr. Tovar.    Rectal mass: as above    h/o CVA: on plavix , will hold in setting possible GI bleed.    HTN: Hold amlodipine, monitor vitals    SCDs for dvt ppx.

## 2022-05-05 NOTE — PATIENT PROFILE ADULT - FALL HARM RISK - FACTORS NURSING JUDGEMENT
Topical Steroids Counseling: I discussed with the patient that prolonged use of topical steroids can result in the increased appearance of superficial blood vessels (telangiectasias), lightening (hypopigmentation) and thinning of the skin (atrophy).  Discussed the steroids can also cause cataracts and glaucoma.  Patient understands to avoid using high potency steroids in skin folds, the groin or the face.  The patient verbalized understanding of the proper use and possible adverse effects of topical steroids.  All of the patient's questions and concerns were addressed. Detail Level: Zone Yes

## 2022-05-05 NOTE — H&P ADULT - ASSESSMENT
88 yo F, AAOx3 HARD OF HEARING, ambulates w/ walker, w/ Hx CHFpEF w/ GIIDD, CVA (>15yrs on Plavix), DM (no meds to avoid hypoglycemia), Fe def anemia, possible rectal mass and HTN admit for symptomatic anemia 2/2 suspected GIB.

## 2022-05-05 NOTE — H&P ADULT - NSICDXPASTMEDICALHX_GEN_ALL_CORE_FT
PAST MEDICAL HISTORY:  Congestive heart failure (CHF)     Diabetes     High cholesterol     History of hypertension     Iron (Fe) deficiency anemia     Rectal mass     Stroke

## 2022-05-05 NOTE — H&P ADULT - PROBLEM SELECTOR PLAN 2
p/w acute CHF exac triggered by med non-compliance and anemia  CHF w/ preserved EF > 58% with GIIDD  - resume Lasix at 40 IV daily  - resume BP control when hemodynamically stable  CARDIO consult Dr. Tovar

## 2022-05-05 NOTE — CONSULT NOTE ADULT - CONSULT REQUESTED DATE/TIME
05-May-2022 09:39 Was the patient seen in the last year in this department? Yes    Does patient have an active prescription for medications requested? Yes    Received Request Via: Pharmacy     Last visit   2-6-19    Last lab   2-13-19

## 2022-05-05 NOTE — CONSULT NOTE ADULT - ASSESSMENT
88 yo F, AAOx3 HARD OF HEARING, ambulates w/ walker, w/ Hx CHFpEF w/ GIIDD, CVA (>15yrs on Plavix), DM (no meds to avoid hypoglycemia), Fe def anemia, possible rectal mass and HTN who was BIB family for shortness of breath and abnormal labs at PCP office. Pt  admitted for symptomatic anemia 2/2 suspected GIB.

## 2022-05-05 NOTE — PATIENT PROFILE ADULT - FALL HARM RISK - HARM RISK INTERVENTIONS

## 2022-05-05 NOTE — CONSULT NOTE ADULT - SUBJECTIVE AND OBJECTIVE BOX
[facility]    Patient is a 89y old  Female who presents with a chief complaint of symptomatic anemia (05 May 2022 09:39)    HPI:  89 yo F, AAOx3 HARD OF HEARING, ambulates w/ walker, w/ Hx CHFpEF w/ GIIDD, CVA (>15yrs on Plavix), DM (no meds to avoid hypoglycemia), Fe def anemia, possible rectal mass and HTN who was BIB family for shortness of breath and abnormal labs at PCP office. Ms. Tejada was admitted on 3/3 - 3/9 for AHRF 2/2 fluid overload with new diagnosis of CHF and Fe def anemia. CT abd showed possible rectal mass but patient and family deferred to follow up with GI Dr. Roque as inpatient for scope. Since discharge home patient has been generally fatigued, with increased daytime sleepiness and over the last few days she has become increasingly short of breath with increased leg swelling. Also has not been complaint with iron or lasix 40 po daily for 5 days as she ran out of the medications. Was seen by PCP 4 days ago to have labs drawn and was called on 5/4 to come to the hospital for a critical hemoglobin. Ms. Tejada has not yet been able to follow up with cardiology or GI as outpatient due to long waits for appointment availability. (05 May 2022 03:40)    Pt admitted for symptomatic anemia.   GI consulted for further evaluation.     GI HPI:   Pt seen and examined in EDhold. Pt is [place] , primary Language Margi. Margi  # [phone] translated but pt unable to hear. Contacted pt's sons who reports pt has been SOB with exertion, with weight loss. Sons also report that  Pt is able to communicate through writing and understands English.   Pt endorses fatigue, no N/V/D/C, no abdominal discomfort, overt bleeding.  (+) dark stool since starting Ferrous tablet. Last plavix dose Tuesday, 5/3.     PMH/PSH:  PAST MEDICAL & SURGICAL HISTORY:  Diabetes    History of hypertension    Stroke    High cholesterol    Iron (Fe) deficiency anemia    Rectal mass    Congestive heart failure (CHF)      FH:  FAMILY HISTORY:      Social History:   No smoking, no ETOH    MEDS:  MEDICATIONS  (STANDING):  dextrose 5%. 1000 milliLiter(s) (50 mL/Hr) IV Continuous <Continuous>  dextrose 5%. 1000 milliLiter(s) (100 mL/Hr) IV Continuous <Continuous>  dextrose 50% Injectable 25 Gram(s) IV Push once  dextrose 50% Injectable 12.5 Gram(s) IV Push once  dextrose 50% Injectable 25 Gram(s) IV Push once  ferrous    sulfate 325 milliGRAM(s) Oral daily  furosemide   Injectable 40 milliGRAM(s) IV Push daily  glucagon  Injectable 1 milliGRAM(s) IntraMuscular once  insulin lispro (ADMELOG) corrective regimen sliding scale   SubCutaneous three times a day before meals  pantoprazole  Injectable 40 milliGRAM(s) IV Push every 12 hours  simvastatin 10 milliGRAM(s) Oral at bedtime    MEDICATIONS  (PRN):  acetaminophen     Tablet .. 650 milliGRAM(s) Oral every 6 hours PRN Mild Pain (1 - 3), Moderate Pain (4 - 6)  dextrose Oral Gel 15 Gram(s) Oral once PRN Blood Glucose LESS THAN 70 milliGRAM(s)/deciliter  oxycodone    5 mG/acetaminophen 325 mG 1 Tablet(s) Oral every 6 hours PRN Severe Pain (7 - 10)    Allergies    No Known Allergies    Intolerances    -----------  ROS    CONSTITUTIONAL:  (+) fatigue, (+) weight loss. No , fever, chills  HEENT:  Eyes:  No visual loss, blurred vision, double vision or yellow sclerae. Ears, Nose, Throat:  No hearing loss, sneezing, congestion, runny nose or sore throat.  SKIN:  No rash or itching.  CARDIOVASCULAR:  No chest pain, chest pressure or chest discomfort. No palpitations or edema.  RESPIRATORY:  (+) shortness of breath. No , cough or sputum.  GASTROINTESTINAL:  SEE HPI  GENITOURINARY:  No dysuria, hematuria, urinary frequency  NEUROLOGICAL:  No headache, dizziness, syncope, paralysis, ataxia, numbness or tingling in the extremities. No change in bowel or bladder control.  MUSCULOSKELETAL:  No muscle, back pain, joint pain or stiffness.  HEMATOLOGIC:  No anemia, bleeding or bruising.  LYMPHATICS:  No enlarged nodes. No history of splenectomy.  PSYCHIATRIC:  No history of depression or anxiety.  ENDOCRINOLOGIC:  No reports of sweating, cold or heat intolerance. No polyuria or polydipsia.      ______________________________________________________________________  PHYSICAL EXAM:  T(C): 36.4 (05-05-22 @ 07:15), Max: 37.1 (05-04-22 @ 20:23)  HR: 75 (05-05-22 @ 07:15)  BP: 136/69 (05-05-22 @ 07:15)  RR: 18 (05-05-22 @ 07:15)  SpO2: 100% (05-05-22 @ 07:15)  Wt(kg): --    05-04 - 05-05  --------------------------------------------------------  IN:  Total IN: 0 mL    OUT:    Voided (mL): 1200 mL  Total OUT: 1200 mL    Total NET: -1200 mL          GEN: NAD, normocephalic  CVS: S1S2+  CHEST: clear to auscultation  ABD: soft , nontender, nondistended, bowel sounds present  EXTR: no cyanosis, no clubbing, no edema  NEURO: Awake and alert; oriented x3  SKIN:  warm;  non icteric    ______________________________________________________________________  LABS:                        8.4    6.87  )-----------( 415      ( 05 May 2022 06:23 )             25.4     05-05    133<L>  |  103  |  20<H>  ----------------------------<  73  4.2   |  23  |  0.83    Ca    8.9      05 May 2022 06:23  Phos  3.9     05-05  Mg     1.8     05-05    TPro  6.6  /  Alb  3.1<L>  /  TBili  1.1  /  DBili  x   /  AST  15  /  ALT  14  /  AlkPhos  108  05-05    LIVER FUNCTIONS - ( 05 May 2022 06:23 )  Alb: 3.1 g/dL / Pro: 6.6 g/dL / ALK PHOS: 108 U/L / ALT: 14 U/L DA / AST: 15 U/L / GGT: x           PT/INR - ( 05 May 2022 06:23 )   PT: 11.9 sec;   INR: 1.00 ratio         PTT - ( 05 May 2022 06:23 )  PTT:29.8 sec  ____________________________________________    IMAGING:    < from: Xray Chest 1 View- PORTABLE-Urgent (Xray Chest 1 View- PORTABLE-Urgent .) (05.04.22 @ 21:19) >  PROCEDURE DATE:  05/04/2022          INTERPRETATION:  CLINICAL INDICATION: 89 years  Female with edema.    COMPARISON: 3/2/2022    The patient is rotated to the right.    AP view of the chest demonstrates the lungs to be clear. There are trace   bilateral pleural effusions.. There is no pneumothorax.    The heart, mediastinum and hailey cannot be assessed due to rotation.    The pulmonary vasculature is normal.    Mild thoracic degenerative changes and osteopenia are present.    IMPRESSION:    No acute infiltrate. Trace bilateral pleural effusions.    < end of copied text >

## 2022-05-05 NOTE — H&P ADULT - PROBLEM SELECTOR PLAN 1
p/w Hb 6.8, normocytic anemia chronic baseline is 8  s/p 1 unit PRBC in ED w/ Lasix 40 IVP  - f/u FOBT  - CBC q 4  - IV PPI BID  - clear liquid diet  GI consult Dr. Roque

## 2022-05-05 NOTE — H&P ADULT - NSHPLABSRESULTS_GEN_ALL_CORE
LABS:                        6.8    6.18  )-----------( [id number]      ( 04 May 2022 21:11 )             20.9     05-04    131<L>  |  102  |  23<H>  ----------------------------<  95  4.8   |  20<L>  |  1.01    Ca    8.7      04 May 2022 21:11    TPro  6.7  /  Alb  3.0<L>  /  TBili  0.3  /  DBili  x   /  AST  12  /  ALT  14  /  AlkPhos  114  05-04      LIVER FUNCTIONS - ( 04 May 2022 21:11 )  Alb: 3.0 g/dL / Pro: 6.7 g/dL / ALK PHOS: 114 U/L / ALT: 14 U/L DA / AST: 12 U/L / GGT: x

## 2022-05-05 NOTE — H&P ADULT - PROBLEM SELECTOR PLAN 5
Last a1c was 6.3 on admission in march  has history of low BG so does not take her metformin  - ss insulin for now

## 2022-05-05 NOTE — H&P ADULT - PROBLEM SELECTOR PLAN 3
CT in march showed: possible mural thickening of the rectum may be due to   underdistention, proctitis, or rectal cancer  - will need scope  - IV PPI BID (as unknown if truly upper or lower source), more likely lower, BUN 23  - Clears diet  - f/u FOBT

## 2022-05-05 NOTE — H&P ADULT - NSHPREVIEWOFSYSTEMS_GEN_ALL_CORE
REVIEW OF SYSTEMS:  CONSTITUTIONAL: No fever, weight loss, + fatigue  RESPIRATORY: No cough, wheezing, chills or hemoptysis; + shortness of breath  CARDIOVASCULAR: No chest pain, palpitations, dizziness, + leg swelling  GASTROINTESTINAL: No abdominal pain. No nausea, vomiting, or hematemesis; No diarrhea or constipation. No melena or hematochezia.  GENITOURINARY: No dysuria or hematuria, urinary frequency  NEUROLOGICAL: No headaches, memory loss, loss of strength, numbness, or tremors  SKIN: No itching, burning, rashes, or lesions

## 2022-05-05 NOTE — H&P ADULT - NSHPPHYSICALEXAM_GEN_ALL_CORE
PHYSICAL EXAMINATION:  GENERAL: NAD, obese, on RA no distress  HEAD:  Atraumatic, Normocephalic  EYES:  conjunctiva and sclera clear  NECK: Supple, No JVD  CHEST/LUNG: limited exam - faint crackles at b/l lung bases. No rhonchi, wheezing  HEART: Regular rate and rhythm; No murmurs, rubs, or gallops  ABDOMEN: Soft, Nontender, Nondistended; Bowel sounds present  NERVOUS SYSTEM:  Alert & Oriented X3,    EXTREMITIES:  2+ Peripheral Pulses, No clubbing, cyanosis, + 2 b/l LE edema to mid calf  SKIN: warm dry

## 2022-05-06 LAB
ANION GAP SERPL CALC-SCNC: 9 MMOL/L — SIGNIFICANT CHANGE UP (ref 5–17)
BUN SERPL-MCNC: 16 MG/DL — SIGNIFICANT CHANGE UP (ref 7–18)
CALCIUM SERPL-MCNC: 9 MG/DL — SIGNIFICANT CHANGE UP (ref 8.4–10.5)
CHLORIDE SERPL-SCNC: 100 MMOL/L — SIGNIFICANT CHANGE UP (ref 96–108)
CO2 SERPL-SCNC: 25 MMOL/L — SIGNIFICANT CHANGE UP (ref 22–31)
CREAT SERPL-MCNC: 0.86 MG/DL — SIGNIFICANT CHANGE UP (ref 0.5–1.3)
EGFR: 65 ML/MIN/1.73M2 — SIGNIFICANT CHANGE UP
GLUCOSE BLDC GLUCOMTR-MCNC: 111 MG/DL — HIGH (ref 70–99)
GLUCOSE BLDC GLUCOMTR-MCNC: 142 MG/DL — HIGH (ref 70–99)
GLUCOSE BLDC GLUCOMTR-MCNC: 95 MG/DL — SIGNIFICANT CHANGE UP (ref 70–99)
GLUCOSE BLDC GLUCOMTR-MCNC: 97 MG/DL — SIGNIFICANT CHANGE UP (ref 70–99)
GLUCOSE SERPL-MCNC: 86 MG/DL — SIGNIFICANT CHANGE UP (ref 70–99)
HCT VFR BLD CALC: 27.2 % — LOW (ref 34.5–45)
HGB BLD-MCNC: 9.1 G/DL — LOW (ref 11.5–15.5)
MCHC RBC-ENTMCNC: 28.7 PG — SIGNIFICANT CHANGE UP (ref 27–34)
MCHC RBC-ENTMCNC: 33.5 GM/DL — SIGNIFICANT CHANGE UP (ref 32–36)
MCV RBC AUTO: 85.8 FL — SIGNIFICANT CHANGE UP (ref 80–100)
NRBC # BLD: 0 /100 WBCS — SIGNIFICANT CHANGE UP (ref 0–0)
PLATELET # BLD AUTO: 456 K/UL — HIGH (ref 150–400)
POTASSIUM SERPL-MCNC: 4.2 MMOL/L — SIGNIFICANT CHANGE UP (ref 3.5–5.3)
POTASSIUM SERPL-SCNC: 4.2 MMOL/L — SIGNIFICANT CHANGE UP (ref 3.5–5.3)
RBC # BLD: 3.17 M/UL — LOW (ref 3.8–5.2)
RBC # FLD: 14.1 % — SIGNIFICANT CHANGE UP (ref 10.3–14.5)
SODIUM SERPL-SCNC: 134 MMOL/L — LOW (ref 135–145)
WBC # BLD: 6.09 K/UL — SIGNIFICANT CHANGE UP (ref 3.8–10.5)
WBC # FLD AUTO: 6.09 K/UL — SIGNIFICANT CHANGE UP (ref 3.8–10.5)

## 2022-05-06 PROCEDURE — 99233 SBSQ HOSP IP/OBS HIGH 50: CPT | Mod: GC

## 2022-05-06 PROCEDURE — 71045 X-RAY EXAM CHEST 1 VIEW: CPT | Mod: 26

## 2022-05-06 PROCEDURE — 99232 SBSQ HOSP IP/OBS MODERATE 35: CPT

## 2022-05-06 RX ORDER — FUROSEMIDE 40 MG
20 TABLET ORAL DAILY
Refills: 0 | Status: DISCONTINUED | OUTPATIENT
Start: 2022-05-07 | End: 2022-05-08

## 2022-05-06 RX ORDER — SOD SULF/SODIUM/NAHCO3/KCL/PEG
4000 SOLUTION, RECONSTITUTED, ORAL ORAL ONCE
Refills: 0 | Status: DISCONTINUED | OUTPATIENT
Start: 2022-05-08 | End: 2022-05-08

## 2022-05-06 RX ORDER — MULTIVIT WITH MIN/MFOLATE/K2 340-15/3 G
1 POWDER (GRAM) ORAL ONCE
Refills: 0 | Status: DISCONTINUED | OUTPATIENT
Start: 2022-05-07 | End: 2022-05-07

## 2022-05-06 RX ORDER — ONDANSETRON 8 MG/1
4 TABLET, FILM COATED ORAL ONCE
Refills: 0 | Status: COMPLETED | OUTPATIENT
Start: 2022-05-06 | End: 2022-05-06

## 2022-05-06 RX ADMIN — Medication 650 MILLIGRAM(S): at 00:26

## 2022-05-06 RX ADMIN — SIMVASTATIN 10 MILLIGRAM(S): 20 TABLET, FILM COATED ORAL at 22:13

## 2022-05-06 RX ADMIN — PANTOPRAZOLE SODIUM 40 MILLIGRAM(S): 20 TABLET, DELAYED RELEASE ORAL at 17:54

## 2022-05-06 RX ADMIN — Medication 40 MILLIGRAM(S): at 05:28

## 2022-05-06 RX ADMIN — ONDANSETRON 4 MILLIGRAM(S): 8 TABLET, FILM COATED ORAL at 09:01

## 2022-05-06 RX ADMIN — Medication 650 MILLIGRAM(S): at 01:00

## 2022-05-06 RX ADMIN — Medication 650 MILLIGRAM(S): at 10:20

## 2022-05-06 RX ADMIN — Medication 650 MILLIGRAM(S): at 19:30

## 2022-05-06 RX ADMIN — PANTOPRAZOLE SODIUM 40 MILLIGRAM(S): 20 TABLET, DELAYED RELEASE ORAL at 05:29

## 2022-05-06 RX ADMIN — Medication 650 MILLIGRAM(S): at 20:20

## 2022-05-06 RX ADMIN — Medication 325 MILLIGRAM(S): at 12:57

## 2022-05-06 RX ADMIN — Medication 650 MILLIGRAM(S): at 09:54

## 2022-05-06 NOTE — PROGRESS NOTE ADULT - SUBJECTIVE AND OBJECTIVE BOX
GI PROGRESS NOTE    Patient is a 89y old  Female who presents with a chief complaint of symptomatic anemia (06 May 2022 07:41)      HPI:  89 yo F, AAOx3 HARD OF HEARING, ambulates w/ walker, w/ Hx CHFpEF w/ GIIDD, CVA (>15yrs on Plavix), DM (no meds to avoid hypoglycemia), Fe def anemia, possible rectal mass and HTN who was BIB family for shortness of breath and abnormal labs at PCP office. Ms. Tejada was admitted on 3/3 - 3/9 for AHRF 2/2 fluid overload with new diagnosis of CHF and Fe def anemia. CT abd showed possible rectal mass but patient and family deferred to follow up with GI Dr. Roque as inpatient for scope. Since discharge home patient has been generally fatigued, with increased daytime sleepiness and over the last few days she has become increasingly short of breath with increased leg swelling. Also has not been complaint with iron or lasix 40 po daily for 5 days as she ran out of the medications. Was seen by PCP 4 days ago to have labs drawn and was called on 5/4 to come to the hospital for a critical hemoglobin. Ms. Tejada has not yet been able to follow up with cardiology or GI as outpatient due to long waits for appointment availability. (05 May 2022 03:40)      GI HPI  Patient resting in bed. Denies any abdominal pain, N&V or BM. C/o headache and being hungry.     ______________________________________________________________________  PMH/PSH:  PAST MEDICAL & SURGICAL HISTORY:  Diabetes    History of hypertension    Stroke    High cholesterol    Iron (Fe) deficiency anemia    Rectal mass    Congestive heart failure (CHF)      ______________________________________________________________________  MEDS:  MEDICATIONS  (STANDING):  dextrose 5%. 1000 milliLiter(s) (50 mL/Hr) IV Continuous <Continuous>  dextrose 5%. 1000 milliLiter(s) (100 mL/Hr) IV Continuous <Continuous>  dextrose 50% Injectable 25 Gram(s) IV Push once  dextrose 50% Injectable 12.5 Gram(s) IV Push once  dextrose 50% Injectable 25 Gram(s) IV Push once  ferrous    sulfate 325 milliGRAM(s) Oral daily  glucagon  Injectable 1 milliGRAM(s) IntraMuscular once  insulin lispro (ADMELOG) corrective regimen sliding scale   SubCutaneous three times a day before meals  pantoprazole  Injectable 40 milliGRAM(s) IV Push every 12 hours  simvastatin 10 milliGRAM(s) Oral at bedtime    MEDICATIONS  (PRN):  acetaminophen     Tablet .. 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    HEENT:  Eyes:  No visual loss, blurred vision, double vision or yellow sclerae. Ears, Nose, Throat:  No hearing loss, sneezing, congestion, runny nose or sore throat.    SKIN:  No rash or itching.    CARDIOVASCULAR:  No chest pain, chest pressure or chest discomfort. No palpitations or edema.    RESPIRATORY:  No shortness of breath, cough or sputum.    GASTROINTESTINAL:  SEE HPI    GENITOURINARY:  No dysuria, hematuria, urinary frequency    NEUROLOGICAL:  No headache, dizziness, syncope, paralysis, ataxia, numbness or tingling in the extremities. No change in bowel or bladder control.    MUSCULOSKELETAL:  No muscle, back pain, joint pain or stiffness.      ______________________________________________________________________  PHYSICAL EXAM:  T(C): 36.4 (05-06-22 @ 07:41), Max: 36.7 (05-06-22 @ 05:28)  HR: 61 (05-06-22 @ 07:41)  BP: 129/45 (05-06-22 @ 07:41)  RR: 19 (05-06-22 @ 07:41)  SpO2: 99% (05-06-22 @ 07:41)  Wt(kg): --    05-05 - 05-06  --------------------------------------------------------  IN:  Total IN: 0 mL    OUT:    Indwelling Catheter - Urethral (mL): 2030 mL  Total OUT: 2030 mL    Total NET: -2030 mL          GEN: NAD   CVS- S1 S2  ABD: soft nontender, non distended, bowel sounds+  LUNGS: clear to auscultation  NEURO: noin focal neuro exam; AAO x 3  Extremities: no cyanosis, no calf tenderness, no edema, no clubbing      ______________________________________________________________________  LABS:                        9.1    6.09  )-----------( 456      ( 06 May 2022 07:19 )             27.2     05-06    134<L>  |  100  |  16  ----------------------------<  86  4.2   |  25  |  0.86    Ca    9.0      06 May 2022 07:19  Phos  3.9     05-05  Mg     1.8     05-05    TPro  6.6  /  Alb  3.1<L>  /  TBili  1.1  /  DBili  x   /  AST  15  /  ALT  14  /  AlkPhos  108  05-05    LIVER FUNCTIONS - ( 05 May 2022 06:23 )  Alb: 3.1 g/dL / Pro: 6.6 g/dL / ALK PHOS: 108 U/L / ALT: 14 U/L DA / AST: 15 U/L / GGT: x           ______________________________________________________________________  IMAGING:    ______________________________________________________________________  < from: CT Abdomen and Pelvis w/ Oral Cont (03.06.22 @ 21:21) >    ACC: [id number] EXAM:  CT ABDOMEN AND PELVIS OC                          *** ADDENDUM***    Upon further review, there 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    --- End of Report ---    *** END OF ADDENDUM***      PROCEDURE DATE:  03/06/2022          INTERPRETATION:  CLINICAL INFORMATION: Low-grade small bowel obstruction    COMPARISON: 3/5/2022    CONTRAST/COMPLICATIONS:  IV Contrast: NONE  Oral Contrast: Omnipaque 300  Complications: None reported at time of study completion    PROCEDURE:  CT of the Abdomen and Pelvis was performed.  Sagittal and coronal reformats were performed.    FINDINGS:  LOWER CHEST: Mild bilateral pleural effusions, grossly unchanged on the   right and slightly decreased on the left. Mild bilateral atelectasis.    LIVER: Within normal limits.  BILE DUCTS: Normal caliber.  GALLBLADDER: Cholelithiasis. No evidence for thickened gallbladder wall   or pericholecystic fluid.  SPLEEN: Within normal limits.  PANCREAS: Within normal limits.  ADRENALS: Within normal limits.  KIDNEYS/URETERS: Stable hypodense lesions in the right kidney,   representing probable cysts. No evidence for a calculus in the kidneys or   ureters. No hydronephrosis.    BLADDER: Stable small air in the urinary bladder may be due to prior   Fernando catheter placement or cystitis. Clinical correlation is recommended.  REPRODUCTIVE ORGANS: Mild fullness of the posterior lower uterine segment   may be due to a fibroid. The adnexa appear grossly unremarkable.    BOWEL: No bowel obstruction. Colonic diverticulosis without evidence of   diverticulitis. Appendix not visualized. No secondary signs for acute   appendicitis. Possible mural thickening of the rectum may be due to   underdistention, proctitis, or rectal cancer. Clinical correlation is   recommended.  PERITONEUM: No free air or ascites.  VESSELS: Calcified atherosclerotic disease.  RETROPERITONEUM/LYMPH NODES: No lymphadenopathy.  ABDOMINAL WALL: Stable fat-containing ventral hernia.  BONES: Degenerative spondylosis. Grade 1 anterolisthesis at L4-5.    IMPRESSION:  No bowel obstruction. Colonic diverticulosis without evidence of   diverticulitis. Appendix not visualized. No secondary signs for acute   appendicitis. Possible mural thickening of the rectum may be due to   underdistention, proctitis, or rectal cancer. Clinical correlation is   recommended.    Stable small air in the urinary bladder may be due to prior Fernando   catheter placement or cystitis. Clinical correlation is recommended.    Mild bilateral pleural effusions.    A preliminary report was provided by DYLLAN NATION MD    --- End of Report ---    < end of copied text >

## 2022-05-06 NOTE — PROGRESS NOTE ADULT - PROBLEM SELECTOR PLAN 1
p/w Hgb 6.8  Improved to 8.4 after 1 unit PRBC. Today HH 9.1/27, trending up.   c/w PPI  c/w Iron supplement  Last plavix on Tuesday, 5/3.  Upper endoscopy /colonoscopy early next week given recent plavix use   Hold plavix x 5 days prior to procedure if safe to do so  COVID test within 72 hrs of procedure.  trend cbc CT from March 2022 showed a questionable mural thickening of the rectum may be due to underdistention, proctitis, or rectal cancer.   p/w Hgb 6.8  Improved to 8.4 after 1 unit PRBC. Today HH 9.1/27, trending up.   c/w PPI  c/w Iron supplement  Last plavix on Tuesday, 5/3.  Upper endoscopy /colonoscopy early next week given recent plavix use, further evaluate anemia due to difficulty with outpatient f/u.  Hold plavix x 5 days prior to procedure if safe to do so  COVID test within 72 hrs of procedure.  trend cbc

## 2022-05-06 NOTE — PROGRESS NOTE ADULT - NS ATTEND AMEND GEN_ALL_CORE FT
- Symptomatic anemia.  - Rectal thickening.    Patient doing well today. Hb with appropriate respones to 1U PRBC. VSS. No overt bleeding noted. Abd soft, NTND. Plan for EGD and colonoscopy Monday. Clear liquid diet Sunday, Golytely 4L sunday evening and NPO after midnight.

## 2022-05-06 NOTE — PROGRESS NOTE ADULT - PROBLEM SELECTOR PLAN 1
p/w Hb 6.8, normocytic anemia chronic baseline is 8  s/p 1 unit PRBC in ED w/ Lasix 40 IVP  -FOBT negative   - IV PPI BID  - clear liquid diet advanced to regular  - GI consult Dr. Roque noted for Colonoscopy/ EGD on 5/9  - COVID ordered for 5/8 and preop labs and type and screen  - Golytely as per GI, patient might need two day prep clear liquid both saturday and sunday   - Magnesium Citrate as per GI saturday  - monitor Hgb p/w Hb 6.8, normocytic anemia chronic baseline is 8  s/p 1 unit PRBC in ED w/ Lasix 40 IVP  - IV PPI BID  - clear liquid diet advanced to regular  - GI consult Dr. Roque noted for Colonoscopy/ EGD on 5/9  - COVID ordered for 5/8 and preop labs and type and screen  - Golytely as per GI, patient might need two day prep clear liquid both saturday and sunday   - Magnesium Citrate as per GI saturday  - monitor Hgb

## 2022-05-06 NOTE — PROGRESS NOTE ADULT - ASSESSMENT
1. Stool studies  2. EGD/colonoscopy - labs during scope   3. Probiotic, limit sugar   88 yo F, AAOx3 HARD OF HEARING, ambulates w/ walker, w/ Hx CHFpEF w/ GIIDD, CVA (>15yrs on Plavix), DM (no meds to avoid hypoglycemia), Fe def anemia, possible rectal mass and HTN admit for symptomatic anemia 2/2 suspected GIB.

## 2022-05-06 NOTE — PROGRESS NOTE ADULT - SUBJECTIVE AND OBJECTIVE BOX
PGY1 Progress Note discussed with attending     PAGER #: [[phone]] TILL 5:00 PM  PLEASE CONTACT ON CALL TEAM:  - On Call Team (Please refer to Joe) FROM 5:00 PM - 8:30PM  - Nightfloat Team FROM 8:30 -7:30 AM    CHIEF COMPLAINT & BRIEF HOSPITAL COURSE:    INTERVAL HPI/OVERNIGHT EVENTS:       REVIEW OF SYSTEMS:  CONSTITUTIONAL: No fever, weight loss, or fatigue  RESPIRATORY: No cough, wheezing, chills or hemoptysis; No shortness of breath  CARDIOVASCULAR: No chest pain, palpitations, dizziness, or leg swelling  GASTROINTESTINAL: No abdominal pain. No nausea, vomiting, or hematemesis; No diarrhea or constipation. No melena or hematochezia.  GENITOURINARY: No dysuria or hematuria, urinary frequency  NEUROLOGICAL: No headaches, memory loss, loss of strength, numbness, or tremors  SKIN: No itching, burning, rashes, or lesions   MEDICATIONS  (STANDING):  dextrose 5%. 1000 milliLiter(s) (50 mL/Hr) IV Continuous <Continuous>  dextrose 5%. 1000 milliLiter(s) (100 mL/Hr) IV Continuous <Continuous>  dextrose 50% Injectable 25 Gram(s) IV Push once  dextrose 50% Injectable 12.5 Gram(s) IV Push once  dextrose 50% Injectable 25 Gram(s) IV Push once  ferrous    sulfate 325 milliGRAM(s) Oral daily  furosemide   Injectable 40 milliGRAM(s) IV Push daily  glucagon  Injectable 1 milliGRAM(s) IntraMuscular once  insulin lispro (ADMELOG) corrective regimen sliding scale   SubCutaneous three times a day before meals  pantoprazole  Injectable 40 milliGRAM(s) IV Push every 12 hours  simvastatin 10 milliGRAM(s) Oral at bedtime    MEDICATIONS  (PRN):  acetaminophen     Tablet .. 650 milliGRAM(s) Oral every 6 hours PRN Mild Pain (1 - 3), Moderate Pain (4 - 6)  dextrose Oral Gel 15 Gram(s) Oral once PRN Blood Glucose LESS THAN 70 milliGRAM(s)/deciliter  oxycodone    5 mG/acetaminophen 325 mG 1 Tablet(s) Oral every 6 hours PRN Severe Pain (7 - 10)    Vital Signs Last 24 Hrs  T(C): 36.7 (06 May 2022 05:28), Max: 36.7 (06 May 2022 05:28)  T(F): 98.1 (06 May 2022 05:28), Max: 98.1 (06 May 2022 05:28)  HR: 62 (06 May 2022 05:28) (62 - 72)  BP: 114/70 (06 May 2022 05:28) (114/70 - 145/71)  BP(mean): 96 (05 May 2022 19:30) (87 - 96)  RR: 18 (06 May 2022 05:28) (17 - 18)  SpO2: 98% (06 May 2022 05:28) (98% - 99%)    PHYSICAL EXAMINATION:  GENERAL: NAD, well built  HEAD:  Atraumatic, Normocephalic  EYES:  conjunctiva and sclera clear  NECK: Supple, No JVD, Normal thyroid  CHEST/LUNG: Clear to auscultation. Clear to percussion bilaterally; No rales, rhonchi, wheezing, or rubs  HEART: Regular rate and rhythm; No murmurs, rubs, or gallops  ABDOMEN: Soft, Nontender, Nondistended; Bowel sounds present  NERVOUS SYSTEM:  Alert & Oriented X3,    EXTREMITIES:  2+ Peripheral Pulses, No clubbing, cyanosis, or edema  SKIN: warm dry                          9.1    6.09  )-----------( 456      ( 06 May 2022 07:19 )             27.2     05-05    133<L>  |  103  |  20<H>  ----------------------------<  73  4.2   |  23  |  0.83    Ca    8.9      05 May 2022 06:23  Phos  3.9     05-05  Mg     1.8     05-05    TPro  6.6  /  Alb  3.1<L>  /  TBili  1.1  /  DBili  x   /  AST  15  /  ALT  14  /  AlkPhos  108  05-05    LIVER FUNCTIONS - ( 05 May 2022 06:23 )  Alb: 3.1 g/dL / Pro: 6.6 g/dL / ALK PHOS: 108 U/L / ALT: 14 U/L DA / AST: 15 U/L / GGT: x               PT/INR - ( 05 May 2022 06:23 )   PT: 11.9 sec;   INR: 1.00 ratio         PTT - ( 05 May 2022 06:23 )  PTT:29.8 sec    CAPILLARY BLOOD GLUCOSE      RADIOLOGY & ADDITIONAL TESTS:                   PGY1 Progress Note discussed with attending     PAGER #: [[phone]] TILL 5:00 PM  PLEASE CONTACT ON CALL TEAM:  - On Call Team (Please refer to Joe) FROM 5:00 PM - 8:30PM  - Nightfloat Team FROM 8:30 -7:30 AM    CHIEF COMPLAINT & BRIEF HOSPITAL COURSE:    INTERVAL HPI/OVERNIGHT EVENTS: No acute events noted overnight. Patient denies chest pain, sob or palpitations. Denies abdominal pain, melena or hematochezia. Hgb noted to be 9.1. Spoke to patient's son Charlie at [phone] and updated him on possible GI intervention on monday 5/9. All questions answered.       REVIEW OF SYSTEMS:  CONSTITUTIONAL: No fever, weight loss, or fatigue  RESPIRATORY: No cough, wheezing, chills or hemoptysis; No shortness of breath  CARDIOVASCULAR: No chest pain, palpitations, dizziness, or leg swelling  GASTROINTESTINAL: No abdominal pain. No nausea, vomiting, or hematemesis; No diarrhea or constipation. No melena or hematochezia.  GENITOURINARY: No dysuria or hematuria, urinary frequency  NEUROLOGICAL: No headaches, memory loss, loss of strength, numbness, or tremors  SKIN: No itching, burning, rashes, or lesions   MEDICATIONS  (STANDING):  dextrose 5%. 1000 milliLiter(s) (50 mL/Hr) IV Continuous <Continuous>  dextrose 5%. 1000 milliLiter(s) (100 mL/Hr) IV Continuous <Continuous>  dextrose 50% Injectable 25 Gram(s) IV Push once  dextrose 50% Injectable 12.5 Gram(s) IV Push once  dextrose 50% Injectable 25 Gram(s) IV Push once  ferrous    sulfate 325 milliGRAM(s) Oral daily  furosemide   Injectable 40 milliGRAM(s) IV Push daily  glucagon  Injectable 1 milliGRAM(s) IntraMuscular once  insulin lispro (ADMELOG) corrective regimen sliding scale   SubCutaneous three times a day before meals  pantoprazole  Injectable 40 milliGRAM(s) IV Push every 12 hours  simvastatin 10 milliGRAM(s) Oral at bedtime    MEDICATIONS  (PRN):  acetaminophen     Tablet .. 650 milliGRAM(s) Oral every 6 hours PRN Mild Pain (1 - 3), Moderate Pain (4 - 6)  dextrose Oral Gel 15 Gram(s) Oral once PRN Blood Glucose LESS THAN 70 milliGRAM(s)/deciliter  oxycodone    5 mG/acetaminophen 325 mG 1 Tablet(s) Oral every 6 hours PRN Severe Pain (7 - 10)    Vital Signs Last 24 Hrs  T(C): 36.7 (06 May 2022 05:28), Max: 36.7 (06 May 2022 05:28)  T(F): 98.1 (06 May 2022 05:28), Max: 98.1 (06 May 2022 05:28)  HR: 62 (06 May 2022 05:28) (62 - 72)  BP: 114/70 (06 May 2022 05:28) (114/70 - 145/71)  BP(mean): 96 (05 May 2022 19:30) (87 - 96)  RR: 18 (06 May 2022 05:28) (17 - 18)  SpO2: 98% (06 May 2022 05:28) (98% - 99%)    PHYSICAL EXAMINATION:  GENERAL: NAD, obese, on RA no distress  HEAD:  Atraumatic, Normocephalic  EYES:  conjunctiva and sclera clear  NECK: Supple, No JVD  CHEST/LUNG: No wheezes or crackles. No rhonchi, wheezing  HEART: Regular rate and rhythm; No murmurs, rubs, or gallops  ABDOMEN: Soft, Nontender, Nondistended; Bowel sounds present  NERVOUS SYSTEM:  Alert & Oriented X3,    EXTREMITIES:  2+ Peripheral Pulses, No clubbing, cyanosis, + 1b/l LE edema to mid calf  SKIN: warm dry                          9.1    6.09  )-----------( 456      ( 06 May 2022 07:19 )             27.2     05-05    133<L>  |  103  |  20<H>  ----------------------------<  73  4.2   |  23  |  0.83    Ca    8.9      05 May 2022 06:23  Phos  3.9     05-05  Mg     1.8     05-05    TPro  6.6  /  Alb  3.1<L>  /  TBili  1.1  /  DBili  x   /  AST  15  /  ALT  14  /  AlkPhos  108  05-05    LIVER FUNCTIONS - ( 05 May 2022 06:23 )  Alb: 3.1 g/dL / Pro: 6.6 g/dL / ALK PHOS: 108 U/L / ALT: 14 U/L DA / AST: 15 U/L / GGT: x               PT/INR - ( 05 May 2022 06:23 )   PT: 11.9 sec;   INR: 1.00 ratio         PTT - ( 05 May 2022 06:23 )  PTT:29.8 sec    CAPILLARY BLOOD GLUCOSE      RADIOLOGY & ADDITIONAL TESTS:

## 2022-05-06 NOTE — PROGRESS NOTE ADULT - PROBLEM SELECTOR PLAN 3
CT in march showed: possible mural thickening of the rectum may be due to   underdistention, proctitis, or rectal cancer  - will need scope  - IV PPI BID (as unknown if truly upper or lower source), more likely lower, BUN 23  - FOBT normal  - monitor Hgb   - GI consult noted CT in march showed: possible mural thickening of the rectum may be due to   underdistention, proctitis, or rectal cancer  - will need scope  - IV PPI BID (as unknown if truly upper or lower source), more likely lower, BUN 23  - monitor Hgb   - GI consult noted

## 2022-05-06 NOTE — PROGRESS NOTE ADULT - ATTENDING COMMENTS
Patient seen and examined earlier this evening    Vital Signs Last 24 Hrs  T(C): 36.6 (06 May 2022 15:50), Max: 36.7 (06 May 2022 05:28)  T(F): 97.9 (06 May 2022 15:50), Max: 98.1 (06 May 2022 05:28)  HR: 60 (06 May 2022 15:50) (60 - 66)  BP: 134/67 (06 May 2022 15:50) (114/70 - 145/71)  BP(mean): 89 (06 May 2022 15:50) (89 - 96)  RR: 18 (06 May 2022 15:50) (17 - 19)  SpO2: 98% (06 May 2022 15:50) (98% - 99%)    Labs:                        9.1    6.09  )-----------( 456      ( 06 May 2022 07:19 )             27.2   05-06    134<L>  |  100  |  16  ----------------------------<  86  4.2   |  25  |  0.86    Ca    9.0      06 May 2022 07:19  Phos  3.9     05-05  Mg     1.8     05-05    TPro  6.6  /  Alb  3.1<L>  /  TBili  1.1  /  DBili  x   /  AST  15  /  ALT  14  /  AlkPhos  108  05-05    Plan:  Soft diet  GI consult;<EGD/Colon early next wek as Patient on Plavix  Spoke with Son Santi [phone] over the phone and updated presentation, current clinical status. Patient seen and examined earlier this evening    Patient admitted with shortness of breath noted to have symptomatic anemia s/p one unit PRBC with stable H/H; Patient scheduled for EGD/ Colonoscopy Monday as patient was on Plavix (Hx CVA) on hold.     Patient feels much better today; sitting up at edge of bed. would want to defer endoscopy if not needed. Spoke with GI given her prior CT with thickening as well as reported dark stools although on Ferrous tabs as well as risk of procedure with advanced age to be attempted outpatient.   denies fever, chills, SOB, palpitations, chest pain, nausea, vomiting, diarrhea, constipation, dizziness    Vital Signs Last 24 Hrs  T(C): 36.6 (06 May 2022 15:50), Max: 36.7 (06 May 2022 05:28)  T(F): 97.9 (06 May 2022 15:50), Max: 98.1 (06 May 2022 05:28)  HR: 60 (06 May 2022 15:50) (60 - 66)  BP: 134/67 (06 May 2022 15:50) (114/70 - 145/71)  BP(mean): 89 (06 May 2022 15:50) (89 - 96)  RR: 18 (06 May 2022 15:50) (17 - 19)  SpO2: 98% (06 May 2022 15:50) (98% - 99%)    Labs:                      9.1    6.09  )-----------( 456      ( 06 May 2022 07:19 )             27.2   05-06  134<L>  |  100  |  16  ----------------------------<  86  4.2   |  25  |  0.86    Ca    9.0      06 May 2022 07:19  Phos  3.9     05-05  Mg     1.8     05-05  TPro  6.6  /  Alb  3.1<L>  /  TBili  1.1  /  DBili  x   /  AST  15  /  ALT  14  /  AlkPhos  108  05-05    D/D:  Symptomatic anemia s/p PRBC H/H stable  suspect upper GI bleed possibly induced by Plavix  Hyponatrmeia on admission likely hypovolemic reslved  Hx HFpEF stable    Plan:  Soft diet  GI consult and f/u appreciated; d/w Dr. Roque; EGD/Colon Monday scheduled  Patient requested regular food and not low salt  Spoke with Son Santi [phone] over the phone and updated presentation, current clinical status.  Agree with procedure; patient also in agreement  Bowel prep as per GI; Switch to full liquid diet after lunch tomorrow.  Rest as per PGY1 above; d/w PGY1 Dr. Díaz and RN

## 2022-05-07 DIAGNOSIS — G62.9 POLYNEUROPATHY, UNSPECIFIED: ICD-10-CM

## 2022-05-07 DIAGNOSIS — M79.2 NEURALGIA AND NEURITIS, UNSPECIFIED: ICD-10-CM

## 2022-05-07 DIAGNOSIS — G44.40 DRUG-INDUCED HEADACHE, NOT ELSEWHERE CLASSIFIED, NOT INTRACTABLE: ICD-10-CM

## 2022-05-07 LAB
ALBUMIN SERPL ELPH-MCNC: 3.3 G/DL — LOW (ref 3.5–5)
ALP SERPL-CCNC: 103 U/L — SIGNIFICANT CHANGE UP (ref 40–120)
ALT FLD-CCNC: 13 U/L DA — SIGNIFICANT CHANGE UP (ref 10–60)
ANION GAP SERPL CALC-SCNC: 5 MMOL/L — SIGNIFICANT CHANGE UP (ref 5–17)
AST SERPL-CCNC: 15 U/L — SIGNIFICANT CHANGE UP (ref 10–40)
BILIRUB SERPL-MCNC: 0.7 MG/DL — SIGNIFICANT CHANGE UP (ref 0.2–1.2)
BUN SERPL-MCNC: 20 MG/DL — HIGH (ref 7–18)
CALCIUM SERPL-MCNC: 8.8 MG/DL — SIGNIFICANT CHANGE UP (ref 8.4–10.5)
CHLORIDE SERPL-SCNC: 98 MMOL/L — SIGNIFICANT CHANGE UP (ref 96–108)
CO2 SERPL-SCNC: 28 MMOL/L — SIGNIFICANT CHANGE UP (ref 22–31)
CREAT SERPL-MCNC: 1.16 MG/DL — SIGNIFICANT CHANGE UP (ref 0.5–1.3)
EGFR: 45 ML/MIN/1.73M2 — LOW
GLUCOSE BLDC GLUCOMTR-MCNC: 101 MG/DL — HIGH (ref 70–99)
GLUCOSE BLDC GLUCOMTR-MCNC: 110 MG/DL — HIGH (ref 70–99)
GLUCOSE BLDC GLUCOMTR-MCNC: 110 MG/DL — HIGH (ref 70–99)
GLUCOSE BLDC GLUCOMTR-MCNC: 119 MG/DL — HIGH (ref 70–99)
GLUCOSE SERPL-MCNC: 114 MG/DL — HIGH (ref 70–99)
HCT VFR BLD CALC: 29.5 % — LOW (ref 34.5–45)
HGB BLD-MCNC: 9.6 G/DL — LOW (ref 11.5–15.5)
MAGNESIUM SERPL-MCNC: 1.8 MG/DL — SIGNIFICANT CHANGE UP (ref 1.6–2.6)
MCHC RBC-ENTMCNC: 28.5 PG — SIGNIFICANT CHANGE UP (ref 27–34)
MCHC RBC-ENTMCNC: 32.5 GM/DL — SIGNIFICANT CHANGE UP (ref 32–36)
MCV RBC AUTO: 87.5 FL — SIGNIFICANT CHANGE UP (ref 80–100)
NRBC # BLD: 0 /100 WBCS — SIGNIFICANT CHANGE UP (ref 0–0)
PHOSPHATE SERPL-MCNC: 4.1 MG/DL — SIGNIFICANT CHANGE UP (ref 2.5–4.5)
PLATELET # BLD AUTO: 468 K/UL — HIGH (ref 150–400)
POTASSIUM SERPL-MCNC: 4.8 MMOL/L — SIGNIFICANT CHANGE UP (ref 3.5–5.3)
POTASSIUM SERPL-SCNC: 4.8 MMOL/L — SIGNIFICANT CHANGE UP (ref 3.5–5.3)
PROT SERPL-MCNC: 7 G/DL — SIGNIFICANT CHANGE UP (ref 6–8.3)
RBC # BLD: 3.37 M/UL — LOW (ref 3.8–5.2)
RBC # FLD: 14.6 % — HIGH (ref 10.3–14.5)
SODIUM SERPL-SCNC: 131 MMOL/L — LOW (ref 135–145)
WBC # BLD: 6.37 K/UL — SIGNIFICANT CHANGE UP (ref 3.8–10.5)
WBC # FLD AUTO: 6.37 K/UL — SIGNIFICANT CHANGE UP (ref 3.8–10.5)

## 2022-05-07 PROCEDURE — 99233 SBSQ HOSP IP/OBS HIGH 50: CPT | Mod: GC

## 2022-05-07 RX ORDER — MULTIVIT WITH MIN/MFOLATE/K2 340-15/3 G
1 POWDER (GRAM) ORAL ONCE
Refills: 0 | Status: COMPLETED | OUTPATIENT
Start: 2022-05-07 | End: 2022-05-07

## 2022-05-07 RX ORDER — GABAPENTIN 400 MG/1
200 CAPSULE ORAL AT BEDTIME
Refills: 0 | Status: DISCONTINUED | OUTPATIENT
Start: 2022-05-07 | End: 2022-05-09

## 2022-05-07 RX ORDER — ACETAMINOPHEN 500 MG
1000 TABLET ORAL ONCE
Refills: 0 | Status: COMPLETED | OUTPATIENT
Start: 2022-05-07 | End: 2022-05-07

## 2022-05-07 RX ADMIN — SIMVASTATIN 10 MILLIGRAM(S): 20 TABLET, FILM COATED ORAL at 22:01

## 2022-05-07 RX ADMIN — Medication 650 MILLIGRAM(S): at 22:40

## 2022-05-07 RX ADMIN — Medication 650 MILLIGRAM(S): at 16:09

## 2022-05-07 RX ADMIN — Medication 650 MILLIGRAM(S): at 00:31

## 2022-05-07 RX ADMIN — Medication 650 MILLIGRAM(S): at 06:08

## 2022-05-07 RX ADMIN — Medication 650 MILLIGRAM(S): at 07:00

## 2022-05-07 RX ADMIN — Medication 400 MILLIGRAM(S): at 11:56

## 2022-05-07 RX ADMIN — Medication 20 MILLIGRAM(S): at 06:09

## 2022-05-07 RX ADMIN — Medication 650 MILLIGRAM(S): at 01:30

## 2022-05-07 RX ADMIN — Medication 1000 MILLIGRAM(S): at 12:00

## 2022-05-07 RX ADMIN — PANTOPRAZOLE SODIUM 40 MILLIGRAM(S): 20 TABLET, DELAYED RELEASE ORAL at 06:10

## 2022-05-07 RX ADMIN — GABAPENTIN 200 MILLIGRAM(S): 400 CAPSULE ORAL at 22:01

## 2022-05-07 RX ADMIN — Medication 650 MILLIGRAM(S): at 22:05

## 2022-05-07 NOTE — PROGRESS NOTE ADULT - PROBLEM SELECTOR PLAN 3
CT in march showed: possible mural thickening of the rectum may be due to   underdistention, proctitis, or rectal cancer  - will need scope + biopsy to specify  - IV PPI BID  - clear liquid diet starting tonight, mag citrate  -prep tomorrow  -and NPO after midnight tomorrow for colonoscopy  GI consult Dr. Roque  - f/u FOBT

## 2022-05-07 NOTE — PROGRESS NOTE ADULT - ATTENDING COMMENTS
Patient seen and examined earlier this evening    Patient admitted with shortness of breath noted to have symptomatic anemia s/p one unit PRBC with stable H/H; Patient scheduled for EGD/ Colonoscopy Monday as patient was on Plavix (Hx CVA) on hold.     Patient feels much better today; sitting up at edge of bed. would want to defer endoscopy if not needed. Spoke with GI given her prior CT with thickening as well as reported dark stools although on Ferrous tabs as well as risk of procedure with advanced age to be attempted outpatient.   denies fever, chills, SOB, palpitations, chest pain, nausea, vomiting, diarrhea, constipation, dizziness    Vital Signs Last 24 Hrs  T(C): 36.4 (07 May 2022 04:51), Max: 36.7 (06 May 2022 20:27)  T(F): 97.6 (07 May 2022 04:51), Max: 98 (06 May 2022 20:27)  HR: 65 (07 May 2022 04:51) (60 - 65)  BP: 126/61 (07 May 2022 04:51) (116/62 - 146/74)  BP(mean): 80 (06 May 2022 20:27) (80 - 89)  RR: 18 (07 May 2022 04:51) (17 - 18)  SpO2: 96% (07 May 2022 04:51) (96% - 99%)    Labs:                                 9.6    6.37  )-----------( 468      ( 07 May 2022 07:10 )             29.5   05-07    131<L>  |  98  |  20<H>  ----------------------------<  114<H>  4.8   |  28  |  1.16    Ca    8.8      07 May 2022 07:10  Phos  4.1     05-07  Mg     1.8     05-07    TPro  7.0  /  Alb  3.3<L>  /  TBili  0.7  /  DBili  x   /  AST  15  /  ALT  13  /  AlkPhos  103  05-07    D/D:  Symptomatic anemia s/p PRBC H/H stable  suspect upper GI bleed possibly induced by Plavix  Hyponatrmeia on admission likely hypovolemic reslved  Hx HFpEF stable    Plan:  Soft diet at lunch then FULL LIQUIDS  GI consult and f/u appreciated; d/w Dr. Roque; EGD/Colon Monday schedule  Bowel prep as per GI; Mag citrate today afternoon then Golytely tomorrow      Spoke with Son Santi [phone] over the phone and updated presentation, current clinical status.  Agree with procedure; patient also in agreement  Rest as per PGY1 above; d/w PGY1 Dr. Díaz and RN No

## 2022-05-07 NOTE — PROGRESS NOTE ADULT - PROBLEM SELECTOR PLAN 1
p/w Hb 6.8, normocytic anemia chronic baseline is 8  s/p 1 unit PRBC in ED w/ Lasix 40 IVP  - f/u FOBT  - CBC q 12  - IV PPI BID  - clear liquid diet starting tonight, mag citrate  -prep tomorrow  -and NPO after midnight tomorrow for colonoscopy  GI consult Dr. Roque

## 2022-05-07 NOTE — PROGRESS NOTE ADULT - SUBJECTIVE AND OBJECTIVE BOX
PGY-1 Progress Note discussed with attending    PAGER #: [[phone]] TILL 5:00 PM  PLEASE CONTACT ON CALL TEAM:  - On Call Team (Please refer to Joe) FROM 5:00 PM - 8:30PM  - Nightfloat Team FROM 8:30 -7:30 AM    CHIEF COMPLAINT & BRIEF HOSPITAL COURSE:    88 yo F, AAOx3 HARD OF HEARING, ambulates w/ walker, w/ Hx CHFpEF w/ GIIDD, CVA (>15yrs on Plavix), DM (no meds to avoid hypoglycemia), Fe def anemia, possible rectal mass and HTN who was BIB family for shortness of breath and abnormal labs at PCP office. Ms. Tejada was admitted on 3/3 - 3/9 for AHRF 2/2 fluid overload with new diagnosis of CHF and Fe def anemia. CT abd showed possible rectal mass but patient and family deferred to follow up with GI Dr. Roque as oupatient for scope. Since discharge home increasingly short of breath with increased leg swelling and fatigue also reports running out of meds:  iron or lasix 40 that had been given po daily for 5 days. Was seen by PCP 4 days ago to have labs drawn and was referred 5/4 to come to the hospital for a critical hemoglobin. Ms. Tejada has not yet been able to follow up with cardiology or GI as outpatient due to long waits for appointment availability.    In the ED: HD stable   Exam: benign,  Labs: with hgb 7.8, some elevation in BNP 900s    Patient was admitted for symptomatic anemia, received 1 u PRBC and is for GI evaluation with colonoscopy monday, for prep tomorrow    OVERNIGHT  No events   c/o of relentless headache with sinus pressure,        MEDICATIONS  (STANDING):  acetaminophen   IVPB .. 1000 milliGRAM(s) IV Intermittent once  furosemide    Tablet 20 milliGRAM(s) Oral daily  gabapentin 200 milliGRAM(s) Oral at bedtime  insulin lispro (ADMELOG) corrective regimen sliding scale   SubCutaneous three times a day before meals  magnesium citrate Oral Solution 1 Bottle Oral once  simvastatin 10 milliGRAM(s) Oral at bedtime    MEDICATIONS  (PRN):  acetaminophen     Tablet .. 650 milliGRAM(s) Oral every 6 hours PRN Mild Pain (1 - 3), Moderate Pain (4 - 6)      REVIEW OF SYSTEMS:  CONSTITUTIONAL: No fever, weight loss, or fatigue  RESPIRATORY: No cough, wheezing, chills or hemoptysis; No shortness of breath  CARDIOVASCULAR: No chest pain, palpitations, dizziness, or leg swelling  GASTROINTESTINAL: No abdominal pain. No nausea, vomiting, or hematemesis; No diarrhea or constipation. No melena or hematochezia.  GENITOURINARY: No dysuria or hematuria, urinary frequency  NEUROLOGICAL:+ headaches, no memory loss, loss of strength, numbness, or tremors  SKIN: No itching, burning, rashes, or lesions     Vital Signs Last 24 Hrs  T(C): 36.4 (07 May 2022 04:51), Max: 36.7 (06 May 2022 20:27)  T(F): 97.6 (07 May 2022 04:51), Max: 98 (06 May 2022 20:27)  HR: 65 (07 May 2022 04:51) (60 - 65)  BP: 126/61 (07 May 2022 04:51) (116/62 - 146/74)  BP(mean): 80 (06 May 2022 20:27) (80 - 89)  RR: 18 (07 May 2022 04:51) (17 - 18)  SpO2: 96% (07 May 2022 04:51) (96% - 99%)    PHYSICAL EXAMINATION:  GENERAL: NAD, average build  HEAD:  Atraumatic, Normocephalic, sinus tenderness on palpation - maxillary   EYES:  conjunctiva and sclera clear  NECK: Supple, No JVD  CHEST/LUNG: Clear to auscultation. Clear to percussion bilaterally; No rales, rhonchi, wheezing, or rubs  HEART: Regular rate and rhythm; No murmurs, rubs, or gallops  ABDOMEN: Soft, Nontender, Nondistended; Bowel sounds present  NERVOUS SYSTEM:  Alert & Oriented X3,    EXTREMITIES:  2+ Peripheral Pulses, No clubbing, cyanosis, or edema, pain on palpation o right calf  SKIN: warm dry                          9.6    6.37  )-----------( 468      ( 07 May 2022 07:10 )             29.5     05-07    131<L>  |  98  |  20<H>  ----------------------------<  114<H>  4.8   |  28  |  1.16    Ca    8.8      07 May 2022 07:10  Phos  4.1     05-07  Mg     1.8     05-07    TPro  7.0  /  Alb  3.3<L>  /  TBili  0.7  /  DBili  x   /  AST  15  /  ALT  13  /  AlkPhos  103  05-07    LIVER FUNCTIONS - ( 07 May 2022 07:10 )  Alb: 3.3 g/dL / Pro: 7.0 g/dL / ALK PHOS: 103 U/L / ALT: 13 U/L DA / AST: 15 U/L / GGT: x                   CAPILLARY BLOOD GLUCOSE      RADIOLOGY & ADDITIONAL TESTS:

## 2022-05-07 NOTE — PROGRESS NOTE ADULT - ASSESSMENT
89 yo F, AAOx3 HARD OF HEARING, ambulates w/ walker, w/ Hx CHFpEF w/ GIIDD, CVA (>15yrs on Plavix), DM (no meds to avoid hypoglycemia), Fe def anemia, possible rectal mass and HTN admit for symptomatic anemia 2/2 suspected GIB.

## 2022-05-08 LAB
ALBUMIN SERPL ELPH-MCNC: 3.5 G/DL — SIGNIFICANT CHANGE UP (ref 3.5–5)
ALP SERPL-CCNC: 111 U/L — SIGNIFICANT CHANGE UP (ref 40–120)
ALT FLD-CCNC: 15 U/L DA — SIGNIFICANT CHANGE UP (ref 10–60)
ANION GAP SERPL CALC-SCNC: 9 MMOL/L — SIGNIFICANT CHANGE UP (ref 5–17)
AST SERPL-CCNC: 17 U/L — SIGNIFICANT CHANGE UP (ref 10–40)
BASOPHILS # BLD AUTO: 0.04 K/UL — SIGNIFICANT CHANGE UP (ref 0–0.2)
BASOPHILS NFR BLD AUTO: 0.8 % — SIGNIFICANT CHANGE UP (ref 0–2)
BILIRUB SERPL-MCNC: 0.6 MG/DL — SIGNIFICANT CHANGE UP (ref 0.2–1.2)
BUN SERPL-MCNC: 23 MG/DL — HIGH (ref 7–18)
CALCIUM SERPL-MCNC: 9.1 MG/DL — SIGNIFICANT CHANGE UP (ref 8.4–10.5)
CHLORIDE SERPL-SCNC: 98 MMOL/L — SIGNIFICANT CHANGE UP (ref 96–108)
CO2 SERPL-SCNC: 27 MMOL/L — SIGNIFICANT CHANGE UP (ref 22–31)
CREAT SERPL-MCNC: 1.06 MG/DL — SIGNIFICANT CHANGE UP (ref 0.5–1.3)
EGFR: 50 ML/MIN/1.73M2 — LOW
EOSINOPHIL # BLD AUTO: 0.27 K/UL — SIGNIFICANT CHANGE UP (ref 0–0.5)
EOSINOPHIL NFR BLD AUTO: 5.6 % — SIGNIFICANT CHANGE UP (ref 0–6)
FOLATE SERPL-MCNC: 9.1 NG/ML — SIGNIFICANT CHANGE UP
GLUCOSE BLDC GLUCOMTR-MCNC: 119 MG/DL — HIGH (ref 70–99)
GLUCOSE BLDC GLUCOMTR-MCNC: 121 MG/DL — HIGH (ref 70–99)
GLUCOSE BLDC GLUCOMTR-MCNC: 88 MG/DL — SIGNIFICANT CHANGE UP (ref 70–99)
GLUCOSE BLDC GLUCOMTR-MCNC: 96 MG/DL — SIGNIFICANT CHANGE UP (ref 70–99)
GLUCOSE SERPL-MCNC: 88 MG/DL — SIGNIFICANT CHANGE UP (ref 70–99)
HCT VFR BLD CALC: 32.5 % — LOW (ref 34.5–45)
HGB BLD-MCNC: 10.2 G/DL — LOW (ref 11.5–15.5)
IMM GRANULOCYTES NFR BLD AUTO: 0.2 % — SIGNIFICANT CHANGE UP (ref 0–1.5)
LYMPHOCYTES # BLD AUTO: 1.15 K/UL — SIGNIFICANT CHANGE UP (ref 1–3.3)
LYMPHOCYTES # BLD AUTO: 23.7 % — SIGNIFICANT CHANGE UP (ref 13–44)
MAGNESIUM SERPL-MCNC: 2.1 MG/DL — SIGNIFICANT CHANGE UP (ref 1.6–2.6)
MCHC RBC-ENTMCNC: 28.1 PG — SIGNIFICANT CHANGE UP (ref 27–34)
MCHC RBC-ENTMCNC: 31.4 GM/DL — LOW (ref 32–36)
MCV RBC AUTO: 89.5 FL — SIGNIFICANT CHANGE UP (ref 80–100)
MONOCYTES # BLD AUTO: 0.54 K/UL — SIGNIFICANT CHANGE UP (ref 0–0.9)
MONOCYTES NFR BLD AUTO: 11.1 % — SIGNIFICANT CHANGE UP (ref 2–14)
NEUTROPHILS # BLD AUTO: 2.84 K/UL — SIGNIFICANT CHANGE UP (ref 1.8–7.4)
NEUTROPHILS NFR BLD AUTO: 58.6 % — SIGNIFICANT CHANGE UP (ref 43–77)
NRBC # BLD: 0 /100 WBCS — SIGNIFICANT CHANGE UP (ref 0–0)
PHOSPHATE SERPL-MCNC: 4.5 MG/DL — SIGNIFICANT CHANGE UP (ref 2.5–4.5)
PLATELET # BLD AUTO: 501 K/UL — HIGH (ref 150–400)
POTASSIUM SERPL-MCNC: 4.5 MMOL/L — SIGNIFICANT CHANGE UP (ref 3.5–5.3)
POTASSIUM SERPL-SCNC: 4.5 MMOL/L — SIGNIFICANT CHANGE UP (ref 3.5–5.3)
PROT SERPL-MCNC: 7.2 G/DL — SIGNIFICANT CHANGE UP (ref 6–8.3)
RBC # BLD: 3.63 M/UL — LOW (ref 3.8–5.2)
RBC # FLD: 15.1 % — HIGH (ref 10.3–14.5)
SARS-COV-2 RNA SPEC QL NAA+PROBE: DETECTED
SARS-COV-2 RNA SPEC QL NAA+PROBE: SIGNIFICANT CHANGE UP
SODIUM SERPL-SCNC: 134 MMOL/L — LOW (ref 135–145)
VIT B12 SERPL-MCNC: 293 PG/ML — SIGNIFICANT CHANGE UP (ref 232–1245)
WBC # BLD: 4.85 K/UL — SIGNIFICANT CHANGE UP (ref 3.8–10.5)
WBC # FLD AUTO: 4.85 K/UL — SIGNIFICANT CHANGE UP (ref 3.8–10.5)

## 2022-05-08 PROCEDURE — 99233 SBSQ HOSP IP/OBS HIGH 50: CPT

## 2022-05-08 RX ORDER — MINERAL OIL
133 OIL (ML) MISCELLANEOUS ONCE
Refills: 0 | Status: DISCONTINUED | OUTPATIENT
Start: 2022-05-08 | End: 2022-05-08

## 2022-05-08 RX ORDER — ONDANSETRON 8 MG/1
4 TABLET, FILM COATED ORAL EVERY 6 HOURS
Refills: 0 | Status: DISCONTINUED | OUTPATIENT
Start: 2022-05-08 | End: 2022-05-09

## 2022-05-08 RX ORDER — SODIUM CHLORIDE 9 MG/ML
1000 INJECTION, SOLUTION INTRAVENOUS
Refills: 0 | Status: DISCONTINUED | OUTPATIENT
Start: 2022-05-08 | End: 2022-05-08

## 2022-05-08 RX ORDER — PANTOPRAZOLE SODIUM 20 MG/1
40 TABLET, DELAYED RELEASE ORAL EVERY 12 HOURS
Refills: 0 | Status: DISCONTINUED | OUTPATIENT
Start: 2022-05-08 | End: 2022-05-09

## 2022-05-08 RX ORDER — MULTIVIT WITH MIN/MFOLATE/K2 340-15/3 G
1 POWDER (GRAM) ORAL ONCE
Refills: 0 | Status: DISCONTINUED | OUTPATIENT
Start: 2022-05-08 | End: 2022-05-08

## 2022-05-08 RX ORDER — PANTOPRAZOLE SODIUM 20 MG/1
40 TABLET, DELAYED RELEASE ORAL DAILY
Refills: 0 | Status: DISCONTINUED | OUTPATIENT
Start: 2022-05-08 | End: 2022-05-08

## 2022-05-08 RX ORDER — MINERAL OIL
133 OIL (ML) MISCELLANEOUS
Refills: 0 | Status: DISCONTINUED | OUTPATIENT
Start: 2022-05-09 | End: 2022-05-09

## 2022-05-08 RX ORDER — POLYETHYLENE GLYCOL 3350 17 G/17G
17 POWDER, FOR SOLUTION ORAL DAILY
Refills: 0 | Status: DISCONTINUED | OUTPATIENT
Start: 2022-05-08 | End: 2022-05-08

## 2022-05-08 RX ORDER — ONDANSETRON 8 MG/1
5 TABLET, FILM COATED ORAL EVERY 6 HOURS
Refills: 0 | Status: DISCONTINUED | OUTPATIENT
Start: 2022-05-08 | End: 2022-05-08

## 2022-05-08 RX ORDER — SENNA PLUS 8.6 MG/1
2 TABLET ORAL AT BEDTIME
Refills: 0 | Status: DISCONTINUED | OUTPATIENT
Start: 2022-05-08 | End: 2022-05-09

## 2022-05-08 RX ORDER — MULTIVIT WITH MIN/MFOLATE/K2 340-15/3 G
1 POWDER (GRAM) ORAL ONCE
Refills: 0 | Status: COMPLETED | OUTPATIENT
Start: 2022-05-08 | End: 2022-05-08

## 2022-05-08 RX ORDER — SODIUM CHLORIDE 9 MG/ML
500 INJECTION, SOLUTION INTRAVENOUS ONCE
Refills: 0 | Status: COMPLETED | OUTPATIENT
Start: 2022-05-08 | End: 2022-05-08

## 2022-05-08 RX ADMIN — SODIUM CHLORIDE 1000 MILLILITER(S): 9 INJECTION, SOLUTION INTRAVENOUS at 22:32

## 2022-05-08 RX ADMIN — PANTOPRAZOLE SODIUM 40 MILLIGRAM(S): 20 TABLET, DELAYED RELEASE ORAL at 22:32

## 2022-05-08 RX ADMIN — Medication 1 BOTTLE: at 20:14

## 2022-05-08 RX ADMIN — ONDANSETRON 4 MILLIGRAM(S): 8 TABLET, FILM COATED ORAL at 22:32

## 2022-05-08 NOTE — PROGRESS NOTE ADULT - SUBJECTIVE AND OBJECTIVE BOX
Patient seen and examined earlier this afternoon.     Patient admitted with shortness of breath noted to have symptomatic anemia s/p one unit PRBC with stable H/H; Patient scheduled for EGD/ Colonoscopy Monday as patient was on Plavix (Hx CVA) on hold.     Patient had screening COVID swab positive this morning and was moved to Isolation. I requested a repeat testing which was negative; Discussed with infection control RN Delmi and Lab and repeated swab which was negative. initial swab was retested and was negative. Lab tested both swabs again and both was negative. Patient was disocntinued airborne isolation an was moved back to her original location but given she was moved to COVID isolation room for few hours with other patients placed on Droplet precautions; Son Santi was notified earlier about positive COVID by Resident. I spoke with Son earlier this afternnoon and updated negative test but will monitor with droplet precautions.    Son also informed patient refused Colonoscopy and even if we proceed and find something she is not a candidate for any chemotherpay etc. Patient echoed same   Spoke with GI and offered Sigmoidoscopy  Patient refused Colonoscopy  denies fever, chills, SOB, palpitations, chest pain, nausea, vomiting, diarrhea, constipation, dizziness    Vital Signs Last 24 Hrs  T(C): 36.7 (08 May 2022 18:02), Max: 37.1 (08 May 2022 11:13)  T(F): 98 (08 May 2022 18:02), Max: 98.8 (08 May 2022 11:13)  HR: 71 (08 May 2022 18:02) (59 - 71)  BP: 130/80 (08 May 2022 18:02) (105/60 - 130/80)  RR: 18 (08 May 2022 18:02) (16 - 18)  SpO2: 98% (08 May 2022 18:02) (97% - 99%)    Labs:                        10.2   4.85  )-----------( 501      ( 08 May 2022 08:34 )             32.5   05-08    134<L>  |  98  |  23<H>  ----------------------------<  88  4.5   |  27  |  1.06    Ca    9.1      08 May 2022 08:34  Phos  4.5     05-08  Mg     2.1     05-08    TPro  7.2  /  Alb  3.5  /  TBili  0.6  /  DBili  x   /  AST  17  /  ALT  15  /  AlkPhos  111  05-08                            D/D:  Symptomatic anemia s/p PRBC H/H stable  suspect upper GI bleed possibly induced by Plavix  Hyponatrmeia on admission likely hypovolemic reslved  Hx HFpEF stable    Plan:  Soft diet at lunch then FULL LIQUIDS  GI consult and f/u appreciated; d/w Dr. Roque; EGD/Colon Monday schedule  Bowel prep as per GI; Mag citrate today afternoon then Golytely tomorrow      Spoke with Son Santi [phone] over the phone and updated presentation, current clinical status.  Agree with procedure; patient also in agreement  Rest as per PGY1 above; d/w PGY1 Dr. Díaz and RN . Patient seen and examined earlier this afternoon.     Patient admitted with shortness of breath noted to have symptomatic anemia s/p one unit PRBC with stable H/H; Patient scheduled for EGD/ Colonoscopy Monday as patient was on Plavix (Hx CVA) on hold.     Patient had screening COVID swab positive this morning and was moved to Isolation. I requested a repeat testing which was negative; Discussed with infection control RN Delmi and Lab and repeated swab which was negative. initial swab was retested and was negative. Lab tested both swabs again and both was negative. Patient was disocntinued airborne isolation an was moved back to her original location but given she was moved to COVID isolation room for few hours with other patients placed on Droplet precautions; Son Santi was notified earlier about positive COVID by Resident. I spoke with Son earlier this afternnoon and updated negative test but will monitor with droplet precautions.    Son also informed patient refused Colonoscopy and even if we proceed and find something she is not a candidate for any chemotherpay etc. Patient echoed same   Spoke with GI and offered Sigmoidoscopy  Patient denies fever, chills, SOB, palpitations, chest pain, nausea, vomiting, diarrhea, constipation, dizziness    MEDICATIONS  (STANDING):  furosemide    Tablet 20 milliGRAM(s) Oral daily  gabapentin 200 milliGRAM(s) Oral at bedtime  insulin lispro (ADMELOG) corrective regimen sliding scale   SubCutaneous three times a day before meals  polyethylene glycol 3350 17 Gram(s) Oral daily  senna 2 Tablet(s) Oral at bedtime  simvastatin 10 milliGRAM(s) Oral at bedtime    MEDICATIONS  (PRN):  acetaminophen     Tablet .. 650 milliGRAM(s) Oral every 6 hours PRN Mild Pain (1 - 3), Moderate Pain (4 - 6)      Vital Signs Last 24 Hrs  T(C): 36.7 (08 May 2022 18:02), Max: 37.1 (08 May 2022 11:13)  T(F): 98 (08 May 2022 18:02), Max: 98.8 (08 May 2022 11:13)  HR: 71 (08 May 2022 18:02) (59 - 71)  BP: 130/80 (08 May 2022 18:02) (105/60 - 130/80)  RR: 18 (08 May 2022 18:02) (16 - 18)  SpO2: 98% (08 May 2022 18:02) (97% - 99%)    Labs:                      10.2   4.85  )-----------( 501      ( 08 May 2022 08:34 )             32.5   05-08    134<L>  |  98  |  23<H>  ----------------------------<  88  4.5   |  27  |  1.06    Ca    9.1      08 May 2022 08:34  Phos  4.5     05-08  Mg     2.1     05-08    TPro  7.2  /  Alb  3.5  /  TBili  0.6  /  DBili  x   /  AST  17  /  ALT  15  /  AlkPhos  111  05-08    COVID-19 PCR . (05.08.22 @ 11:13) COVID-19 PCR: NotDetec: EUA/IVD     COVID-19 PCR . 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    --- End of Report ---    *** END OF ADDENDUM***    PROCEDURE DATE:  03/06/2022        FINDINGS:  LOWER CHEST: Mild bilateral pleural effusions, grossly unchanged on the right and slightly decreased on the left. Mild bilateral atelectasis.    LIVER: Within normal limits.  BILE DUCTS: Normal caliber.  GALLBLADDER: Cholelithiasis. No evidence for thickened gallbladder wall or pericholecystic fluid.  SPLEEN: Within normal limits.  PANCREAS: Within normal limits.  ADRENALS: Within normal limits.  KIDNEYS/URETERS: Stable hypodense lesions in the right kidney, representing probable cysts. No evidence for a calculus in the kidneys or ureters. No hydronephrosis.    BLADDER: Stable small air in the urinary bladder may be due to prior Fernando catheter placement or cystitis. Clinical correlation is recommended.  REPRODUCTIVE ORGANS: Mild fullness of the posterior lower uterine segment may be due to a fibroid. The adnexa appear grossly unremarkable.    BOWEL: No bowel obstruction. Colonic diverticulosis without evidence of diverticulitis. Appendix not visualized. No secondary signs for acute appendicitis. Possible mural thickening of the rectum may be due to underdistention, proctitis, or rectal cancer. Clinical correlation is recommended.  PERITONEUM: No free air or ascites.  VESSELS: Calcified atherosclerotic disease.  RETROPERITONEUM/LYMPH NODES: No lymphadenopathy.  ABDOMINAL WALL: Stable fat-containing ventral hernia.  BONES: Degenerative spondylosis. Grade 1 anterolisthesis at L4-5.    IMPRESSION:  No bowel obstruction. Colonic diverticulosis without evidence of diverticulitis. Appendix not visualized. No secondary signs for acute appendicitis. Possible mural thickening of the rectum may be due to underdistention, proctitis, or rectal cancer. Clinical correlation is   recommended.    Stable small air in the urinary bladder may be due to prior Fernando catheter placement or cystitis. Clinical correlation is recommended.    Mild bilateral pleural effusions.

## 2022-05-08 NOTE — PROGRESS NOTE ADULT - ASSESSMENT
D/D:  Symptomatic anemia s/p PRBC H/H stable  suspect upper GI bleed possibly induced by Plavix  Possible Diverticular bleed Hx Duodenal Diverticula  Hx Rectal thickening etiology unclear   Hyponatremia on admission likely hypovolemic resolved  Hx HFpEF stable    Plan:  Continue on droplet precautions; COVID negative x 2 but given potential exposure in COVID isolation room will keep on droplet precautions  Patient refused prep and Colonoscopy; verified with patient and Son; wants to proceed with EGD only  Discussed with GI Dr. bobby; offered at least Sigmoidoscopy to evaluate the rectal thickening form 2 months ago.   I discussed at length with patient and she is still hesitant; spoke with Son again to speak with her; only prep will be Mag. Citrate and two enemas one this evening and one tomorrow morning.       Spoke with Son Santi [phone] over the phone this morning and updated; again spoke with him this evening     D/D:  Symptomatic anemia s/p PRBC H/H stable  suspect upper GI bleed possibly induced by Plavix  Possible Diverticular bleed Hx Duodenal Diverticula  Hx Rectal thickening etiology unclear   Hyponatremia on admission likely hypovolemic resolved  Hx HFpEF stable    Plan:  Continue on droplet precautions; COVID negative x 2 but given potential exposure in COVID isolation room will keep on droplet precautions  Patient refused prep and Colonoscopy; verified with patient and Son; wants to proceed with EGD only  Discussed with GI Dr. bobby; offered at least Sigmoidoscopy to evaluate the rectal thickening form 2 months ago.   I discussed at length with patient and she is still hesitant; spoke with Son again to speak with her; only prep will be Mag. Citrate and two enemas one this evening and one tomorrow morning. Son spoke with patient; agreed for sigmoidoscopy; prep ordered as above  NPO after midnight  H/H remain stable suggesting no active bleed  Resume Plavix with Pepcid after EGD if no active source of bleed  Hold Lasix tomorrow; Resume after Endo if clinically indicated; will consider three times a week as HF appear stable  Son was advised if D/C home tomorrow will monitor for any COVID conversion for 2-3 days      Spoke with Son Santi [phone] over the phone this morning and updated; again spoke with him this evening  Son would like her to be discharged tomorrow after Endoscopy if feasible; will plan if done early by afternoon  Discussed with RN/ Nursing supervisor and multiple nursing staff throughout day

## 2022-05-08 NOTE — RAPID RESPONSE TEAM SUMMARY - NSSITUATIONBACKGROUNDRRT_GEN_ALL_CORE
RRT was called at 10.10Pm. patient was found by RN on bedside commode, having a syncopal episode. Patient was responsive, was helped to the bed. BP initially was noted to be 55/38mmHg. Within a minute patient started feeling better. She said that she was feeling dizzy and couldn't see for few seconds and was having lightheadedness. Patient states that she had 2-3 episodes of vomiting since evening due to mag citrate. As per RN and charts, she was planned on receiving mag citrate and 2 enemas for EGD and sigmoidoscopy(if patient agrees).    Within few minutes patient's BP improved to 108/78mmHg. Patient was given 500cc LR in concern for dehydration, given episodes of vomiting. BMP was sent to check for electrolyte abnormalities given the episodes of vomiting. Will monitor BP overnight and endorse the overnight events to morning team.

## 2022-05-09 VITALS
OXYGEN SATURATION: 95 % | SYSTOLIC BLOOD PRESSURE: 134 MMHG | DIASTOLIC BLOOD PRESSURE: 77 MMHG | RESPIRATION RATE: 18 BRPM | TEMPERATURE: 98 F | HEART RATE: 89 BPM

## 2022-05-09 LAB
ALBUMIN SERPL ELPH-MCNC: 3 G/DL — LOW (ref 3.5–5)
ALP SERPL-CCNC: 100 U/L — SIGNIFICANT CHANGE UP (ref 40–120)
ALT FLD-CCNC: 15 U/L DA — SIGNIFICANT CHANGE UP (ref 10–60)
ANION GAP SERPL CALC-SCNC: 11 MMOL/L — SIGNIFICANT CHANGE UP (ref 5–17)
ANION GAP SERPL CALC-SCNC: 5 MMOL/L — SIGNIFICANT CHANGE UP (ref 5–17)
ANION GAP SERPL CALC-SCNC: 8 MMOL/L — SIGNIFICANT CHANGE UP (ref 5–17)
APTT BLD: 26.7 SEC — LOW (ref 27.5–35.5)
AST SERPL-CCNC: 16 U/L — SIGNIFICANT CHANGE UP (ref 10–40)
BASOPHILS # BLD AUTO: 0.04 K/UL — SIGNIFICANT CHANGE UP (ref 0–0.2)
BASOPHILS NFR BLD AUTO: 0.6 % — SIGNIFICANT CHANGE UP (ref 0–2)
BILIRUB SERPL-MCNC: 0.5 MG/DL — SIGNIFICANT CHANGE UP (ref 0.2–1.2)
BLD GP AB SCN SERPL QL: SIGNIFICANT CHANGE UP
BUN SERPL-MCNC: 31 MG/DL — HIGH (ref 7–18)
BUN SERPL-MCNC: 34 MG/DL — HIGH (ref 7–18)
BUN SERPL-MCNC: 34 MG/DL — HIGH (ref 7–18)
CALCIUM SERPL-MCNC: 8.8 MG/DL — SIGNIFICANT CHANGE UP (ref 8.4–10.5)
CALCIUM SERPL-MCNC: 9 MG/DL — SIGNIFICANT CHANGE UP (ref 8.4–10.5)
CALCIUM SERPL-MCNC: 9.3 MG/DL — SIGNIFICANT CHANGE UP (ref 8.4–10.5)
CHLORIDE SERPL-SCNC: 100 MMOL/L — SIGNIFICANT CHANGE UP (ref 96–108)
CHLORIDE SERPL-SCNC: 100 MMOL/L — SIGNIFICANT CHANGE UP (ref 96–108)
CHLORIDE SERPL-SCNC: 103 MMOL/L — SIGNIFICANT CHANGE UP (ref 96–108)
CO2 SERPL-SCNC: 23 MMOL/L — SIGNIFICANT CHANGE UP (ref 22–31)
CO2 SERPL-SCNC: 26 MMOL/L — SIGNIFICANT CHANGE UP (ref 22–31)
CO2 SERPL-SCNC: 26 MMOL/L — SIGNIFICANT CHANGE UP (ref 22–31)
CREAT ?TM UR-MCNC: 138 MG/DL — SIGNIFICANT CHANGE UP
CREAT SERPL-MCNC: 1.3 MG/DL — SIGNIFICANT CHANGE UP (ref 0.5–1.3)
CREAT SERPL-MCNC: 1.37 MG/DL — HIGH (ref 0.5–1.3)
CREAT SERPL-MCNC: 1.62 MG/DL — HIGH (ref 0.5–1.3)
EGFR: 30 ML/MIN/1.73M2 — LOW
EGFR: 37 ML/MIN/1.73M2 — LOW
EGFR: 39 ML/MIN/1.73M2 — LOW
EOSINOPHIL # BLD AUTO: 0.17 K/UL — SIGNIFICANT CHANGE UP (ref 0–0.5)
EOSINOPHIL NFR BLD AUTO: 2.5 % — SIGNIFICANT CHANGE UP (ref 0–6)
GLUCOSE BLDC GLUCOMTR-MCNC: 142 MG/DL — HIGH (ref 70–99)
GLUCOSE BLDC GLUCOMTR-MCNC: 85 MG/DL — SIGNIFICANT CHANGE UP (ref 70–99)
GLUCOSE BLDC GLUCOMTR-MCNC: 93 MG/DL — SIGNIFICANT CHANGE UP (ref 70–99)
GLUCOSE SERPL-MCNC: 110 MG/DL — HIGH (ref 70–99)
GLUCOSE SERPL-MCNC: 82 MG/DL — SIGNIFICANT CHANGE UP (ref 70–99)
GLUCOSE SERPL-MCNC: 97 MG/DL — SIGNIFICANT CHANGE UP (ref 70–99)
HCT VFR BLD CALC: 30.2 % — LOW (ref 34.5–45)
HCT VFR BLD CALC: 32.1 % — LOW (ref 34.5–45)
HGB BLD-MCNC: 10.2 G/DL — LOW (ref 11.5–15.5)
HGB BLD-MCNC: 9.6 G/DL — LOW (ref 11.5–15.5)
IMM GRANULOCYTES NFR BLD AUTO: 0.1 % — SIGNIFICANT CHANGE UP (ref 0–1.5)
INR BLD: 0.98 RATIO — SIGNIFICANT CHANGE UP (ref 0.88–1.16)
LYMPHOCYTES # BLD AUTO: 1.02 K/UL — SIGNIFICANT CHANGE UP (ref 1–3.3)
LYMPHOCYTES # BLD AUTO: 14.9 % — SIGNIFICANT CHANGE UP (ref 13–44)
MAGNESIUM SERPL-MCNC: 2.6 MG/DL — SIGNIFICANT CHANGE UP (ref 1.6–2.6)
MCHC RBC-ENTMCNC: 28.5 PG — SIGNIFICANT CHANGE UP (ref 27–34)
MCHC RBC-ENTMCNC: 28.7 PG — SIGNIFICANT CHANGE UP (ref 27–34)
MCHC RBC-ENTMCNC: 31.8 GM/DL — LOW (ref 32–36)
MCHC RBC-ENTMCNC: 31.8 GM/DL — LOW (ref 32–36)
MCV RBC AUTO: 89.6 FL — SIGNIFICANT CHANGE UP (ref 80–100)
MCV RBC AUTO: 90.2 FL — SIGNIFICANT CHANGE UP (ref 80–100)
MONOCYTES # BLD AUTO: 0.58 K/UL — SIGNIFICANT CHANGE UP (ref 0–0.9)
MONOCYTES NFR BLD AUTO: 8.5 % — SIGNIFICANT CHANGE UP (ref 2–14)
NEUTROPHILS # BLD AUTO: 5.01 K/UL — SIGNIFICANT CHANGE UP (ref 1.8–7.4)
NEUTROPHILS NFR BLD AUTO: 73.4 % — SIGNIFICANT CHANGE UP (ref 43–77)
NRBC # BLD: 0 /100 WBCS — SIGNIFICANT CHANGE UP (ref 0–0)
NRBC # BLD: 0 /100 WBCS — SIGNIFICANT CHANGE UP (ref 0–0)
PHOSPHATE SERPL-MCNC: 5.1 MG/DL — HIGH (ref 2.5–4.5)
PLATELET # BLD AUTO: 456 K/UL — HIGH (ref 150–400)
PLATELET # BLD AUTO: 478 K/UL — HIGH (ref 150–400)
POTASSIUM SERPL-MCNC: 4.3 MMOL/L — SIGNIFICANT CHANGE UP (ref 3.5–5.3)
POTASSIUM SERPL-MCNC: 4.4 MMOL/L — SIGNIFICANT CHANGE UP (ref 3.5–5.3)
POTASSIUM SERPL-MCNC: 4.5 MMOL/L — SIGNIFICANT CHANGE UP (ref 3.5–5.3)
POTASSIUM SERPL-SCNC: 4.3 MMOL/L — SIGNIFICANT CHANGE UP (ref 3.5–5.3)
POTASSIUM SERPL-SCNC: 4.4 MMOL/L — SIGNIFICANT CHANGE UP (ref 3.5–5.3)
POTASSIUM SERPL-SCNC: 4.5 MMOL/L — SIGNIFICANT CHANGE UP (ref 3.5–5.3)
PROT SERPL-MCNC: 6.7 G/DL — SIGNIFICANT CHANGE UP (ref 6–8.3)
PROTHROM AB SERPL-ACNC: 11.6 SEC — SIGNIFICANT CHANGE UP (ref 10.5–13.4)
RBC # BLD: 3.37 M/UL — LOW (ref 3.8–5.2)
RBC # BLD: 3.56 M/UL — LOW (ref 3.8–5.2)
RBC # FLD: 15.3 % — HIGH (ref 10.3–14.5)
RBC # FLD: 15.3 % — HIGH (ref 10.3–14.5)
SODIUM SERPL-SCNC: 134 MMOL/L — LOW (ref 135–145)
SODIUM UR-SCNC: 16 MMOL/L — SIGNIFICANT CHANGE UP
WBC # BLD: 10.24 K/UL — SIGNIFICANT CHANGE UP (ref 3.8–10.5)
WBC # BLD: 6.83 K/UL — SIGNIFICANT CHANGE UP (ref 3.8–10.5)
WBC # FLD AUTO: 10.24 K/UL — SIGNIFICANT CHANGE UP (ref 3.8–10.5)
WBC # FLD AUTO: 6.83 K/UL — SIGNIFICANT CHANGE UP (ref 3.8–10.5)

## 2022-05-09 PROCEDURE — 88305 TISSUE EXAM BY PATHOLOGIST: CPT | Mod: 26

## 2022-05-09 PROCEDURE — 36415 COLL VENOUS BLD VENIPUNCTURE: CPT

## 2022-05-09 PROCEDURE — 0225U NFCT DS DNA&RNA 21 SARSCOV2: CPT

## 2022-05-09 PROCEDURE — 82746 ASSAY OF FOLIC ACID SERUM: CPT

## 2022-05-09 PROCEDURE — 80053 COMPREHEN METABOLIC PANEL: CPT

## 2022-05-09 PROCEDURE — 93970 EXTREMITY STUDY: CPT

## 2022-05-09 PROCEDURE — 86900 BLOOD TYPING SEROLOGIC ABO: CPT

## 2022-05-09 PROCEDURE — 43255 EGD CONTROL BLEEDING ANY: CPT

## 2022-05-09 PROCEDURE — 87635 SARS-COV-2 COVID-19 AMP PRB: CPT

## 2022-05-09 PROCEDURE — 36430 TRANSFUSION BLD/BLD COMPNT: CPT

## 2022-05-09 PROCEDURE — P9040: CPT

## 2022-05-09 PROCEDURE — 99285 EMERGENCY DEPT VISIT HI MDM: CPT

## 2022-05-09 PROCEDURE — 85025 COMPLETE CBC W/AUTO DIFF WBC: CPT

## 2022-05-09 PROCEDURE — 85610 PROTHROMBIN TIME: CPT

## 2022-05-09 PROCEDURE — 83880 ASSAY OF NATRIURETIC PEPTIDE: CPT

## 2022-05-09 PROCEDURE — 86850 RBC ANTIBODY SCREEN: CPT

## 2022-05-09 PROCEDURE — 82962 GLUCOSE BLOOD TEST: CPT

## 2022-05-09 PROCEDURE — 88305 TISSUE EXAM BY PATHOLOGIST: CPT

## 2022-05-09 PROCEDURE — 71045 X-RAY EXAM CHEST 1 VIEW: CPT

## 2022-05-09 PROCEDURE — 45378 DIAGNOSTIC COLONOSCOPY: CPT | Mod: 52

## 2022-05-09 PROCEDURE — 86923 COMPATIBILITY TEST ELECTRIC: CPT

## 2022-05-09 PROCEDURE — 88312 SPECIAL STAINS GROUP 1: CPT

## 2022-05-09 PROCEDURE — 82607 VITAMIN B-12: CPT

## 2022-05-09 PROCEDURE — 84300 ASSAY OF URINE SODIUM: CPT

## 2022-05-09 PROCEDURE — 88312 SPECIAL STAINS GROUP 1: CPT | Mod: 26

## 2022-05-09 PROCEDURE — 82570 ASSAY OF URINE CREATININE: CPT

## 2022-05-09 PROCEDURE — 99239 HOSP IP/OBS DSCHRG MGMT >30: CPT | Mod: GC

## 2022-05-09 PROCEDURE — 83735 ASSAY OF MAGNESIUM: CPT

## 2022-05-09 PROCEDURE — 86901 BLOOD TYPING SEROLOGIC RH(D): CPT

## 2022-05-09 PROCEDURE — 84100 ASSAY OF PHOSPHORUS: CPT

## 2022-05-09 PROCEDURE — 85027 COMPLETE CBC AUTOMATED: CPT

## 2022-05-09 PROCEDURE — 83090 ASSAY OF HOMOCYSTEINE: CPT

## 2022-05-09 PROCEDURE — 80048 BASIC METABOLIC PNL TOTAL CA: CPT

## 2022-05-09 PROCEDURE — 93005 ELECTROCARDIOGRAM TRACING: CPT

## 2022-05-09 PROCEDURE — 83921 ORGANIC ACID SINGLE QUANT: CPT

## 2022-05-09 PROCEDURE — 85730 THROMBOPLASTIN TIME PARTIAL: CPT

## 2022-05-09 RX ORDER — PANTOPRAZOLE SODIUM 20 MG/1
1 TABLET, DELAYED RELEASE ORAL
Qty: 60 | Refills: 0
Start: 2022-05-09 | End: 2022-06-07

## 2022-05-09 RX ORDER — GABAPENTIN 400 MG/1
2 CAPSULE ORAL
Qty: 60 | Refills: 0
Start: 2022-05-09 | End: 2022-06-07

## 2022-05-09 RX ORDER — FUROSEMIDE 40 MG
1 TABLET ORAL
Qty: 13 | Refills: 0
Start: 2022-05-09 | End: 2022-06-07

## 2022-05-09 RX ORDER — ACETAMINOPHEN 500 MG
2 TABLET ORAL
Qty: 0 | Refills: 0 | DISCHARGE

## 2022-05-09 RX ORDER — SODIUM CHLORIDE 9 MG/ML
1000 INJECTION, SOLUTION INTRAVENOUS
Refills: 0 | Status: DISCONTINUED | OUTPATIENT
Start: 2022-05-09 | End: 2022-05-09

## 2022-05-09 RX ADMIN — PANTOPRAZOLE SODIUM 40 MILLIGRAM(S): 20 TABLET, DELAYED RELEASE ORAL at 06:57

## 2022-05-09 RX ADMIN — PANTOPRAZOLE SODIUM 40 MILLIGRAM(S): 20 TABLET, DELAYED RELEASE ORAL at 17:43

## 2022-05-09 RX ADMIN — SODIUM CHLORIDE 75 MILLILITER(S): 9 INJECTION, SOLUTION INTRAVENOUS at 09:47

## 2022-05-09 NOTE — PROGRESS NOTE ADULT - PROBLEM SELECTOR PLAN 8
HOLDING amlodipine until BP rises  - vs q 4 hours
HOLD DVT PPX  PPI IV BID
HOLDING amlodipine until BP rises  - vs q 4 hours

## 2022-05-09 NOTE — PROGRESS NOTE ADULT - PROBLEM SELECTOR PLAN 5
Patient complains of numbness in the fingertips  ttp of the calves on exam  DVT us -ve  will check b12, folate, MMA, homocystein   start 200mg  gabapentin at bed time
Last a1c was 6.3 on admission in march  has history of low BG so does not take her metformin  - ss insulin for now
Patient complains of numbness in the fingertips  ttp of the calves on exam  DVT us -ve  will check b12, folate, MMA, homocystein   started 200mg  gabapentin at bed time

## 2022-05-09 NOTE — DISCHARGE NOTE PROVIDER - ATTENDING DISCHARGE PHYSICAL EXAMINATION:
Psych: AAO x3  Neuro: No gross focal deficits; Power and sensation intact  CVS: S1S2 present, regular, no edema  Resp: BLAE+, No wheeze or Rhonchi  GI: Soft, BS+, Non tender, non distended  Extr: No  calf tenderness B/L Lower extremities  Skin: Warm and moist without any rashes     Psych: AAO x3  Neuro: No gross focal deficits; Power and sensation intact  CVS: S1S2 present, regular, no edema  Resp: BLAE+, No wheeze or Rhonchi  GI: Soft, BS+, Non tender, non distended  Extr: No  calf tenderness B/L Lower extremities; Right leg some tenderness on palpation above lower 1/3rd  Skin: Warm and moist without any rashes

## 2022-05-09 NOTE — PROGRESS NOTE ADULT - PROBLEM SELECTOR PLAN 1
p/w Hb 6.8, normocytic anemia chronic baseline is 8  s/p 1 unit PRBC in ED w/ Lasix 40 IVP  - f/u FOBT  - CBC q 12  - IV PPI BID  -s/p  clear liquid diet saturday  -family declined colonoscopy, agreed for sigmoid, for evaluation of rectal mass  -incomplete prep due to nausea and hypotension, now s/p small bolus with improvement in condition, patient needs enema- stable to complete this morning   GI consult Dr. Roque  for EGD, and sigmoidoscopy this PM  DC home after if stable

## 2022-05-09 NOTE — PROGRESS NOTE ADULT - SUBJECTIVE AND OBJECTIVE BOX
PGY-1 Progress Note discussed with attending    PAGER #: [[phone]] TILL 5:00 PM  PLEASE CONTACT ON CALL TEAM:  - On Call Team (Please refer to Joe) FROM 5:00 PM - 8:30PM  - Nightfloat Team FROM 8:30 -7:30 AM    CHIEF COMPLAINT & BRIEF HOSPITAL COURSE:  88 yo F, AAOx3 HARD OF HEARING, ambulates w/ walker, w/ Hx CHFpEF w/ GIIDD, CVA (>15yrs on Plavix), DM (no meds to avoid hypoglycemia), Fe def anemia, possible rectal mass and HTN who was BIB family for shortness of breath and abnormal labs at PCP office. Ms. Tejada was admitted on 3/3 - 3/9 for AHRF 2/2 fluid overload with new diagnosis of CHF and Fe def anemia. CT abd showed possible rectal mass but patient and family deferred to follow up with GI Dr. Roque as oupatient for scope. Since discharge home increasingly short of breath with increased leg swelling and fatigue also reports running out of meds:  iron or lasix 40 that had been given po daily for 5 days. Was seen by PCP 4 days ago to have labs drawn and was referred 5/4 to come to the hospital for a critical hemoglobin. Ms. Tejada has not yet been able to follow up with cardiology or GI as outpatient due to long waits for appointment availability.    In the ED: HD stable   Exam: benign,  Labs: with hgb 7.8, some elevation in BNP 900s    Patient was admitted for symptomatic anemia, received 1 u PRBC and is for GI evaluation with colonoscopy monday, for prep tomorrow    OVERNIGHT  Family refused colonoscopy, agreed to sigmoidoscopy  She was nauseous and hypotensive after the prep with mag citrate, could not get the enema  She has no complaints this morning    MEDICATIONS  (STANDING):  dextrose 5% + sodium chloride 0.9%. 1000 milliLiter(s) (75 mL/Hr) IV Continuous <Continuous>  gabapentin 200 milliGRAM(s) Oral at bedtime  insulin lispro (ADMELOG) corrective regimen sliding scale   SubCutaneous three times a day before meals  pantoprazole  Injectable 40 milliGRAM(s) IV Push every 12 hours  senna 2 Tablet(s) Oral at bedtime  simvastatin 10 milliGRAM(s) Oral at bedtime    MEDICATIONS  (PRN):  acetaminophen     Tablet .. 650 milliGRAM(s) Oral every 6 hours PRN Mild Pain (1 - 3), Moderate Pain (4 - 6)  ondansetron Injectable 4 milliGRAM(s) IV Push every 6 hours PRN Nausea and/or Vomiting      REVIEW OF SYSTEMS:  CONSTITUTIONAL: No fever, weight loss, or fatigue  RESPIRATORY: No cough, wheezing, chills or hemoptysis; No shortness of breath  CARDIOVASCULAR: No chest pain, palpitations, dizziness, or leg swelling  GASTROINTESTINAL: No abdominal pain. No nausea, vomiting, or hematemesis; No diarrhea or constipation. No melena or hematochezia.  GENITOURINARY: No dysuria or hematuria, urinary frequency  NEUROLOGICAL: No headaches, memory loss, loss of strength, numbness, or tremors  SKIN: No itching, burning, rashes, or lesions     Vital Signs Last 24 Hrs  T(C): 36.5 (09 May 2022 05:23), Max: 37.1 (08 May 2022 11:13)  T(F): 97.7 (09 May 2022 05:23), Max: 98.8 (08 May 2022 11:13)  HR: 80 (09 May 2022 05:23) (59 - 80)  BP: 115/58 (09 May 2022 05:23) (50/33 - 130/80)  BP(mean): --  RR: 18 (09 May 2022 05:23) (16 - 20)  SpO2: 97% (09 May 2022 05:23) (92% - 99%)    PHYSICAL EXAMINATION:  GENERAL: NAD, average build  HEAD:  Atraumatic, Normocephalic, sinus tenderness on palpation - maxillary   EYES:  conjunctiva and sclera clear  NECK: Supple, No JVD  CHEST/LUNG: Clear to auscultation. Clear to percussion bilaterally; No rales, rhonchi, wheezing, or rubs  HEART: Regular rate and rhythm; No murmurs, rubs, or gallops  ABDOMEN: Soft, Nontender, Nondistended; Bowel sounds present  NERVOUS SYSTEM:  Alert & Oriented X3,    EXTREMITIES:  2+ Peripheral Pulses, No clubbing, cyanosis, or edema, pain on palpation o right calf  SKIN: warm dry                          9.6    10.24 )-----------( 456      ( 09 May 2022 06:24 )             30.2     05-09    134<L>  |  100  |  34<H>  ----------------------------<  82  4.5   |  26  |  1.37<H>    Ca    8.8      09 May 2022 06:24  Phos  5.1     05-09  Mg     2.6     05-09    TPro  6.7  /  Alb  3.0<L>  /  TBili  0.5  /  DBili  x   /  AST  16  /  ALT  15  /  AlkPhos  100  05-09    LIVER FUNCTIONS - ( 09 May 2022 06:24 )  Alb: 3.0 g/dL / Pro: 6.7 g/dL / ALK PHOS: 100 U/L / ALT: 15 U/L DA / AST: 16 U/L / GGT: x               PT/INR - ( 09 May 2022 06:24 )   PT: 11.6 sec;   INR: 0.98 ratio         PTT - ( 09 May 2022 06:24 )  PTT:26.7 sec    CAPILLARY BLOOD GLUCOSE      RADIOLOGY & ADDITIONAL TESTS:

## 2022-05-09 NOTE — PROGRESS NOTE ADULT - PROVIDER SPECIALTY LIST ADULT
Hospitalist
Internal Medicine
Internal Medicine
Gastroenterology
Internal Medicine
Internal Medicine

## 2022-05-09 NOTE — CHART NOTE - NSCHARTNOTEFT_GEN_A_CORE
As patient did not tolerate Mag citrate, will discontinue all the enemas.    Also, patient is found to have new LYNN. Dehydration vs CHF-related. Will order urine lytes.
Patient seen and examined earlier this evening    Vital Signs Last 24 Hrs  T(C): 36.3 (05 May 2022 19:30), Max: 37.1 (04 May 2022 20:23)  T(F): 97.4 (05 May 2022 19:30), Max: 98.7 (04 May 2022 20:23)  HR: 64 (05 May 2022 19:30) (64 - 82)  BP: 145/71 (05 May 2022 19:30) (128/58 - 146/75)  BP(mean): 96 (05 May 2022 19:30) (87 - 96)  RR: 18 (05 May 2022 19:30) (17 - 18)  SpO2: 98% (05 May 2022 19:30) (97% - 100%)    Labs:                        8.4    6.87  )-----------( 415      ( 05 May 2022 06:23 )             25.4   05-05    133<L>  |  103  |  20<H>  ----------------------------<  73  4.2   |  23  |  0.83    Ca    8.9      05 May 2022 06:23  Phos  3.9     05-05  Mg     1.8     05-05    TPro  6.6  /  Alb  3.1<L>  /  TBili  1.1  /  DBili  x   /  AST  15  /  ALT  14  /  AlkPhos  108  05-05      Plan:  Repeat CBC; if H/H stable advance diet to soft  GI consult;<EGD/Colon early next wek as Patient on Plavix  Spoke with Son Gerardo and then with Son Santi over the phone and updated presentation, current clinical status
89 yo female, [place], ambulates w/ walker, w/ Hx CHFpEF w/ GIIDD, CVA (>15yrs on Plavix), DM (no meds to avoid hypoglycemia),HTN,  Fe def anemia, possible rectal mass (CT in march showed: possible mural thickening of the rectum may be due to   underdistention, proctitis, or rectal cancer) presented with SOB, LE swelling, generalized weakness.  In ED: Placed on supplemental oxygen, Hg 6.8,  Pro-, CXR:  pulmonary congestion.  Received 80mg IV lasix in ED.  LE doppler neg for DVT.   Echo showed EF 58%, Grade II diastolic dysfunction (moderate)     Pt was admitted for acute decompensated HFpEF likely due to medications noncompliance   symptomatic anemia 2/2 suspected GIB likely due to rectal mass   Received 2 units of PRBC with improvement in Hg to 8.4   Pt was seen at bedside, alert, oriented, states breathing is better, titrated off the oxygen  Pt co mild headache, given tylenol   VS stable               OBJECTIVE:  Vital Signs Last 24 Hrs  T(C): 36.4 (05 May 2022 07:15), Max: 37.1 (04 May 2022 20:23)  T(F): 97.6 (05 May 2022 07:15), Max: 98.7 (04 May 2022 20:23)  HR: 75 (05 May 2022 07:15) (65 - 82)  BP: 136/69 (05 May 2022 07:15) (128/58 - 146/75)  BP(mean): --  RR: 18 (05 May 2022 07:15) (17 - 18)  SpO2: 100% (05 May 2022 07:15) (97% - 100%)    FOCUSED PHYSICAL EXAM:  Neuro: awake, alert, oriented x 3. No neuro deficit  Cardiovascular: Pulses +2 B/L in lower and upper extremities, HR regular, BP stable, +2 LE edema.  Respiratory: Respirations regular, unlabored, breath sounds slightly decreased bilaterally   GI: Abdomen soft, non-tender, positive bowel sounds.  : no bladder distention noted. No complaints at this time.  Skin: Dry, intact, no bruising, no diaphoresis.        PLAN:     - monitor on telemetry   - continue lasix 40 mg IV   - monitor CBC   - cardiology consutled: Dr Tovar   - GI consulted Dr Roque   - IV PPI BID  - clear liquid diet for now   - continue home medications   - h/o CVA: on plavix , will hold in setting possible GI bleed.  - Hold amlodipine, monitor vitals  - SCDs for dvt ppx      FOLLOW UP / RESULT:

## 2022-05-09 NOTE — DISCHARGE NOTE PROVIDER - NSDCCPCAREPLAN_GEN_ALL_CORE_FT
PRINCIPAL DISCHARGE DIAGNOSIS  Diagnosis: Symptomatic anemia  Assessment and Plan of Treatment: You have a suspicious rectal mass that was discovered the last time your were here. You came in because of fatigue and weakness and your blood was discovered to be low at your primary care Doctor's office. You had also run out of your lasix and iron after being discharged recently for anemia and heart failure, so due to these concerns you were admitted for further monitoring. Your blood was found to be low, requiring a blood transfusion. You received a unit of blood, and your clinical picture improved, now you are no long feeling weak and lethargic. You were prepared to be evaluated by a GI doctor, by endoscopy and colonoscopy, however, your family thought that a colonoscopy was not necessary. You therefore underwent a sigmoidoscopy which is simpler, to evaluate the rectal mass, and endoscopy. You were found to have ------- . You are therfore advised to ---------------      SECONDARY DISCHARGE DIAGNOSES  Diagnosis: Medication overuse headache  Assessment and Plan of Treatment: You came in for anemia and were given blood and you felt better than when you came in. However, you complained of a headache that was relentless. This headache you endorsed is chronic, and you mentioned that you take extra strength tylenol, two tablets three times a day, which makes your headache suspicious for a medication overuse headache. you were therefore treated with IV tylenol once, and monitored. Your headache eventually went away. You are advised to only take tylenol sparingly for headaches. Try not to exceed 2g a day, and try not take it for 8 days straight.  Please return to the ED if you have any fevers,  chills,  shortness of breath , nausea, vomiting , syncope, or presyncope or diarrhea.    Diagnosis: Sensory neuropathy  Assessment and Plan of Treatment: You came in for anemia and were also treated for headache. In this time, you complained of tingling and numbness in the fingertips, as well as tenderness while touching your calf. It was thouht that you have nerve problem, decreased nerve function that is common around your age, so you wer prescribed gabapentin, a nerve medication, to take 200mg every day at bed time which you are to contiue taking when you go home.      Diagnosis: Chronic diastolic congestive heart failure  Assessment and Plan of Treatment: You came in with anemia, and were given blood. It was noted that you also have a heart relaxation failure, causing your blood to back up on your lungs,  for which you were treated on your last admission with a water pill called "lasix" which you ran out of. Now you are being discharged on more lasix, however it is decided that you do not need to take it everyday, neither do you need a lot of it. Please take lasix 20mg on Monday wednesday and firnda, once, and also as needed if you ever develop shortness of breath.  Follow up with your primary doctor for contimued management of your heart function.     PRINCIPAL DISCHARGE DIAGNOSIS  Diagnosis: Symptomatic anemia  Assessment and Plan of Treatment: You have a suspicious rectal mass that was discovered the last time your were here. You came in because of fatigue and weakness and your blood was discovered to be low at your primary care Doctor's office. You had also run out of your lasix and iron after being discharged recently for anemia and heart failure, so due to these concerns you were admitted for further monitoring. Your blood was found to be low, requiring a blood transfusion. You received a unit of blood, and your clinical picture improved, now you are no long feeling weak and lethargic. You were prepared to be evaluated by a GI doctor, by endoscopy and colonoscopy, however, your family thought that a colonoscopy was not necessary. You therefore underwent a sigmoidoscopy which is simpler, to evaluate the rectal mass, and endoscopy. You were found to have ------- . You are therfore advised to ---------------      SECONDARY DISCHARGE DIAGNOSES  Diagnosis: Medication overuse headache  Assessment and Plan of Treatment: You came in for anemia and were given blood and you felt better than when you came in. However, you complained of a headache that was relentless. This headache you endorsed is chronic, and you mentioned that you take extra strength tylenol, two tablets three times a day, which makes your headache suspicious for a medication overuse headache. you were therefore treated with IV tylenol once, and monitored. Your headache eventually went away. You are advised to only take tylenol sparingly for headaches. Try not to exceed 2g a day, and try not take it for 8 days straight.  Please return to the ED if you have any fevers,  chills,  shortness of breath , nausea, vomiting , syncope, or presyncope or diarrhea.    Diagnosis: Sensory neuropathy  Assessment and Plan of Treatment: You came in for anemia and were also treated for headache. In this time, you complained of tingling and numbness in the fingertips, as well as tenderness while touching your calf. It was thouht that you have nerve problem, decreased nerve function that is common around your age, so you wer prescribed gabapentin, a nerve medication, to take 200mg every day at bed time which you are to contiue taking when you go home. Please also follow with your PCP, your lab tests of nerve markers: vitamin B12, homocystein, and MMA.      Diagnosis: Chronic diastolic congestive heart failure  Assessment and Plan of Treatment: You came in with anemia, and were given blood. It was noted that you also have a heart relaxation failure, causing your blood to back up on your lungs,  for which you were treated on your last admission with a water pill called "lasix" which you ran out of. Now you are being discharged on more lasix, however it is decided that you do not need to take it everyday, neither do you need a lot of it. Please take lasix 20mg on Monday wednesday and firnda, once, and also as needed if you ever develop shortness of breath.  Follow up with your primary doctor for contimued management of your heart function.     PRINCIPAL DISCHARGE DIAGNOSIS  Diagnosis: Symptomatic anemia  Assessment and Plan of Treatment: You have a suspicious rectal mass that was discovered the last time your were here. You came in because of fatigue and weakness and your blood was discovered to be low at your primary care Doctor's office. You had also run out of your lasix and iron after being discharged recently for anemia and heart failure, so due to these concerns you were admitted for further monitoring. Your blood was found to be low, requiring a blood transfusion. You received a unit of blood, and your clinical picture improved, now you are no long feeling weak and lethargic. You were prepared to be evaluated by a GI doctor, by endoscopy and colonoscopy, however, your family thought that a colonoscopy was not necessary. You therefore underwent a sigmoidoscopy which is simpler, to evaluate the rectal mass, and endoscopy. You were found to have hemorrhoids and a polyp . The rest of your sigmoidoscopy was not completed because you had a poor prep due to weakness. You were not able to sufficiently clean out your bowel. You are therfore advised to to take protonix twice a day, and take clear liquids today and advance your diet tomorrow as tolerated, and follow up with Dr. Roque to re-evaluate the need of another sigmoidoscopy.      SECONDARY DISCHARGE DIAGNOSES  Diagnosis: Medication overuse headache  Assessment and Plan of Treatment: You came in for anemia and were given blood and you felt better than when you came in. However, you complained of a headache that was relentless. This headache you endorsed is chronic, and you mentioned that you take extra strength tylenol, two tablets three times a day, which makes your headache suspicious for a medication overuse headache. you were therefore treated with IV tylenol once, and monitored. Your headache eventually went away. You are advised to only take tylenol sparingly for headaches. Try not to exceed 2g a day, and try not take it for 8 days straight.  Please return to the ED if you have any fevers,  chills,  shortness of breath , nausea, vomiting , syncope, or presyncope or diarrhea.    Diagnosis: Sensory neuropathy  Assessment and Plan of Treatment: You came in for anemia and were also treated for headache. In this time, you complained of tingling and numbness in the fingertips, as well as tenderness while touching your calf. It was thouht that you have nerve problem, decreased nerve function that is common around your age, so you wer prescribed gabapentin, a nerve medication, to take 200mg every day at bed time which you are to contiue taking when you go home. Please also follow with your PCP, your lab tests of nerve markers: vitamin B12, homocystein, and MMA.      Diagnosis: Chronic diastolic congestive heart failure  Assessment and Plan of Treatment: You came in with anemia, and were given blood. It was noted that you also have a heart relaxation failure, causing your blood to back up on your lungs,  for which you were treated on your last admission with a water pill called "lasix" which you ran out of. Now you are being discharged on more lasix, however it is decided that you do not need to take it everyday, neither do you need a lot of it. Please take lasix 20mg on Monday wednesday and firnda, once, and also as needed if you ever develop shortness of breath.  Follow up with your primary doctor for contimued management of your heart function.     PRINCIPAL DISCHARGE DIAGNOSIS  Diagnosis: Symptomatic anemia  Assessment and Plan of Treatment: You have a suspicious rectal mass that was discovered the last time your were here. You came in because of fatigue and weakness and your blood was discovered to be low at your primary care Doctor's office. You had also run out of your lasix and iron after being discharged recently for anemia and heart failure, so due to these concerns you were admitted for further monitoring. Your blood was found to be low, requiring a blood transfusion. You received a unit of blood, and your clinical picture improved, now you are no long feeling weak and lethargic. You were prepared to be evaluated by a GI doctor, by endoscopy and colonoscopy, however, your family thought that a colonoscopy was not necessary. You therefore underwent a sigmoidoscopy which is simpler, to evaluate the rectal mass, and endoscopy. You were found to have hemorrhoids and a polyp as well as dilated blood vessels that were cauterized. The rest of your sigmoidoscopy was not completed because you had a poor prep due to weakness. You were not able to sufficiently clean out your bowel. You are therfore advised to to take protonix twice a day for 4 weeks,and then once daily for a month until you see Dr. bobby. Please clear liquids today and advance your diet tomorrow as tolerated, and follow up with Dr. Bobby to re-evaluate the need of another sigmoidoscopy.      SECONDARY DISCHARGE DIAGNOSES  Diagnosis: Medication overuse headache  Assessment and Plan of Treatment: You came in for anemia and were given blood and you felt better than when you came in. However, you complained of a headache that was relentless. This headache you endorsed is chronic, and you mentioned that you take extra strength tylenol, two tablets three times a day, which makes your headache suspicious for a medication overuse headache. you were therefore treated with IV tylenol once, and monitored. Your headache eventually went away. You are advised to only take tylenol sparingly for headaches. Try not to exceed 2g a day, and try not take it for 8 days straight.  Please return to the ED if you have any fevers,  chills,  shortness of breath , nausea, vomiting , syncope, or presyncope or diarrhea.    Diagnosis: Sensory neuropathy  Assessment and Plan of Treatment: You came in for anemia and were also treated for headache. In this time, you complained of tingling and numbness in the fingertips, as well as tenderness while touching your calf. It was thouht that you have nerve problem, decreased nerve function that is common around your age, so you wer prescribed gabapentin, a nerve medication, to take 200mg every day at bed time which you are to contiue taking when you go home. Please also follow with your PCP, your lab tests of nerve markers: vitamin B12, homocystein, and MMA.      Diagnosis: Chronic diastolic congestive heart failure  Assessment and Plan of Treatment: You came in with anemia, and were given blood. It was noted that you also have a heart relaxation failure, causing your blood to back up on your lungs,  for which you were treated on your last admission with a water pill called "lasix" which you ran out of. Now you are being discharged on more lasix, however it is decided that you do not need to take it everyday, neither do you need a lot of it. Please take lasix 20mg on Monday wednesday and firnda, once, and also as needed if you ever develop shortness of breath.  Follow up with your primary doctor for contimued management of your heart function.     PRINCIPAL DISCHARGE DIAGNOSIS  Diagnosis: Symptomatic anemia  Assessment and Plan of Treatment: You have a suspicious rectal mass that was discovered the last time your were here. You came in because of fatigue and weakness and your blood was discovered to be low at your primary care Doctor's office. You had also run out of your lasix and iron after being discharged recently for anemia and heart failure, so due to these concerns you were admitted for further monitoring. Your blood was found to be low, requiring a blood transfusion. You received a unit of blood, and your clinical picture improved, now you are no long feeling weak and lethargic. You were prepared to be evaluated by a GI doctor, by endoscopy and colonoscopy, however, your family thought that a colonoscopy was not necessary. You therefore underwent a sigmoidoscopy which is simpler, to evaluate the rectal mass, and endoscopy. You were found to have hemorrhoids and a polyp as well as dilated blood vessels that were cauterized. The rest of your sigmoidoscopy was not completed because you had a poor prep due to weakness. You were not able to sufficiently clean out your bowel. You are therfore advised to to take protonix twice a day for 4 weeks,and then once daily for a month until you see Dr. bobby. Please clear liquids to full liquids tonight,  and advance your diet tomorrow as tolerated, and follow up with Dr. Bobby in two weeks to re-evaluate the need of another sigmoidoscopy.      SECONDARY DISCHARGE DIAGNOSES  Diagnosis: Medication overuse headache  Assessment and Plan of Treatment: You came in for anemia and were given blood and you felt better than when you came in. However, you complained of a headache that was relentless. This headache you endorsed is chronic, and you mentioned that you take extra strength tylenol, two tablets three times a day, which makes your headache suspicious for a medication overuse headache. you were therefore treated with IV tylenol once, and monitored. Your headache eventually went away. You are advised to only take tylenol sparingly for headaches. Try not to exceed 2g a day, and try not take it for 8 days straight.  Please return to the ED if you have any fevers,  chills,  shortness of breath , nausea, vomiting , syncope, or presyncope or diarrhea.    Diagnosis: Sensory neuropathy  Assessment and Plan of Treatment: You came in for anemia and were also treated for headache. In this time, you complained of tingling and numbness in the fingertips, as well as tenderness while touching your calf. It was thouht that you have nerve problem, decreased nerve function that is common around your age, so you wer prescribed gabapentin, a nerve medication, to take 200mg every day at bed time which you are to contiue taking when you go home. Please also follow with your PCP, your lab tests of nerve markers: vitamin B12, homocystein, and MMA.      Diagnosis: Chronic diastolic congestive heart failure  Assessment and Plan of Treatment: You came in with anemia, and were given blood. It was noted that you also have a heart relaxation failure, causing your blood to back up on your lungs,  for which you were treated on your last admission with a water pill called "lasix" which you ran out of. Now you are being discharged on more lasix, however it is decided that you do not need to take it everyday, neither do you need a lot of it. Please take lasix 20mg on Monday wednesday and firnda, once, and also as needed if you ever develop shortness of breath.  Follow up with your primary doctor in two weeks for contimued management of your heart function.     PRINCIPAL DISCHARGE DIAGNOSIS  Diagnosis: Symptomatic anemia  Assessment and Plan of Treatment: You came in because of fatigue and weakness and your blood was discovered to be low at your primary care Doctor's office. Your hemoglobin was noted to be around 7 gm.   You had also run out of your lasix and iron after being discharged recently for anemia and heart failure, so due to these concerns you were admitted for further monitoring. Your blood was found to be low, requiring a blood transfusion. You received a unit of  PRBC (blood), and your clinical picture improved, now you are no long feeling weak and lethargic. You were prepared to be evaluated by a GI doctor, by endoscopy and colonoscopy, however, your family thought that a colonoscopy was not necessary.   Due to being on Plavix, Endoscopy was scheduled adfter 5 days as per GI request.   You also have mural thickening in the rectum seen on CT scan last admission in March. You therefore underwent a sigmoidoscopy which is simpler, to evaluate the rectal lesion, and endoscopy. You were found to have internal  hemorrhoids on Sigmoidoscopy.The rest of your sigmoidoscopy was not completed because you had a poor prep due to weakness. You were not able to sufficiently clean out your bowel.   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   PLEASE TAKE PROTONIX 40 MG TWICE DAILY 30 MINS BEFORE BREAKFAST AND DINNER FOR 4 WEEKS THEN REDUCE TO ONCE DAILY BEFORE BREAKFAST. PLEASE FOLLOW UP WITH GASTRO DR. YAO RICHARDSON IN 2 WEEKS AS PER INFORMATION PROVIDED.   AS PER GI, YOU WAS ADVISED TO TAKE ONLY FULL LIQUID DIET UNTIL TOMORROW AND IF DOING WELL ADVANCE TO SOFT FOOD TOMORROW.   PLEASE CHECK BLOOD WORK FOR CBC IN 2 WEEKS WITH YOUR PCP DR. DICKERSON WHO WAS UPDATED ABOUT YOUR HOSPITAL STAY AND PLANNED      SECONDARY DISCHARGE DIAGNOSES  Diagnosis: Sensory neuropathy  Assessment and Plan of Treatment: You came in for anemia and were also treated for headache. In this time, you complained of tingling and numbness in the fingertips, as well as tenderness while touching your calf. It was thouht that you have nerve problem, decreased nerve function that is common around your age, so you wer prescribed gabapentin, a nerve medication, to take 200 mg every day at bed time which you are to contiue taking when you go home. Please also follow with your PCP, your lab tests of nerve markers: vitamin B12, homocystein, and Methyl malonic acid were normal.       Diagnosis: Medication overuse headache  Assessment and Plan of Treatment: You came in for anemia and were given blood and you felt better than when you came in. However, you complained of a headache that was relentless. This headache you endorsed is chronic, and you mentioned that you take extra strength tylenol, two tablets three times a day, which makes your headache suspicious for a medication overuse headache. you were therefore treated with IV tylenol once, and monitored. Your headache eventually went away. You are advised to only take tylenol sparingly for headaches. Try not to exceed 2g a day, and try not take it for 8 days straight.  Please return to the ED if you have any fevers,  chills,  shortness of breath , nausea, vomiting , syncope, or presyncope or diarrhea.    Diagnosis: Chronic diastolic congestive heart failure  Assessment and Plan of Treatment: You came in with anemia, and were given blood. It was noted that you also have a heart relaxation failure, causing your blood to back up on your lungs,  for which you were treated on your last admission with a water pill called "lasix" which you ran out of. Now you are being discharged on LASIX 20 MG, however it is decided that you do not need to take it everyday, neither do you need a lot of it.   PLEASE TAKE LASIX 20 MG ONCE DAILY IN THE MORNING MONDAYS, WEDNESDAYS AND FRIDAYS REGULARLY; YOU MAY TAKE AN EXTRA DOSE ON OTHER DAYS IF ANY SHORTNESS OF BREATH OR INCREASED LEG SWELLING  Follow up with your primary doctor in two weeks for contimued management of your heart function.    Diagnosis: Hyponatremia  Assessment and Plan of Treatment: You have a hisotry of slightly low sodium on outpatient labs possibly from diuretic (Lasix/ Furosemide). Your sodium levels remained stable during hospital stay on 20 mg Lasix from 131 to 134.   PLEASE CHECK BMP FOR SODIUM, POTASSIUM AND KIDNEY FUNCTION WITH PCP IN TWO WEEKS     PRINCIPAL DISCHARGE DIAGNOSIS  Diagnosis: Symptomatic anemia  Assessment and Plan of Treatment: You came in because of fatigue and weakness and your blood was discovered to be low at your primary care Doctor's office. Your hemoglobin was noted to be around 7 gm.   You had also run out of your lasix and iron after being discharged recently for anemia and heart failure, so due to these concerns you were admitted for further monitoring. Your blood was found to be low, requiring a blood transfusion. You received a unit of  PRBC (blood), and your clinical picture improved, now you are no long feeling weak and lethargic. You were prepared to be evaluated by a GI doctor, by endoscopy and colonoscopy, however, your family thought that a colonoscopy was not necessary.   Due to being on Plavix, Endoscopy was scheduled adfter 5 days as per GI request.   You also have mural thickening in the rectum seen on CT scan last admission in March. You therefore underwent a sigmoidoscopy which is simpler, to evaluate the rectal lesion, and endoscopy. You were found to have internal  hemorrhoids on Sigmoidoscopy.The rest of your sigmoidoscopy was not completed because you had a poor prep due to weakness. You were not able to sufficiently clean out your bowel.   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   Please note that your stool may be dark from Iron tablets which you should take once daily. Avoid constipation to prevent rectal internal hemorrhoids from  If constipated you may take Miralax avaialble over the counter as needed.   PLEASE TAKE PROTONIX 40 MG TWICE DAILY 30 MINS BEFORE BREAKFAST AND DINNER FOR 4 WEEKS THEN REDUCE TO ONCE DAILY BEFORE BREAKFAST. PLEASE FOLLOW UP WITH GASTRO DR. YAO RICHARDSON IN 2 WEEKS AS PER INFORMATION PROVIDED.   AS PER GI, YOU WAS ADVISED TO TAKE ONLY FULL LIQUID DIET UNTIL TOMORROW AND IF DOING WELL ADVANCE TO SOFT FOOD TOMORROW.   PLEASE CHECK BLOOD WORK FOR CBC IN 2 WEEKS WITH YOUR PCP DR. DICKERSON WHO WAS UPDATED ABOUT YOUR HOSPITAL STAY AND PLANNED      SECONDARY DISCHARGE DIAGNOSES  Diagnosis: Sensory neuropathy  Assessment and Plan of Treatment: You came in for anemia and were also treated for headache. In this time, you complained of tingling and numbness in the fingertips, as well as tenderness while touching your calf. It was thouht that you have nerve problem, decreased nerve function that is common around your age, so you wer prescribed gabapentin, a nerve medication, to take 200 mg every day at bed time which you are to contiue taking when you go home. Please also follow with your PCP, your lab tests of nerve markers: vitamin B12, homocystein, and Methyl malonic acid were normal.       Diagnosis: Medication overuse headache  Assessment and Plan of Treatment: You came in for anemia and were given blood and you felt better than when you came in. However, you complained of a headache that was relentless. This headache you endorsed is chronic, and you mentioned that you take extra strength tylenol, two tablets three times a day, which makes your headache suspicious for a medication overuse headache. you were therefore treated with IV tylenol once, and monitored. Your headache eventually went away. You are advised to only take tylenol sparingly for headaches. Try not to exceed 2g a day, and try not take it for 8 days straight.  Please return to the ED if you have any fevers,  chills,  shortness of breath , nausea, vomiting , syncope, or presyncope or diarrhea.    Diagnosis: Chronic diastolic congestive heart failure  Assessment and Plan of Treatment: You came in with anemia, and were given blood. It was noted that you also have a heart relaxation failure, causing your blood to back up on your lungs,  for which you were treated on your last admission with a water pill called "lasix" which you ran out of. Now you are being discharged on LASIX 20 MG, however it is decided that you do not need to take it everyday, neither do you need a lot of it.   PLEASE TAKE LASIX 20 MG ONCE DAILY IN THE MORNING MONDAYS, WEDNESDAYS AND FRIDAYS REGULARLY; YOU MAY TAKE AN EXTRA DOSE ON OTHER DAYS IF ANY SHORTNESS OF BREATH OR INCREASED LEG SWELLING  Follow up with your primary doctor in two weeks for contimued management of your heart function.    Diagnosis: Hyponatremia  Assessment and Plan of Treatment: You have a hisotry of slightly low sodium on outpatient labs possibly from diuretic (Lasix/ Furosemide). Your sodium levels remained stable during hospital stay on 20 mg Lasix from 131 to 134.   PLEASE CHECK BMP FOR SODIUM, POTASSIUM AND KIDNEY FUNCTION WITH PCP IN TWO WEEKS    Diagnosis: History of CVA (cerebrovascular accident)  Assessment and Plan of Treatment: You have a remote history of stroke and on Plavix and Statin.   Due to suspected bleed and need for Endoscopy Plavix was held for 5 days in hospital  AS PER GASTROENTEROLOGIST DR. DYLAN, PLEAS CONTINUE TO HOLD PLAVIX FOR 2 MORE DAYS AS YOUR AVNM WAS CAUTERIZED DURING ENDOSCOPY. YOU MAY RESUME IT ON THURSDAY.     PRINCIPAL DISCHARGE DIAGNOSIS  Diagnosis: Symptomatic anemia  Assessment and Plan of Treatment: You came in because of fatigue and weakness and your blood was discovered to be low at your primary care Doctor's office. Your hemoglobin was noted to be around 7 gm.   You had also run out of your lasix and iron after being discharged recently for anemia and heart failure, so due to these concerns you were admitted for further monitoring. Your blood was found to be low, requiring a blood transfusion. You received a unit of  PRBC (blood), and your clinical picture improved, now you are no long feeling weak and lethargic. You were prepared to be evaluated by a GI doctor, by endoscopy and colonoscopy, however, your family thought that a colonoscopy was not necessary.   Due to being on Plavix, Endoscopy was scheduled adfter 5 days as per GI request.   You also have mural thickening in the rectum seen on CT scan last admission in March. You therefore underwent a sigmoidoscopy which is simpler, to evaluate the rectal lesion, and endoscopy. You were found to have internal  hemorrhoids on Sigmoidoscopy.The rest of your sigmoidoscopy was not completed because you had a poor prep due to weakness. You were not able to sufficiently clean out your bowel.   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   Please note that your stool may be dark from Iron tablets which you should take once daily. Avoid constipation to prevent rectal internal hemorrhoids from  If constipated you may take Miralax avaialble over the counter as needed.   PLEASE TAKE PROTONIX 40 MG TWICE DAILY 30 MINS BEFORE BREAKFAST AND DINNER FOR 4 WEEKS THEN REDUCE TO ONCE DAILY BEFORE BREAKFAST. PLEASE FOLLOW UP WITH GASTRO DR. YAO RICHARDSON IN 2 WEEKS AS PER INFORMATION PROVIDED.   AS PER GI, YOU WAS ADVISED TO TAKE ONLY FULL LIQUID DIET UNTIL TOMORROW AND IF DOING WELL ADVANCE TO SOFT FOOD TOMORROW.   PLEASE CHECK BLOOD WORK FOR CBC IN 2 WEEKS WITH YOUR PCP DR. DICKERSON WHO WAS UPDATED ABOUT YOUR HOSPITAL STAY AND PLANNED      SECONDARY DISCHARGE DIAGNOSES  Diagnosis: Sensory neuropathy  Assessment and Plan of Treatment: You came in for anemia and were also treated for headache. In this time, you complained of tingling and numbness in the fingertips, as well as tenderness while touching your calf. It was thouht that you have nerve problem, decreased nerve function that is common around your age, so you wer prescribed gabapentin, a nerve medication, to take 200 mg every day at bed time which you are to contiue taking when you go home. Please also follow with your PCP, your lab tests of nerve markers: vitamin B12, homocystein, and Methyl malonic acid were normal.       Diagnosis: Medication overuse headache  Assessment and Plan of Treatment: You came in for anemia and were given blood and you felt better than when you came in. However, you complained of a headache that was relentless. This headache you endorsed is chronic, and you mentioned that you take extra strength tylenol, two tablets three times a day, which makes your headache suspicious for a medication overuse headache. you were therefore treated with IV tylenol once, and monitored. Your headache eventually went away. You are advised to only take tylenol sparingly for headaches. Try not to exceed 2g a day, and try not take it for 8 days straight.  Please return to the ED if you have any fevers,  chills,  shortness of breath , nausea, vomiting , syncope, or presyncope or diarrhea.    Diagnosis: Chronic diastolic congestive heart failure  Assessment and Plan of Treatment: You came in with anemia, and were given blood. It was noted that you also have a heart relaxation failure, causing your blood to back up on your lungs,  for which you were treated on your last admission with a water pill called "lasix" which you ran out of. Now you are being discharged on LASIX 20 MG, however it is decided that you do not need to take it everyday, neither do you need a lot of it.   PLEASE TAKE LASIX 20 MG ONCE DAILY IN THE MORNING MONDAYS, WEDNESDAYS AND FRIDAYS REGULARLY; YOU MAY TAKE AN EXTRA DOSE ON OTHER DAYS IF ANY SHORTNESS OF BREATH OR INCREASED LEG SWELLING  Follow up with your primary doctor in two weeks for contimued management of your heart function.    Diagnosis: Hyponatremia  Assessment and Plan of Treatment: You have a hisotry of slightly low sodium on outpatient labs possibly from diuretic (Lasix/ Furosemide). Your sodium levels remained stable during hospital stay on 20 mg Lasix from 131 to 134.   PLEASE CHECK BMP FOR SODIUM, POTASSIUM AND KIDNEY FUNCTION WITH PCP IN TWO WEEKS    Diagnosis: History of CVA (cerebrovascular accident)  Assessment and Plan of Treatment: You have a remote history of stroke and on Plavix and Statin.   Due to suspected bleed and need for Endoscopy Plavix was held for 5 days in hospital  AS PER GASTROENTEROLOGIST DR. DYLAN, PLEAS CONTINUE TO HOLD PLAVIX FOR 2 MORE DAYS AS YOUR AVNM WAS CAUTERIZED DURING ENDOSCOPY. YOU MAY RESUME IT ON THURSDAY.    Diagnosis: Exposure to COVID-19 virus  Assessment and Plan of Treatment: You may have had exposure to COVID as you was placed on isolation for few hours untila repeat test was negative. It was a false positive COVID as repeat testing of swab was negative  PLEASE WEAR A MASK FOR FEW DAYS AT HOME WHEN IN CONTACT WITH OTHER FAMILY MEMBERS

## 2022-05-09 NOTE — DISCHARGE NOTE NURSING/CASE MANAGEMENT/SOCIAL WORK - PATIENT PORTAL LINK FT
You can access the FollowMyHealth Patient Portal offered by [facility] by registering at the following website: [email or web address]. By joining [employer]’s FollowMyHealth portal, you will also be able to view your health information using other applications (apps) compatible with our system.

## 2022-05-09 NOTE — PROGRESS NOTE ADULT - PROBLEM SELECTOR PLAN 2
p/w acute CHF exac triggered by med non-compliance and anemia  CHF w/ preserved EF > 58% with GIIDD  - resume Lasix at 40 IV daily: with parameters, ( hold for NPO and hypotension, resume after Scopes)  - resume BP control when hemodynamically stable  CARDIO consult Dr. Tovar
p/w acute CHF exac triggered by med non-compliance and anemia  CHF w/ preserved EF > 58% with GIIDD  - c/w lasix 20mg Lasix po   - echo from 3/2022 noted EF 58% Grade II diastolic dysfunction   - Fernando removed  -f/u TOV
p/w acute CHF exac triggered by med non-compliance and anemia  CHF w/ preserved EF > 58% with GIIDD  - resume Lasix at 40 IV daily  - resume BP control when hemodynamically stable  CARDIO consult Dr. Tovar

## 2022-05-09 NOTE — PROGRESS NOTE ADULT - PROBLEM SELECTOR PLAN 4
On Plavix, stroke over 15 years ago  HOLD Plavix in setting of c/f GIB and GI intervention 5/9/22
Patient comes in with headache   reports using 1000mg of tylenol at home TID  Might be withdrawal/ MOH  Will do tylenol 1000 IV tylenol once  Cocktail: Depakote, benadryl and reglan if no improvement
Patient comes in with headache   reports using 1000mg of tylenol at home TID  Might be withdrawal/ MOH  Will do tylenol 1000 IV tylenol once  Cocktail: Depakote, benadryl and reglan if no improvement  Patient has improvement today

## 2022-05-09 NOTE — DISCHARGE NOTE PROVIDER - CARE PROVIDER_API CALL
Guy Roque)  Gastroenterology; Internal Medicine  [address] [facility] [address] [place] [address]  [place] [address]  Phone: [phone]  Fax: [phone]  Established Patient  Follow Up Time:    Guy Roque)  Gastroenterology; Internal Medicine  [address] [facility] [place] [address]  Phone: [phone]  Fax: [phone]  Established Patient  Follow Up Time:     Chico Betancur  [address] [facility]   [address]  Phone: [phone]  Fax: (   )    -  Established Patient  Follow Up Time:

## 2022-05-09 NOTE — DISCHARGE NOTE PROVIDER - PROVIDER TOKENS
PROVIDER:[TOKEN:[[id number]],ESTABLISHEDPATIENT:[T]] PROVIDER:[TOKEN:[[id number]],ESTABLISHEDPATIENT:[T]],FREE:[LAST:[Gypsy],FIRST:[Chico],PHONE:[phone]],FAX:[(   )    -],ADDRESS:[[address] [place] [address]],ESTABLISHEDPATIENT:[T]]

## 2022-05-09 NOTE — DISCHARGE NOTE NURSING/CASE MANAGEMENT/SOCIAL WORK - NSDCPEFALRISK_GEN_ALL_CORE
For information on Fall & Injury Prevention, visit: [email or web address] OR  [email or web address] OR  https://www.cdc.gov/steadi/patient.html

## 2022-05-09 NOTE — DISCHARGE NOTE PROVIDER - HOSPITAL COURSE
88 yo F, AAOx3 HARD OF HEARING, ambulates w/ walker, w/ Hx CHFpEF w/ GIIDD, CVA (>15yrs on Plavix), DM (no meds to avoid hypoglycemia), Fe def anemia, possible rectal mass and HTN who was BIB family for shortness of breath and abnormal labs at PCP office. Ms. Tejada was admitted on 3/3 - 3/9 for AHRF 2/2 fluid overload with new diagnosis of CHF and Fe def anemia. CT abd showed possible rectal mass but patient and family deferred to follow up with GI Dr. Roque as oupatient for scope. Since discharge home increasingly short of breath with increased leg swelling and fatigue also reports running out of meds:  iron or lasix 40 that had been given po daily for 5 days. Was seen by PCP 4 days ago to have labs drawn and was referred 5/4 to come to the hospital for a critical hemoglobin. Ms. Tejada has not yet been able to follow up with cardiology or GI as outpatient due to long waits for appointment availability.    In the ED: HD stable , Exam: benign, Labs: with hgb 6.8, some elevation in BNP 900s  Patient was admitted for symptomatic anemia, received 1 u PRBC and was for GI evaluation with colonoscopy 5/9/2022.    Family declined colonoscopy, agreed to sigmoidoscopy:   She was nauseous and hypotensive after the prep with mag citrate, could not get full prep with enema  She has no complaints the next morning, received one 500cc bolus which improved BP, and Cr.   Tolerated endoscopy which showed: ---------------  Tolerated sigmoidoscopy which showed:-------------    Patient is to be discharged     For HFpEF patient is being dc on lasix 3times a week.   Patient complained of chronic headaches for which she was taking 1000mg of tylenol TID: - was diagnosed with Medicatio overuse headache, received IV tylenol and had relief.  Patient complained of sensory neuropathy in the finger tips, and tenderness to palpation in the calf (right) DVT ruled out, patient was started on gabapentin 200mg at bed time: to continue on dc.    Patient is currently stable for discharge, labs and vitals medically optimized.  Please refer to the patient's chart for more details as this is only a brief summary.     88 yo F, AAOx3 HARD OF HEARING, ambulates w/ walker, w/ Hx CHFpEF w/ GIIDD, CVA (>15yrs on Plavix), DM (no meds to avoid hypoglycemia), Fe def anemia, possible rectal mass and HTN who was BIB family for shortness of breath and abnormal labs at PCP office. Ms. Tejada was admitted on 3/3 - 3/9 for AHRF 2/2 fluid overload with new diagnosis of CHF and Fe def anemia. CT abd showed possible rectal mass but patient and family deferred to follow up with GI Dr. Roque as oupatient for scope. Since discharge home increasingly short of breath with increased leg swelling and fatigue also reports running out of meds:  iron or lasix 40 that had been given po daily for 5 days. Was seen by PCP 4 days ago to have labs drawn and was referred 5/4 to come to the hospital for a critical hemoglobin. Ms. Tejada has not yet been able to follow up with cardiology or GI as outpatient due to long waits for appointment availability.    In the ED: HD stable , Exam: benign, Labs: with hgb 6.8, some elevation in BNP 900s  Patient was admitted for symptomatic anemia, received 1 u PRBC and was for GI evaluation with colonoscopy 5/9/2022.    Family declined colonoscopy, agreed to sigmoidoscopy:   She was nauseous and hypotensive after the prep with mag citrate, could not get full prep with enema  She has no complaints the next morning, received one 500cc bolus which improved BP, and Cr.   Tolerated endoscopy which showed: ---------------  Tolerated sigmoidoscopy which showed:-------------    Patient is to be discharged     For HFpEF patient is being dc on lasix 3times a week.   Patient complained of chronic headaches for which she was taking 1000mg of tylenol TID: - was diagnosed with Medicatio overuse headache, received IV tylenol and had relief.  Patient complained of sensory neuropathy in the finger tips, and tenderness to palpation in the calf (right) DVT ruled out, patient was started on gabapentin 200mg at bed time: to continue on dc. MMA, B12 and homocystine levels can be followed with PCP once resulted.    Patient is currently stable for discharge, labs and vitals medically optimized.  Please refer to the patient's chart for more details as this is only a brief summary.     88 yo F, AAOx3 HARD OF HEARING, ambulates w/ walker, w/ Hx CHFpEF w/ GIIDD, CVA (>15yrs on Plavix), DM (no meds to avoid hypoglycemia), Fe def anemia, possible rectal mass and HTN who was BIB family for shortness of breath and abnormal labs at PCP office. Ms. Tejada was admitted on 3/3 - 3/9 for AHRF 2/2 fluid overload with new diagnosis of CHF and Fe def anemia. CT abd showed possible rectal mass but patient and family deferred to follow up with GI Dr. Roque as oupatient for scope. Since discharge home increasingly short of breath with increased leg swelling and fatigue also reports running out of meds:  iron or lasix 40 that had been given po daily for 5 days. Was seen by PCP 4 days ago to have labs drawn and was referred 5/4 to come to the hospital for a critical hemoglobin. Ms. Tejada has not yet been able to follow up with cardiology or GI as outpatient due to long waits for appointment availability.    In the ED: HD stable , Exam: benign, Labs: with hgb 6.8, some elevation in BNP 900s  Patient was admitted for symptomatic anemia, received 1 u PRBC and was for GI evaluation with colonoscopy 5/9/2022.    Family declined colonoscopy, agreed to sigmoidoscopy:   She was nauseous and hypotensive after the prep with mag citrate, could not get full prep with enema  She has no complaints the next morning, received one 500cc bolus which improved BP, and Cr.   Tolerated endoscopy which showed: 8mm polyp in GE junction  Tolerated sigmoidoscopy which showed: internal and external hemorrhoids, and had to be aborted due to poor prep. To follow up with Dr. Roque OP for further management.  Patient is to be discharged on PPI BID, on clear liquids, to start solids tomorrow as tolerated.    For HFpEF patient is being dc on lasix 3times a week.   Patient complained of chronic headaches for which she was taking 1000mg of tylenol TID: - was diagnosed with Medicatio overuse headache, received IV tylenol and had relief.  Patient complained of sensory neuropathy in the finger tips, and tenderness to palpation in the calf (right) DVT ruled out, patient was started on gabapentin 200mg at bed time: to continue on dc. MMA, B12 and homocystine levels can be followed with PCP once resulted.    Patient is currently stable for discharge, labs and vitals medically optimized.   Please refer to the patient's chart for more details as this is only a brief summary.     89 yo F, AAOx3 HARD OF HEARING, ambulates w/ walker, w/ Hx CHFpEF w/ GIIDD, CVA (>15yrs on Plavix), DM (no meds to avoid hypoglycemia), Fe def anemia, possible rectal mass and HTN who was BIB family for shortness of breath and abnormal labs at PCP office. Ms. Tejada was admitted on 3/3 - 3/9 for AHRF 2/2 fluid overload with new diagnosis of CHF and Fe def anemia. CT abd showed possible rectal mass but patient and family deferred to follow up with GI Dr. Roque as oupatient for scope. Since discharge home increasingly short of breath with increased leg swelling and fatigue also reports running out of meds:  iron or lasix 40 that had been given po daily for 5 days. Was seen by PCP 4 days ago to have labs drawn and was referred 5/4 to come to the hospital for a critical hemoglobin. Ms. Tjeada has not yet been able to follow up with cardiology or GI as outpatient due to long waits for appointment availability.    In the ED: HD stable , Exam: benign, Labs: with hgb 6.8, some elevation in BNP 900s  Patient was admitted for symptomatic anemia, received 1 u PRBC and was for GI evaluation with colonoscopy 5/9/2022.    Family declined colonoscopy, agreed to sigmoidoscopy:   She was nauseous and hypotensive after the prep with mag citrate, could not get full prep with enema  She has no complaints the next morning, received one 500cc bolus which improved BP, and Cr.   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  Tolerated sigmoidoscopy which showed: internal and external hemorrhoids, and had to be aborted due to poor prep. To follow up with Dr. Roque OP for further management.  Patient is to be discharged on PPI BID for 4 weeks, then once daily until follow up. DC on clear liquids, to start solids tomorrow as tolerated.    For HFpEF patient is being dc on lasix 3times a week.   Patient complained of chronic headaches for which she was taking 1000mg of tylenol TID: - was diagnosed with Medicatio overuse headache, received IV tylenol and had relief.  Patient complained of sensory neuropathy in the finger tips, and tenderness to palpation in the calf (right) DVT ruled out, patient was started on gabapentin 200mg at bed time: to continue on dc. MMA, B12 and homocystine levels can be followed with PCP once resulted.    Patient is currently stable for discharge, labs and vitals medically optimized.   Please refer to the patient's chart for more details as this is only a brief summary.     88 yo F, AAOx3 HARD OF HEARING, ambulates w/ walker, w/ Hx CHFpEF w/ GIIDD, CVA (>15yrs on Plavix), DM (no meds to avoid hypoglycemia), Fe def anemia, possible rectal mass and HTN who was BIB family for shortness of breath and abnormal labs at PCP office. Ms. Tejada was admitted on 3/3 - 3/9 for AHRF 2/2 fluid overload with new diagnosis of CHF and Fe def anemia. CT abd showed possible rectal mass but patient and family deferred to follow up with GI Dr. Roque as oupatient for scope. Since discharge home increasingly short of breath with increased leg swelling and fatigue also reports running out of meds:  iron or lasix 40 that had been given po daily for 5 days. Was seen by PCP 4 days ago to have labs drawn and was referred 5/4 to come to the hospital for a critical hemoglobin. Ms. Tejada has not yet been able to follow up with cardiology or GI as outpatient due to long waits for appointment availability.    In the ED: HD stable , Exam: benign, Labs: with hgb 6.8, some elevation in BNP 900s  Patient was admitted for symptomatic anemia, received 1 u PRBC and was for GI evaluation with colonoscopy 5/9/2022.    Family declined colonoscopy, agreed to sigmoidoscopy:   She was nauseous and hypotensive after the prep with mag citrate, could not get full prep with enema  She has no complaints the next morning, received one 500cc bolus which improved BP, and Cr.   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  Tolerated sigmoidoscopy which showed: internal and external hemorrhoids, and had to be aborted due to poor prep. To follow up with Dr. Roque OP for further management.  Patient is to be discharged on PPI BID for 4 weeks, then once daily until follow up. DC on clear liquids to full liquids , to start solids tomorrow as tolerated.    For HFpEF patient is being dc on lasix 3times a week.   Patient complained of chronic headaches for which she was taking 1000mg of tylenol TID: - was diagnosed with Medicatio overuse headache, received IV tylenol and had relief.  Patient complained of sensory neuropathy in the finger tips, and tenderness to palpation in the calf (right) DVT ruled out, patient was started on gabapentin 200mg at bed time: to continue on dc. MMA, B12 and homocystine levels can be followed with PCP once resulted.    Patient is currently stable for discharge, labs and vitals medically optimized.   Please refer to the patient's chart for more details as this is only a brief summary.

## 2022-05-09 NOTE — PROGRESS NOTE ADULT - REASON FOR ADMISSION
symptomatic anemia

## 2022-05-09 NOTE — PROGRESS NOTE ADULT - PROBLEM SELECTOR PLAN 6
On Plavix, stroke over 15 years ago  HOLD Plavix in setting of c/f GIB
HOLDING amlodipine until BP rises  - vs q 4 hours
On Plavix, stroke over 15 years ago  HOLD Plavix in setting of c/f GIB

## 2022-05-09 NOTE — DISCHARGE NOTE PROVIDER - NSDCMRMEDTOKEN_GEN_ALL_CORE_FT
amLODIPine 5 mg oral tablet: 1 tab(s) orally once a day  clopidogrel 75 mg oral tablet: 1 tab(s) orally once a day  ferrous sulfate 325 mg (65 mg elemental iron) oral tablet: 1 tab(s) orally once a day  furosemide 40 mg oral tablet: 1 tab(s) orally once a day  lisinopril 20 mg oral tablet: 1 tab(s) orally once a day  simvastatin 10 mg oral tablet: 1 tab(s) orally once a day (at bedtime)  Tylenol 325 mg oral capsule: 2 cap(s) orally 3 times a day, As Needed   clopidogrel 75 mg oral tablet: 1 tab(s) orally once a day  ferrous sulfate 325 mg (65 mg elemental iron) oral tablet: 1 tab(s) orally once a day  gabapentin 100 mg oral capsule: 2 cap(s) orally once a day (at bedtime)  Lasix 20 mg oral tablet: 1 tab(s) orally 3 times a week on Monday, Wednesday and Friday   Protonix 40 mg oral delayed release tablet: 1 tab(s) orally 2 times a day   Protonix 40 mg oral delayed release tablet: 1 tab(s) orally once a day   simvastatin 10 mg oral tablet: 1 tab(s) orally once a day (at bedtime)  Tylenol 325 mg oral capsule: 2 cap(s) orally 3 times a day, As Needed   clopidogrel 75 mg oral tablet: 1 tab(s) orally once a day  ferrous sulfate 325 mg (65 mg elemental iron) oral tablet: 1 tab(s) orally once a day  gabapentin 100 mg oral capsule: 2 cap(s) orally once a day (at bedtime)  Lasix 20 mg oral tablet: 1 tab(s) orally 3 times a week on Monday, Wednesday and Friday   and as needed for shortness of breath  Protonix 40 mg oral delayed release tablet: 1 tab(s) orally 2 times a day   simvastatin 10 mg oral tablet: 1 tab(s) orally once a day (at bedtime)   clopidogrel 75 mg oral tablet: 1 tab(s) orally once a day  ferrous sulfate 325 mg (65 mg elemental iron) oral tablet: 1 tab(s) orally once a day  gabapentin 100 mg oral capsule: 2 cap(s) orally once a day (at bedtime)  Lasix 20 mg oral tablet: 1 tab(s) orally 3 times a week on Monday, Wednesday and Friday   and as needed for shortness of breath  Protonix 40 mg oral delayed release tablet: 1 tab(s) orally 2 times a day   Protonix 40 mg oral delayed release tablet: 1 tab(s) orally once a day   simvastatin 10 mg oral tablet: 1 tab(s) orally once a day (at bedtime)

## 2022-05-11 LAB — SURGICAL PATHOLOGY STUDY: SIGNIFICANT CHANGE UP

## 2022-05-12 LAB
HOMOCYSTEINE LEVEL: 40.9 UMOL/L — HIGH
METHYLMALONIC ACID LEVEL: 288 NMOL/L — SIGNIFICANT CHANGE UP (ref 87–318)

## 2022-05-19 NOTE — PATIENT PROFILE ADULT - NSPROHMSYMPCOND_GEN_A_NUR
Left hand tremor controlled  Moderate breakthrough of right hand action and postural tremor  In addition he has had significant changes in speech and swallowing noted since surgery but worsened with increases in stimulation  Approximately 1 hour and 15 min was spent on deep brain stimulation programming  Time spent reviewing prior notes  Reassessed affect monopolar stimulation of contact 1  He did appear to has less speech issues when this was done  Then sense did segments  Much improvement in speech and tremor noted with stimulation at lower amplitudes on 1a-  This was done on higher PW and rate  then initial monopolar survey, pulse width of 90 and frequency 180  See Clinical note within chart and attached clinical sheets for further details as to settings and programming  Left on new active group  Prior group on group a  cardiovascular/diabetes

## 2022-06-06 RX ORDER — PANTOPRAZOLE SODIUM 20 MG/1
1 TABLET, DELAYED RELEASE ORAL
Qty: 30 | Refills: 0
Start: 2022-06-06 | End: 2022-07-05

## 2022-07-19 NOTE — ED PROVIDER NOTE - IV ALTEPLASE ADMIN OUTSIDE HIDDEN
Spoke with patient.  He reports right leg numbness on his thigh with radiation to his toe occasionally.  He denies pain or weakness.  He denies falls or muscle loss.  He denies any medication or activity changes.   Will acquire MRI Lumbar spine w/o (due to seafood allergy).  Will also obtain lab work Serum glucose Serum protein electrophoresis Vitamin B12 level Anti-nuclear antibody Erythrocyte sedimentation rate Rapid plasma reagin (RPR) Glycohemoglobin  : HIV serology, Urine/blood for heavy metals, , Rheumatoid factor  Vitamin B1 (thiamine).  Will also order PT.     Likely peripheral, but potential central etiologies.   Potentially meralgia paresthetica, so advised to minimize use of belts and encouraged weight loss.  Will use above investigations to assess for other etiologies.   Will discuss referral to peripheral neurologist or [facility] depending on results and course.       Pt acknowledged understanding of results and agreement with plan.     show

## 2022-11-09 NOTE — PATIENT PROFILE ADULT - FUNCTIONAL SCREEN CURRENT LEVEL: SWALLOWING (IF SCORE 2 OR MORE FOR ANY ITEM, CONSULT REHAB SERVICES), MLM)
Patient called in stating that [facility] is telling him that they don't have his medication CELEBREX, even though we sent it over on 11/7 and have confirmation that they received it.     Please Advise
0 = swallows foods/liquids without difficulty

## 2023-04-04 NOTE — ED PROVIDER NOTE - HIV OFFER
Terbinafine Counseling: Patient counseling regarding adverse effects of terbinafine including but not limited to headache, diarrhea, rash, upset stomach, liver function test abnormalities, itching, taste/smell disturbance, nausea, abdominal pain, and flatulence.  There is a rare possibility of liver failure that can occur when taking terbinafine.  The patient understands that a baseline LFT and kidney function test may be required. The patient verbalized understanding of the proper use and possible adverse effects of terbinafine.  All of the patient's questions and concerns were addressed. Previously Declined (within the last year)

## 2023-05-01 NOTE — PROGRESS NOTE ADULT - SUBJECTIVE AND OBJECTIVE BOX
PGY-1 Progress Note discussed with attending    PAGER #: [[phone]] TILL 5:00 PM  PLEASE CONTACT ON CALL TEAM:  - On Call Team (Please refer to Joe) FROM 5:00 PM - 8:30PM  - Nightfloat Team FROM 8:30 -7:30 AM    CHIEF COMPLAINT & BRIEF HOSPITAL COURSE:    INTERVAL HPI/OVERNIGHT EVENTS:     MEDICATIONS  (STANDING):  furosemide    Tablet 20 milliGRAM(s) Oral daily  gabapentin 200 milliGRAM(s) Oral at bedtime  insulin lispro (ADMELOG) corrective regimen sliding scale   SubCutaneous three times a day before meals  magnesium citrate Oral Solution 1 Bottle Oral once  simvastatin 10 milliGRAM(s) Oral at bedtime    MEDICATIONS  (PRN):  acetaminophen     Tablet .. 650 milliGRAM(s) Oral every 6 hours PRN Mild Pain (1 - 3), Moderate Pain (4 - 6)  oxycodone    5 mG/acetaminophen 325 mG 1 Tablet(s) Oral every 6 hours PRN Severe Pain (7 - 10)      REVIEW OF SYSTEMS:  CONSTITUTIONAL: No fever, weight loss, or fatigue  RESPIRATORY: No cough, wheezing, chills or hemoptysis; No shortness of breath  CARDIOVASCULAR: No chest pain, palpitations, dizziness, or leg swelling  GASTROINTESTINAL: No abdominal pain. No nausea, vomiting, or hematemesis; No diarrhea or constipation. No melena or hematochezia.  GENITOURINARY: No dysuria or hematuria, urinary frequency  NEUROLOGICAL: No headaches, memory loss, loss of strength, numbness, or tremors  SKIN: No itching, burning, rashes, or lesions     Vital Signs Last 24 Hrs  T(C): 36.4 (07 May 2022 04:51), Max: 36.7 (06 May 2022 20:27)  T(F): 97.6 (07 May 2022 04:51), Max: 98 (06 May 2022 20:27)  HR: 65 (07 May 2022 04:51) (60 - 66)  BP: 126/61 (07 May 2022 04:51) (116/62 - 146/74)  BP(mean): 80 (06 May 2022 20:27) (80 - 89)  RR: 18 (07 May 2022 04:51) (17 - 18)  SpO2: 96% (07 May 2022 04:51) (96% - 99%)    PHYSICAL EXAMINATION:  GENERAL: NAD, well built  HEAD:  Atraumatic, Normocephalic  EYES:  conjunctiva and sclera clear  NECK: Supple, No JVD, Normal thyroid  CHEST/LUNG: Clear to auscultation. Clear to percussion bilaterally; No rales, rhonchi, wheezing, or rubs  HEART: Regular rate and rhythm; No murmurs, rubs, or gallops  ABDOMEN: Soft, Nontender, Nondistended; Bowel sounds present  NERVOUS SYSTEM:  Alert & Oriented X3,    EXTREMITIES:  2+ Peripheral Pulses, No clubbing, cyanosis, or edema  SKIN: warm dry                          9.6    6.37  )-----------( 468      ( 07 May 2022 07:10 )             29.5     05-07    131<L>  |  98  |  20<H>  ----------------------------<  114<H>  4.8   |  28  |  1.16    Ca    8.8      07 May 2022 07:10  Phos  4.1     05-07  Mg     1.8     05-07    TPro  7.0  /  Alb  3.3<L>  /  TBili  0.7  /  DBili  x   /  AST  15  /  ALT  13  /  AlkPhos  103  05-07    LIVER FUNCTIONS - ( 07 May 2022 07:10 )  Alb: 3.3 g/dL / Pro: 7.0 g/dL / ALK PHOS: 103 U/L / ALT: 13 U/L DA / AST: 15 U/L / GGT: x                   CAPILLARY BLOOD GLUCOSE      RADIOLOGY & ADDITIONAL TESTS:                   AMS

## 2023-07-24 NOTE — PROGRESS NOTE ADULT - PROBLEM SELECTOR PROBLEM 5
HTN (hypertension) [de-identified] : Constitutional\par o Appearance : well-nourished, well developed, alert, in no acute distress \par Head and Face\par o Head :\par ¦ Inspection : atraumatic, normocephalic\par o Face :\par ¦ Inspection : no visible rash or discoloration\par Respiratory\par o Respiratory Effort: breathing unlabored \par Neurologic\par o Mental Status Examination :\par ¦ Orientation : alert and oriented X 3\par Psychiatric\par o Mood and Affect: mood normal, affect appropriate\par Cardiovascular\par o Observation/Palpation : - no swelling\par Lymphatic\par o Additional Nodes : No palpable lymph nodes present\par \par Right Lower Extremity\par o Buttock : no tenderness, swelling or deformities \par o Right Hip :\par ¦ Inspection/Palpation : no tenderness, swelling or deformities\par ¦ Range of Motion : full and painless in all planes, no crepitance\par ¦ Stability : joint stability intact\par ¦ Strength : extension, flexion 5/5, adduction, abduction, internal rotation and external rotation\par \par o Right Knee :\par ¦ Inspection/Palpation : no medial / lateral compartment tenderness, no swelling, well-healed arthroscopic portals\par ¦ Range of Motion : 0-130°, pain with full flexion, no crepitance, slightly decreased patellofemoral glide \par ¦ Stability : no valgus or varus instability present on provocative testing\par ¦ Strength : flexion and extension 5/5\par ¦ Tests and Signs : negative Anterior Drawer, negative Lachman\par \par Left Lower Extremity\par o Buttock : no tenderness, swelling or deformities \par o Left Hip :\par ¦ Inspection/Palpation : no tenderness, no swelling or deformities\par ¦ Range of Motion : full and painless in all planes, no crepitance\par ¦ Stability : joint stability intact\par ¦ Strength : extension, flexion, adduction, abduction, internal rotation and external rotation, 5/5\par \par o Left Knee :\par ¦ Inspection/Palpation : no tenderness, inferior patellar tenderness, well-healed arthroscopic portals, no swelling\par ¦ Range of Motion : 0-130°, no crepitance, good patellofemoral glide \par ¦ Stability : no valgus or varus instability present on provocative testing\par ¦ Strength : flexion 5/5, extension 5/5\par ¦ Tests and Signs : negative Anterior Drawer, negative Lachman\par \par Gait and Station:\par Gait: no abductor lurch on the right, no significant extremity swelling or lymphedema, good proprioception and balance, mild hamstrings tightness R>L\par \par o Knee injection : Indication- left knee osteoarthritis, Anatomic location- left intra-articular joint space, Spray - area was sterilized with Betadine and alcohol and anesthetized with Ethyl Chloride , needle used-20G, Medications given- 6cc's Synvisc (1/3) The patient tolerated the procedure well. Lot# [id number] Exp#2025-03

## 2024-06-18 NOTE — PROGRESS NOTE ADULT - PROBLEM SELECTOR PLAN 4
Verbalizes adequate pain relief with scheduled tylenol as ordered. Denies any pain this am.    Problem: Pain  Goal: Verbalizes/displays adequate comfort level or baseline comfort level  Outcome: Progressing      RESOLVED, s/p Lasix 80 IV in ED and Lasix 40 IV BID, now on Lasix IV QD  p/w progressive SOB x1wk  ddx: PE vs new-onset CHF vs symptomatic anemia  Physical Exam: decreased breath sounds b/l, pitting edema b/l (R>L), positive rashard sign    CXR pulmonary congestion   telemonitoring  dimer 483, LE doppler neg   TTE: EF 58%, MR, AR GIIDD, increased RV systolic, LEFT pleural effusion   PE ruled out  due to improvement from diuresis, no need for AC   Cardio, Dr. Tovar, consulted

## 2024-08-13 ENCOUNTER — INPATIENT (INPATIENT)
Facility: HOSPITAL | Age: 88
LOS: 3 days | Discharge: NURSING, REHAB OR LONG-TERM CARE | DRG: 603 | End: 2024-08-17
Attending: INTERNAL MEDICINE | Admitting: INTERNAL MEDICINE
Payer: COMMERCIAL

## 2024-08-13 VITALS
DIASTOLIC BLOOD PRESSURE: 58 MMHG | TEMPERATURE: 98 F | RESPIRATION RATE: 16 BRPM | HEIGHT: 62 IN | OXYGEN SATURATION: 96 % | WEIGHT: 128.09 LBS | SYSTOLIC BLOOD PRESSURE: 129 MMHG | HEART RATE: 60 BPM

## 2024-08-13 DIAGNOSIS — R60.0 LOCALIZED EDEMA: ICD-10-CM

## 2024-08-13 DIAGNOSIS — I10 ESSENTIAL (PRIMARY) HYPERTENSION: ICD-10-CM

## 2024-08-13 DIAGNOSIS — E78.5 HYPERLIPIDEMIA, UNSPECIFIED: ICD-10-CM

## 2024-08-13 DIAGNOSIS — E11.9 TYPE 2 DIABETES MELLITUS WITHOUT COMPLICATIONS: ICD-10-CM

## 2024-08-13 DIAGNOSIS — L03.90 CELLULITIS, UNSPECIFIED: ICD-10-CM

## 2024-08-13 DIAGNOSIS — Z29.9 ENCOUNTER FOR PROPHYLACTIC MEASURES, UNSPECIFIED: ICD-10-CM

## 2024-08-13 DIAGNOSIS — I63.9 CEREBRAL INFARCTION, UNSPECIFIED: ICD-10-CM

## 2024-08-13 DIAGNOSIS — E87.1 HYPO-OSMOLALITY AND HYPONATREMIA: ICD-10-CM

## 2024-08-13 DIAGNOSIS — I50.9 HEART FAILURE, UNSPECIFIED: ICD-10-CM

## 2024-08-13 PROBLEM — K62.89 OTHER SPECIFIED DISEASES OF ANUS AND RECTUM: Chronic | Status: ACTIVE | Noted: 2022-05-05

## 2024-08-13 PROBLEM — D50.9 IRON DEFICIENCY ANEMIA, UNSPECIFIED: Chronic | Status: ACTIVE | Noted: 2022-05-05

## 2024-08-13 LAB
ALBUMIN SERPL ELPH-MCNC: 3.3 G/DL — LOW (ref 3.5–5)
ALP SERPL-CCNC: 295 U/L — HIGH (ref 40–120)
ALT FLD-CCNC: 11 U/L DA — SIGNIFICANT CHANGE UP (ref 10–60)
ANION GAP SERPL CALC-SCNC: 10 MMOL/L — SIGNIFICANT CHANGE UP (ref 5–17)
AST SERPL-CCNC: 18 U/L — SIGNIFICANT CHANGE UP (ref 10–40)
BASOPHILS # BLD AUTO: 0.04 K/UL — SIGNIFICANT CHANGE UP (ref 0–0.2)
BASOPHILS NFR BLD AUTO: 0.4 % — SIGNIFICANT CHANGE UP (ref 0–2)
BILIRUB SERPL-MCNC: 0.7 MG/DL — SIGNIFICANT CHANGE UP (ref 0.2–1.2)
BUN SERPL-MCNC: 15 MG/DL — SIGNIFICANT CHANGE UP (ref 7–18)
CALCIUM SERPL-MCNC: 8.7 MG/DL — SIGNIFICANT CHANGE UP (ref 8.4–10.5)
CHLORIDE SERPL-SCNC: 98 MMOL/L — SIGNIFICANT CHANGE UP (ref 96–108)
CK SERPL-CCNC: 81 U/L — SIGNIFICANT CHANGE UP (ref 21–215)
CO2 SERPL-SCNC: 20 MMOL/L — LOW (ref 22–31)
CREAT SERPL-MCNC: 0.75 MG/DL — SIGNIFICANT CHANGE UP (ref 0.5–1.3)
EGFR: 75 ML/MIN/1.73M2 — SIGNIFICANT CHANGE UP
EGFR: 75 ML/MIN/1.73M2 — SIGNIFICANT CHANGE UP
EOSINOPHIL # BLD AUTO: 0.14 K/UL — SIGNIFICANT CHANGE UP (ref 0–0.5)
EOSINOPHIL NFR BLD AUTO: 1.6 % — SIGNIFICANT CHANGE UP (ref 0–6)
ERYTHROCYTE [SEDIMENTATION RATE] IN BLOOD: 25 MM/HR — HIGH (ref 0–20)
GLUCOSE BLDC GLUCOMTR-MCNC: 116 MG/DL — HIGH (ref 70–99)
GLUCOSE SERPL-MCNC: 104 MG/DL — HIGH (ref 70–99)
HCT VFR BLD CALC: 38.5 % — SIGNIFICANT CHANGE UP (ref 34.5–45)
HGB BLD-MCNC: 12.7 G/DL — SIGNIFICANT CHANGE UP (ref 11.5–15.5)
IMM GRANULOCYTES NFR BLD AUTO: 0.3 % — SIGNIFICANT CHANGE UP (ref 0–0.9)
LACTATE SERPL-SCNC: 0.8 MMOL/L — SIGNIFICANT CHANGE UP (ref 0.7–2)
LYMPHOCYTES # BLD AUTO: 1.09 K/UL — SIGNIFICANT CHANGE UP (ref 1–3.3)
LYMPHOCYTES # BLD AUTO: 12.1 % — LOW (ref 13–44)
MAGNESIUM SERPL-MCNC: 2.1 MG/DL — SIGNIFICANT CHANGE UP (ref 1.6–2.6)
MCHC RBC-ENTMCNC: 29.1 PG — SIGNIFICANT CHANGE UP (ref 27–34)
MCHC RBC-ENTMCNC: 33 GM/DL — SIGNIFICANT CHANGE UP (ref 32–36)
MCV RBC AUTO: 88.1 FL — SIGNIFICANT CHANGE UP (ref 80–100)
MONOCYTES # BLD AUTO: 0.65 K/UL — SIGNIFICANT CHANGE UP (ref 0–0.9)
MONOCYTES NFR BLD AUTO: 7.2 % — SIGNIFICANT CHANGE UP (ref 2–14)
NEUTROPHILS # BLD AUTO: 7.05 K/UL — SIGNIFICANT CHANGE UP (ref 1.8–7.4)
NEUTROPHILS NFR BLD AUTO: 78.4 % — HIGH (ref 43–77)
NRBC # BLD: 0 /100 WBCS — SIGNIFICANT CHANGE UP (ref 0–0)
NRBC BLD-RTO: 0 /100 WBCS — SIGNIFICANT CHANGE UP (ref 0–0)
NT-PROBNP SERPL-SCNC: 1199 PG/ML — HIGH (ref 0–450)
PLATELET # BLD AUTO: 366 K/UL — SIGNIFICANT CHANGE UP (ref 150–400)
POTASSIUM SERPL-MCNC: 3.9 MMOL/L — SIGNIFICANT CHANGE UP (ref 3.5–5.3)
POTASSIUM SERPL-SCNC: 3.9 MMOL/L — SIGNIFICANT CHANGE UP (ref 3.5–5.3)
PROT SERPL-MCNC: 7.4 G/DL — SIGNIFICANT CHANGE UP (ref 6–8.3)
RBC # BLD: 4.37 M/UL — SIGNIFICANT CHANGE UP (ref 3.8–5.2)
RBC # FLD: 13.2 % — SIGNIFICANT CHANGE UP (ref 10.3–14.5)
SODIUM SERPL-SCNC: 128 MMOL/L — LOW (ref 135–145)
TROPONIN I, HIGH SENSITIVITY RESULT: 12.1 NG/L — SIGNIFICANT CHANGE UP
WBC # BLD: 9 K/UL — SIGNIFICANT CHANGE UP (ref 3.8–10.5)
WBC # FLD AUTO: 9 K/UL — SIGNIFICANT CHANGE UP (ref 3.8–10.5)

## 2024-08-13 PROCEDURE — 99285 EMERGENCY DEPT VISIT HI MDM: CPT

## 2024-08-13 PROCEDURE — 93971 EXTREMITY STUDY: CPT | Mod: 26,LT

## 2024-08-13 PROCEDURE — 71045 X-RAY EXAM CHEST 1 VIEW: CPT | Mod: 26

## 2024-08-13 RX ORDER — INSULIN LISPRO 100 U/ML
INJECTION, SOLUTION INTRAVENOUS; SUBCUTANEOUS
Refills: 0 | Status: DISCONTINUED | OUTPATIENT
Start: 2024-08-13 | End: 2024-08-17

## 2024-08-13 RX ORDER — FUROSEMIDE 10 MG/ML
20 INJECTION INTRAMUSCULAR; INTRAVENOUS DAILY
Refills: 0 | Status: DISCONTINUED | OUTPATIENT
Start: 2024-08-13 | End: 2024-08-13

## 2024-08-13 RX ORDER — ONDANSETRON HCL/PF 4 MG/2 ML
4 VIAL (ML) INJECTION EVERY 8 HOURS
Refills: 0 | Status: DISCONTINUED | OUTPATIENT
Start: 2024-08-13 | End: 2024-08-17

## 2024-08-13 RX ORDER — FUROSEMIDE 10 MG/ML
20 INJECTION INTRAMUSCULAR; INTRAVENOUS DAILY
Refills: 0 | Status: DISCONTINUED | OUTPATIENT
Start: 2024-08-13 | End: 2024-08-16

## 2024-08-13 RX ORDER — CLOPIDOGREL BISULFATE 75 MG/1
75 TABLET, FILM COATED ORAL DAILY
Refills: 0 | Status: DISCONTINUED | OUTPATIENT
Start: 2024-08-13 | End: 2024-08-17

## 2024-08-13 RX ORDER — MAGNESIUM, ALUMINUM HYDROXIDE 200-200 MG
30 TABLET,CHEWABLE ORAL EVERY 4 HOURS
Refills: 0 | Status: DISCONTINUED | OUTPATIENT
Start: 2024-08-13 | End: 2024-08-17

## 2024-08-13 RX ORDER — ENOXAPARIN SODIUM 100 MG/ML
40 INJECTION SUBCUTANEOUS EVERY 24 HOURS
Refills: 0 | Status: DISCONTINUED | OUTPATIENT
Start: 2024-08-13 | End: 2024-08-17

## 2024-08-13 RX ORDER — AMLODIPINE BESYLATE 10 MG/1
5 TABLET ORAL DAILY
Refills: 0 | Status: DISCONTINUED | OUTPATIENT
Start: 2024-08-13 | End: 2024-08-14

## 2024-08-13 RX ORDER — INSULIN LISPRO 100 U/ML
INJECTION, SOLUTION INTRAVENOUS; SUBCUTANEOUS AT BEDTIME
Refills: 0 | Status: DISCONTINUED | OUTPATIENT
Start: 2024-08-13 | End: 2024-08-17

## 2024-08-13 RX ORDER — MELATONIN 5 MG
3 TABLET ORAL AT BEDTIME
Refills: 0 | Status: DISCONTINUED | OUTPATIENT
Start: 2024-08-13 | End: 2024-08-17

## 2024-08-13 RX ORDER — ACETAMINOPHEN 500 MG/5ML
650 LIQUID (ML) ORAL EVERY 6 HOURS
Refills: 0 | Status: DISCONTINUED | OUTPATIENT
Start: 2024-08-13 | End: 2024-08-17

## 2024-08-13 RX ORDER — ACETAMINOPHEN 500 MG/5ML
650 LIQUID (ML) ORAL ONCE
Refills: 0 | Status: COMPLETED | OUTPATIENT
Start: 2024-08-13 | End: 2024-08-13

## 2024-08-13 RX ORDER — PIPERACILLIN-TAZO-DEXTROSE,ISO 3.375G/5
3.38 IV SOLUTION, PIGGYBACK PREMIX FROZEN(ML) INTRAVENOUS ONCE
Refills: 0 | Status: COMPLETED | OUTPATIENT
Start: 2024-08-13 | End: 2024-08-13

## 2024-08-13 RX ADMIN — Medication 650 MILLIGRAM(S): at 17:50

## 2024-08-13 RX ADMIN — Medication 650 MILLIGRAM(S): at 16:37

## 2024-08-13 RX ADMIN — Medication 200 GRAM(S): at 16:37

## 2024-08-13 RX ADMIN — FUROSEMIDE 20 MILLIGRAM(S): 10 INJECTION INTRAMUSCULAR; INTRAVENOUS at 22:37

## 2024-08-13 RX ADMIN — Medication 10 MILLIGRAM(S): at 22:37

## 2024-08-14 DIAGNOSIS — M79.605 PAIN IN LEFT LEG: ICD-10-CM

## 2024-08-14 LAB
A1C WITH ESTIMATED AVERAGE GLUCOSE RESULT: 6.2 % — HIGH (ref 4–5.6)
ANION GAP SERPL CALC-SCNC: 9 MMOL/L — SIGNIFICANT CHANGE UP (ref 5–17)
BUN SERPL-MCNC: 13 MG/DL — SIGNIFICANT CHANGE UP (ref 7–18)
CALCIUM SERPL-MCNC: 8.6 MG/DL — SIGNIFICANT CHANGE UP (ref 8.4–10.5)
CHLORIDE SERPL-SCNC: 98 MMOL/L — SIGNIFICANT CHANGE UP (ref 96–108)
CO2 SERPL-SCNC: 25 MMOL/L — SIGNIFICANT CHANGE UP (ref 22–31)
CREAT SERPL-MCNC: 0.71 MG/DL — SIGNIFICANT CHANGE UP (ref 0.5–1.3)
CRP SERPL-MCNC: 4 MG/L — SIGNIFICANT CHANGE UP
CRP SERPL-MCNC: 4 MG/L — SIGNIFICANT CHANGE UP
EGFR: 80 ML/MIN/1.73M2 — SIGNIFICANT CHANGE UP
EGFR: 80 ML/MIN/1.73M2 — SIGNIFICANT CHANGE UP
ESTIMATED AVERAGE GLUCOSE: 131 MG/DL — HIGH (ref 68–114)
GLUCOSE BLDC GLUCOMTR-MCNC: 85 MG/DL — SIGNIFICANT CHANGE UP (ref 70–99)
GLUCOSE BLDC GLUCOMTR-MCNC: 85 MG/DL — SIGNIFICANT CHANGE UP (ref 70–99)
GLUCOSE BLDC GLUCOMTR-MCNC: 87 MG/DL — SIGNIFICANT CHANGE UP (ref 70–99)
GLUCOSE BLDC GLUCOMTR-MCNC: 99 MG/DL — SIGNIFICANT CHANGE UP (ref 70–99)
GLUCOSE SERPL-MCNC: 98 MG/DL — SIGNIFICANT CHANGE UP (ref 70–99)
HCT VFR BLD CALC: 38.5 % — SIGNIFICANT CHANGE UP (ref 34.5–45)
HGB BLD-MCNC: 12.8 G/DL — SIGNIFICANT CHANGE UP (ref 11.5–15.5)
MAGNESIUM SERPL-MCNC: 2 MG/DL — SIGNIFICANT CHANGE UP (ref 1.6–2.6)
MCHC RBC-ENTMCNC: 28.9 PG — SIGNIFICANT CHANGE UP (ref 27–34)
MCHC RBC-ENTMCNC: 33.2 GM/DL — SIGNIFICANT CHANGE UP (ref 32–36)
MCV RBC AUTO: 86.9 FL — SIGNIFICANT CHANGE UP (ref 80–100)
NRBC # BLD: 0 /100 WBCS — SIGNIFICANT CHANGE UP (ref 0–0)
NRBC BLD-RTO: 0 /100 WBCS — SIGNIFICANT CHANGE UP (ref 0–0)
PHOSPHATE SERPL-MCNC: 3.4 MG/DL — SIGNIFICANT CHANGE UP (ref 2.5–4.5)
PLATELET # BLD AUTO: 354 K/UL — SIGNIFICANT CHANGE UP (ref 150–400)
POTASSIUM SERPL-MCNC: 3.4 MMOL/L — LOW (ref 3.5–5.3)
POTASSIUM SERPL-SCNC: 3.4 MMOL/L — LOW (ref 3.5–5.3)
PROCALCITONIN SERPL-MCNC: 0.06 NG/ML — SIGNIFICANT CHANGE UP (ref 0.02–0.1)
RBC # BLD: 4.43 M/UL — SIGNIFICANT CHANGE UP (ref 3.8–5.2)
RBC # FLD: 13.2 % — SIGNIFICANT CHANGE UP (ref 10.3–14.5)
SODIUM SERPL-SCNC: 132 MMOL/L — LOW (ref 135–145)
WBC # BLD: 7.21 K/UL — SIGNIFICANT CHANGE UP (ref 3.8–10.5)
WBC # FLD AUTO: 7.21 K/UL — SIGNIFICANT CHANGE UP (ref 3.8–10.5)

## 2024-08-14 PROCEDURE — 99222 1ST HOSP IP/OBS MODERATE 55: CPT

## 2024-08-14 RX ORDER — LOSARTAN POTASSIUM 100 MG/1
25 TABLET, FILM COATED ORAL DAILY
Refills: 0 | Status: DISCONTINUED | OUTPATIENT
Start: 2024-08-14 | End: 2024-08-17

## 2024-08-14 RX ORDER — ACETAMINOPHEN 500 MG/5ML
1000 LIQUID (ML) ORAL EVERY 8 HOURS
Refills: 0 | Status: COMPLETED | OUTPATIENT
Start: 2024-08-14 | End: 2024-08-17

## 2024-08-14 RX ORDER — LIDOCAINE HYDROCHLORIDE 20 MG/ML
2 JELLY TOPICAL DAILY
Refills: 0 | Status: DISCONTINUED | OUTPATIENT
Start: 2024-08-14 | End: 2024-08-17

## 2024-08-14 RX ADMIN — LOSARTAN POTASSIUM 25 MILLIGRAM(S): 100 TABLET, FILM COATED ORAL at 12:23

## 2024-08-14 RX ADMIN — Medication 10 MILLIGRAM(S): at 21:04

## 2024-08-14 RX ADMIN — FUROSEMIDE 20 MILLIGRAM(S): 10 INJECTION INTRAMUSCULAR; INTRAVENOUS at 05:41

## 2024-08-14 RX ADMIN — LIDOCAINE HYDROCHLORIDE 2 PATCH: 20 JELLY TOPICAL at 19:15

## 2024-08-14 RX ADMIN — Medication 3 MILLIGRAM(S): at 21:03

## 2024-08-14 RX ADMIN — CLOPIDOGREL BISULFATE 75 MILLIGRAM(S): 75 TABLET, FILM COATED ORAL at 12:23

## 2024-08-14 RX ADMIN — LIDOCAINE HYDROCHLORIDE 2 PATCH: 20 JELLY TOPICAL at 12:23

## 2024-08-14 RX ADMIN — Medication 650 MILLIGRAM(S): at 05:40

## 2024-08-14 RX ADMIN — Medication 1000 MILLIGRAM(S): at 13:44

## 2024-08-14 RX ADMIN — Medication 1000 MILLIGRAM(S): at 21:03

## 2024-08-14 RX ADMIN — Medication 1000 MILLIGRAM(S): at 14:45

## 2024-08-14 RX ADMIN — AMLODIPINE BESYLATE 5 MILLIGRAM(S): 10 TABLET ORAL at 05:40

## 2024-08-14 RX ADMIN — Medication 40 MILLIEQUIVALENT(S): at 12:22

## 2024-08-14 RX ADMIN — ENOXAPARIN SODIUM 40 MILLIGRAM(S): 100 INJECTION SUBCUTANEOUS at 05:40

## 2024-08-14 RX ADMIN — Medication 650 MILLIGRAM(S): at 06:08

## 2024-08-14 RX ADMIN — Medication 1000 MILLIGRAM(S): at 22:00

## 2024-08-15 DIAGNOSIS — D72.829 ELEVATED WHITE BLOOD CELL COUNT, UNSPECIFIED: ICD-10-CM

## 2024-08-15 DIAGNOSIS — M19.90 UNSPECIFIED OSTEOARTHRITIS, UNSPECIFIED SITE: ICD-10-CM

## 2024-08-15 LAB
ALBUMIN SERPL ELPH-MCNC: 3.2 G/DL — LOW (ref 3.5–5)
ALP SERPL-CCNC: 280 U/L — HIGH (ref 40–120)
ALT FLD-CCNC: 15 U/L DA — SIGNIFICANT CHANGE UP (ref 10–60)
ANION GAP SERPL CALC-SCNC: 7 MMOL/L — SIGNIFICANT CHANGE UP (ref 5–17)
ANION GAP SERPL CALC-SCNC: 8 MMOL/L — SIGNIFICANT CHANGE UP (ref 5–17)
APPEARANCE UR: CLEAR — SIGNIFICANT CHANGE UP
AST SERPL-CCNC: 15 U/L — SIGNIFICANT CHANGE UP (ref 10–40)
BACTERIA # UR AUTO: ABNORMAL /HPF
BILIRUB SERPL-MCNC: 0.8 MG/DL — SIGNIFICANT CHANGE UP (ref 0.2–1.2)
BILIRUB UR-MCNC: NEGATIVE — SIGNIFICANT CHANGE UP
BUN SERPL-MCNC: 17 MG/DL — SIGNIFICANT CHANGE UP (ref 7–18)
BUN SERPL-MCNC: 20 MG/DL — HIGH (ref 7–18)
CALCIUM SERPL-MCNC: 8.5 MG/DL — SIGNIFICANT CHANGE UP (ref 8.4–10.5)
CALCIUM SERPL-MCNC: 8.7 MG/DL — SIGNIFICANT CHANGE UP (ref 8.4–10.5)
CHLORIDE SERPL-SCNC: 95 MMOL/L — LOW (ref 96–108)
CHLORIDE SERPL-SCNC: 97 MMOL/L — SIGNIFICANT CHANGE UP (ref 96–108)
CO2 SERPL-SCNC: 22 MMOL/L — SIGNIFICANT CHANGE UP (ref 22–31)
CO2 SERPL-SCNC: 24 MMOL/L — SIGNIFICANT CHANGE UP (ref 22–31)
COLOR SPEC: YELLOW — SIGNIFICANT CHANGE UP
CREAT SERPL-MCNC: 0.76 MG/DL — SIGNIFICANT CHANGE UP (ref 0.5–1.3)
CREAT SERPL-MCNC: 0.8 MG/DL — SIGNIFICANT CHANGE UP (ref 0.5–1.3)
CRP SERPL-MCNC: 16 MG/L — HIGH
DIFF PNL FLD: ABNORMAL
EGFR: 70 ML/MIN/1.73M2 — SIGNIFICANT CHANGE UP
EGFR: 70 ML/MIN/1.73M2 — SIGNIFICANT CHANGE UP
EGFR: 74 ML/MIN/1.73M2 — SIGNIFICANT CHANGE UP
EGFR: 74 ML/MIN/1.73M2 — SIGNIFICANT CHANGE UP
EPI CELLS # UR: PRESENT
ERYTHROCYTE [SEDIMENTATION RATE] IN BLOOD: 32 MM/HR — HIGH (ref 0–20)
FOLATE SERPL-MCNC: 4.8 NG/ML — SIGNIFICANT CHANGE UP
GLUCOSE BLDC GLUCOMTR-MCNC: 112 MG/DL — HIGH (ref 70–99)
GLUCOSE BLDC GLUCOMTR-MCNC: 118 MG/DL — HIGH (ref 70–99)
GLUCOSE BLDC GLUCOMTR-MCNC: 123 MG/DL — HIGH (ref 70–99)
GLUCOSE BLDC GLUCOMTR-MCNC: 266 MG/DL — HIGH (ref 70–99)
GLUCOSE SERPL-MCNC: 113 MG/DL — HIGH (ref 70–99)
GLUCOSE SERPL-MCNC: 135 MG/DL — HIGH (ref 70–99)
GLUCOSE UR QL: 500 MG/DL
HCT VFR BLD CALC: 39.8 % — SIGNIFICANT CHANGE UP (ref 34.5–45)
HGB BLD-MCNC: 13.1 G/DL — SIGNIFICANT CHANGE UP (ref 11.5–15.5)
KETONES UR-MCNC: NEGATIVE MG/DL — SIGNIFICANT CHANGE UP
LACTATE SERPL-SCNC: 1.1 MMOL/L — SIGNIFICANT CHANGE UP (ref 0.7–2)
LEUKOCYTE ESTERASE UR-ACNC: ABNORMAL
MCHC RBC-ENTMCNC: 28.7 PG — SIGNIFICANT CHANGE UP (ref 27–34)
MCHC RBC-ENTMCNC: 32.9 GM/DL — SIGNIFICANT CHANGE UP (ref 32–36)
MCV RBC AUTO: 87.3 FL — SIGNIFICANT CHANGE UP (ref 80–100)
NITRITE UR-MCNC: NEGATIVE — SIGNIFICANT CHANGE UP
NRBC # BLD: 0 /100 WBCS — SIGNIFICANT CHANGE UP (ref 0–0)
NRBC BLD-RTO: 0 /100 WBCS — SIGNIFICANT CHANGE UP (ref 0–0)
OSMOLALITY UR: 368 MOS/KG — SIGNIFICANT CHANGE UP (ref 50–1200)
PH UR: 6.5 — SIGNIFICANT CHANGE UP (ref 5–8)
PLATELET # BLD AUTO: 360 K/UL — SIGNIFICANT CHANGE UP (ref 150–400)
POTASSIUM SERPL-MCNC: 3.8 MMOL/L — SIGNIFICANT CHANGE UP (ref 3.5–5.3)
POTASSIUM SERPL-MCNC: 5.5 MMOL/L — HIGH (ref 3.5–5.3)
POTASSIUM SERPL-SCNC: 3.8 MMOL/L — SIGNIFICANT CHANGE UP (ref 3.5–5.3)
POTASSIUM SERPL-SCNC: 5.5 MMOL/L — HIGH (ref 3.5–5.3)
POTASSIUM UR-SCNC: 29 MMOL/L — SIGNIFICANT CHANGE UP
PROT ?TM UR-MCNC: 19 MG/DL — HIGH (ref 0–12)
PROT SERPL-MCNC: 7.2 G/DL — SIGNIFICANT CHANGE UP (ref 6–8.3)
PROT UR-MCNC: NEGATIVE MG/DL — SIGNIFICANT CHANGE UP
RAPID RVP RESULT: SIGNIFICANT CHANGE UP
RBC # BLD: 4.56 M/UL — SIGNIFICANT CHANGE UP (ref 3.8–5.2)
RBC # FLD: 13.2 % — SIGNIFICANT CHANGE UP (ref 10.3–14.5)
RBC CASTS # UR COMP ASSIST: 2 /HPF — SIGNIFICANT CHANGE UP (ref 0–4)
RHEUMATOID FACT SERPL-ACNC: 11 IU/ML — SIGNIFICANT CHANGE UP (ref 0–13)
SARS-COV-2 RNA SPEC QL NAA+PROBE: SIGNIFICANT CHANGE UP
SODIUM SERPL-SCNC: 124 MMOL/L — LOW (ref 135–145)
SODIUM SERPL-SCNC: 129 MMOL/L — LOW (ref 135–145)
SODIUM UR-SCNC: 90 MMOL/L — SIGNIFICANT CHANGE UP
SP GR SPEC: 1.01 — SIGNIFICANT CHANGE UP (ref 1–1.03)
TSH SERPL-MCNC: 1.78 UU/ML — SIGNIFICANT CHANGE UP (ref 0.34–4.82)
URATE SERPL-MCNC: 4.3 MG/DL — SIGNIFICANT CHANGE UP (ref 2.5–7)
UROBILINOGEN FLD QL: 1 MG/DL — SIGNIFICANT CHANGE UP (ref 0.2–1)
VIT B12 SERPL-MCNC: 208 PG/ML — LOW (ref 232–1245)
WBC # BLD: 17.66 K/UL — HIGH (ref 3.8–10.5)
WBC # FLD AUTO: 17.66 K/UL — HIGH (ref 3.8–10.5)
WBC UR QL: 3 /HPF — SIGNIFICANT CHANGE UP (ref 0–5)

## 2024-08-15 PROCEDURE — 71045 X-RAY EXAM CHEST 1 VIEW: CPT | Mod: 26

## 2024-08-15 PROCEDURE — 93306 TTE W/DOPPLER COMPLETE: CPT | Mod: 26

## 2024-08-15 PROCEDURE — 99232 SBSQ HOSP IP/OBS MODERATE 35: CPT

## 2024-08-15 RX ORDER — ONDANSETRON HCL/PF 4 MG/2 ML
4 VIAL (ML) INJECTION EVERY 8 HOURS
Refills: 0 | Status: DISCONTINUED | OUTPATIENT
Start: 2024-08-15 | End: 2024-08-15

## 2024-08-15 RX ORDER — SENNA 187 MG
2 TABLET ORAL AT BEDTIME
Refills: 0 | Status: DISCONTINUED | OUTPATIENT
Start: 2024-08-15 | End: 2024-08-17

## 2024-08-15 RX ORDER — PREDNISONE 20 MG/1
40 TABLET ORAL DAILY
Refills: 0 | Status: DISCONTINUED | OUTPATIENT
Start: 2024-08-15 | End: 2024-08-15

## 2024-08-15 RX ORDER — POLYETHYLENE GLYCOL 3350 17 G/17G
17 POWDER, FOR SOLUTION ORAL DAILY
Refills: 0 | Status: DISCONTINUED | OUTPATIENT
Start: 2024-08-15 | End: 2024-08-17

## 2024-08-15 RX ADMIN — FUROSEMIDE 20 MILLIGRAM(S): 10 INJECTION INTRAMUSCULAR; INTRAVENOUS at 05:43

## 2024-08-15 RX ADMIN — Medication 30 MILLILITER(S): at 13:39

## 2024-08-15 RX ADMIN — LIDOCAINE HYDROCHLORIDE 2 PATCH: 20 JELLY TOPICAL at 19:28

## 2024-08-15 RX ADMIN — Medication 1000 MILLIGRAM(S): at 05:43

## 2024-08-15 RX ADMIN — Medication 10 MILLIGRAM(S): at 21:01

## 2024-08-15 RX ADMIN — Medication 1000 MILLIGRAM(S): at 21:00

## 2024-08-15 RX ADMIN — ENOXAPARIN SODIUM 40 MILLIGRAM(S): 100 INJECTION SUBCUTANEOUS at 05:44

## 2024-08-15 RX ADMIN — Medication 60 MILLILITER(S): at 10:46

## 2024-08-15 RX ADMIN — Medication 1 GRAM(S): at 21:00

## 2024-08-15 RX ADMIN — Medication 1000 MILLIGRAM(S): at 06:30

## 2024-08-15 RX ADMIN — LIDOCAINE HYDROCHLORIDE 2 PATCH: 20 JELLY TOPICAL at 00:49

## 2024-08-15 RX ADMIN — INSULIN LISPRO 1: 100 INJECTION, SOLUTION INTRAVENOUS; SUBCUTANEOUS at 21:51

## 2024-08-15 RX ADMIN — Medication 3 MILLIGRAM(S): at 21:00

## 2024-08-15 RX ADMIN — LOSARTAN POTASSIUM 25 MILLIGRAM(S): 100 TABLET, FILM COATED ORAL at 05:44

## 2024-08-15 RX ADMIN — POLYETHYLENE GLYCOL 3350 17 GRAM(S): 17 POWDER, FOR SOLUTION ORAL at 11:52

## 2024-08-15 RX ADMIN — Medication 650 MILLIGRAM(S): at 01:16

## 2024-08-15 RX ADMIN — Medication 4 MILLIGRAM(S): at 09:21

## 2024-08-15 RX ADMIN — Medication 650 MILLIGRAM(S): at 02:15

## 2024-08-15 RX ADMIN — Medication 1000 MILLIGRAM(S): at 22:00

## 2024-08-15 RX ADMIN — LIDOCAINE HYDROCHLORIDE 2 PATCH: 20 JELLY TOPICAL at 23:33

## 2024-08-16 DIAGNOSIS — I50.33 ACUTE ON CHRONIC DIASTOLIC (CONGESTIVE) HEART FAILURE: ICD-10-CM

## 2024-08-16 DIAGNOSIS — Z75.8 OTHER PROBLEMS RELATED TO MEDICAL FACILITIES AND OTHER HEALTH CARE: ICD-10-CM

## 2024-08-16 LAB
ALBUMIN SERPL ELPH-MCNC: 3.2 G/DL — LOW (ref 3.5–5)
ALP SERPL-CCNC: 282 U/L — HIGH (ref 40–120)
ALT FLD-CCNC: 14 U/L DA — SIGNIFICANT CHANGE UP (ref 10–60)
ANION GAP SERPL CALC-SCNC: 10 MMOL/L — SIGNIFICANT CHANGE UP (ref 5–17)
ANION GAP SERPL CALC-SCNC: 6 MMOL/L — SIGNIFICANT CHANGE UP (ref 5–17)
AST SERPL-CCNC: 13 U/L — SIGNIFICANT CHANGE UP (ref 10–40)
BILIRUB SERPL-MCNC: 0.9 MG/DL — SIGNIFICANT CHANGE UP (ref 0.2–1.2)
BUN SERPL-MCNC: 16 MG/DL — SIGNIFICANT CHANGE UP (ref 7–18)
BUN SERPL-MCNC: 16 MG/DL — SIGNIFICANT CHANGE UP (ref 7–18)
CALCIUM SERPL-MCNC: 8.5 MG/DL — SIGNIFICANT CHANGE UP (ref 8.4–10.5)
CALCIUM SERPL-MCNC: 8.9 MG/DL — SIGNIFICANT CHANGE UP (ref 8.4–10.5)
CHLORIDE SERPL-SCNC: 95 MMOL/L — LOW (ref 96–108)
CHLORIDE SERPL-SCNC: 97 MMOL/L — SIGNIFICANT CHANGE UP (ref 96–108)
CO2 SERPL-SCNC: 26 MMOL/L — SIGNIFICANT CHANGE UP (ref 22–31)
CO2 SERPL-SCNC: 26 MMOL/L — SIGNIFICANT CHANGE UP (ref 22–31)
CREAT SERPL-MCNC: 0.71 MG/DL — SIGNIFICANT CHANGE UP (ref 0.5–1.3)
CREAT SERPL-MCNC: 0.84 MG/DL — SIGNIFICANT CHANGE UP (ref 0.5–1.3)
EGFR: 66 ML/MIN/1.73M2 — SIGNIFICANT CHANGE UP
EGFR: 66 ML/MIN/1.73M2 — SIGNIFICANT CHANGE UP
EGFR: 80 ML/MIN/1.73M2 — SIGNIFICANT CHANGE UP
EGFR: 80 ML/MIN/1.73M2 — SIGNIFICANT CHANGE UP
GLUCOSE BLDC GLUCOMTR-MCNC: 106 MG/DL — HIGH (ref 70–99)
GLUCOSE BLDC GLUCOMTR-MCNC: 110 MG/DL — HIGH (ref 70–99)
GLUCOSE BLDC GLUCOMTR-MCNC: 87 MG/DL — SIGNIFICANT CHANGE UP (ref 70–99)
GLUCOSE BLDC GLUCOMTR-MCNC: 88 MG/DL — SIGNIFICANT CHANGE UP (ref 70–99)
GLUCOSE SERPL-MCNC: 107 MG/DL — HIGH (ref 70–99)
GLUCOSE SERPL-MCNC: 89 MG/DL — SIGNIFICANT CHANGE UP (ref 70–99)
HCT VFR BLD CALC: 40.6 % — SIGNIFICANT CHANGE UP (ref 34.5–45)
HGB BLD-MCNC: 13.5 G/DL — SIGNIFICANT CHANGE UP (ref 11.5–15.5)
MCHC RBC-ENTMCNC: 29.3 PG — SIGNIFICANT CHANGE UP (ref 27–34)
MCHC RBC-ENTMCNC: 33.3 GM/DL — SIGNIFICANT CHANGE UP (ref 32–36)
MCV RBC AUTO: 88.3 FL — SIGNIFICANT CHANGE UP (ref 80–100)
NRBC # BLD: 0 /100 WBCS — SIGNIFICANT CHANGE UP (ref 0–0)
NRBC BLD-RTO: 0 /100 WBCS — SIGNIFICANT CHANGE UP (ref 0–0)
PHOSPHATE 24H UR-MCNC: 13.8 MG/DL — SIGNIFICANT CHANGE UP
PLATELET # BLD AUTO: 364 K/UL — SIGNIFICANT CHANGE UP (ref 150–400)
POTASSIUM SERPL-MCNC: 3.7 MMOL/L — SIGNIFICANT CHANGE UP (ref 3.5–5.3)
POTASSIUM SERPL-MCNC: 3.8 MMOL/L — SIGNIFICANT CHANGE UP (ref 3.5–5.3)
POTASSIUM SERPL-SCNC: 3.7 MMOL/L — SIGNIFICANT CHANGE UP (ref 3.5–5.3)
POTASSIUM SERPL-SCNC: 3.8 MMOL/L — SIGNIFICANT CHANGE UP (ref 3.5–5.3)
PROT SERPL-MCNC: 7.4 G/DL — SIGNIFICANT CHANGE UP (ref 6–8.3)
RBC # BLD: 4.6 M/UL — SIGNIFICANT CHANGE UP (ref 3.8–5.2)
RBC # FLD: 13.3 % — SIGNIFICANT CHANGE UP (ref 10.3–14.5)
SODIUM SERPL-SCNC: 129 MMOL/L — LOW (ref 135–145)
SODIUM SERPL-SCNC: 131 MMOL/L — LOW (ref 135–145)
WBC # BLD: 10.07 K/UL — SIGNIFICANT CHANGE UP (ref 3.8–10.5)
WBC # FLD AUTO: 10.07 K/UL — SIGNIFICANT CHANGE UP (ref 3.8–10.5)

## 2024-08-16 RX ADMIN — Medication 1000 MILLIGRAM(S): at 15:15

## 2024-08-16 RX ADMIN — LOSARTAN POTASSIUM 25 MILLIGRAM(S): 100 TABLET, FILM COATED ORAL at 05:07

## 2024-08-16 RX ADMIN — FUROSEMIDE 20 MILLIGRAM(S): 10 INJECTION INTRAMUSCULAR; INTRAVENOUS at 05:06

## 2024-08-16 RX ADMIN — Medication 10 MILLIGRAM(S): at 21:02

## 2024-08-16 RX ADMIN — LIDOCAINE HYDROCHLORIDE 2 PATCH: 20 JELLY TOPICAL at 14:45

## 2024-08-16 RX ADMIN — Medication 1000 MILLIGRAM(S): at 21:02

## 2024-08-16 RX ADMIN — Medication 1000 MILLIGRAM(S): at 22:00

## 2024-08-16 RX ADMIN — Medication 1000 MILLIGRAM(S): at 06:00

## 2024-08-16 RX ADMIN — Medication 3 MILLIGRAM(S): at 21:02

## 2024-08-16 RX ADMIN — LIDOCAINE HYDROCHLORIDE 2 PATCH: 20 JELLY TOPICAL at 19:38

## 2024-08-16 RX ADMIN — Medication 1000 MILLIGRAM(S): at 14:45

## 2024-08-16 RX ADMIN — CLOPIDOGREL BISULFATE 75 MILLIGRAM(S): 75 TABLET, FILM COATED ORAL at 14:45

## 2024-08-16 RX ADMIN — Medication 1 GRAM(S): at 05:07

## 2024-08-16 RX ADMIN — Medication 1000 MILLIGRAM(S): at 05:07

## 2024-08-16 RX ADMIN — ENOXAPARIN SODIUM 40 MILLIGRAM(S): 100 INJECTION SUBCUTANEOUS at 05:05

## 2024-08-17 VITALS
SYSTOLIC BLOOD PRESSURE: 144 MMHG | TEMPERATURE: 97 F | RESPIRATION RATE: 18 BRPM | OXYGEN SATURATION: 98 % | HEART RATE: 76 BPM | DIASTOLIC BLOOD PRESSURE: 64 MMHG

## 2024-08-17 LAB
ANION GAP SERPL CALC-SCNC: 9 MMOL/L — SIGNIFICANT CHANGE UP (ref 5–17)
BUN SERPL-MCNC: 18 MG/DL — SIGNIFICANT CHANGE UP (ref 7–18)
CALCIUM SERPL-MCNC: 8.8 MG/DL — SIGNIFICANT CHANGE UP (ref 8.4–10.5)
CHLORIDE SERPL-SCNC: 96 MMOL/L — SIGNIFICANT CHANGE UP (ref 96–108)
CO2 SERPL-SCNC: 25 MMOL/L — SIGNIFICANT CHANGE UP (ref 22–31)
CREAT SERPL-MCNC: 0.72 MG/DL — SIGNIFICANT CHANGE UP (ref 0.5–1.3)
EGFR: 79 ML/MIN/1.73M2 — SIGNIFICANT CHANGE UP
EGFR: 79 ML/MIN/1.73M2 — SIGNIFICANT CHANGE UP
GLUCOSE BLDC GLUCOMTR-MCNC: 102 MG/DL — HIGH (ref 70–99)
GLUCOSE BLDC GLUCOMTR-MCNC: 142 MG/DL — HIGH (ref 70–99)
GLUCOSE BLDC GLUCOMTR-MCNC: 86 MG/DL — SIGNIFICANT CHANGE UP (ref 70–99)
GLUCOSE SERPL-MCNC: 81 MG/DL — SIGNIFICANT CHANGE UP (ref 70–99)
OSMOLALITY UR: 500 MOS/KG — SIGNIFICANT CHANGE UP (ref 50–1200)
POTASSIUM SERPL-MCNC: 3.7 MMOL/L — SIGNIFICANT CHANGE UP (ref 3.5–5.3)
POTASSIUM SERPL-SCNC: 3.7 MMOL/L — SIGNIFICANT CHANGE UP (ref 3.5–5.3)
SODIUM SERPL-SCNC: 130 MMOL/L — LOW (ref 135–145)
SODIUM UR-SCNC: 34 MMOL/L — SIGNIFICANT CHANGE UP

## 2024-08-17 PROCEDURE — 82550 ASSAY OF CK (CPK): CPT

## 2024-08-17 PROCEDURE — 84156 ASSAY OF PROTEIN URINE: CPT

## 2024-08-17 PROCEDURE — 83036 HEMOGLOBIN GLYCOSYLATED A1C: CPT

## 2024-08-17 PROCEDURE — 84105 ASSAY OF URINE PHOSPHORUS: CPT

## 2024-08-17 PROCEDURE — 84443 ASSAY THYROID STIM HORMONE: CPT

## 2024-08-17 PROCEDURE — 71045 X-RAY EXAM CHEST 1 VIEW: CPT

## 2024-08-17 PROCEDURE — 83880 ASSAY OF NATRIURETIC PEPTIDE: CPT

## 2024-08-17 PROCEDURE — 84484 ASSAY OF TROPONIN QUANT: CPT

## 2024-08-17 PROCEDURE — 80053 COMPREHEN METABOLIC PANEL: CPT

## 2024-08-17 PROCEDURE — 97530 THERAPEUTIC ACTIVITIES: CPT

## 2024-08-17 PROCEDURE — 93971 EXTREMITY STUDY: CPT

## 2024-08-17 PROCEDURE — 84100 ASSAY OF PHOSPHORUS: CPT

## 2024-08-17 PROCEDURE — 84550 ASSAY OF BLOOD/URIC ACID: CPT

## 2024-08-17 PROCEDURE — 83935 ASSAY OF URINE OSMOLALITY: CPT

## 2024-08-17 PROCEDURE — 86431 RHEUMATOID FACTOR QUANT: CPT

## 2024-08-17 PROCEDURE — 97116 GAIT TRAINING THERAPY: CPT

## 2024-08-17 PROCEDURE — 83735 ASSAY OF MAGNESIUM: CPT

## 2024-08-17 PROCEDURE — 82962 GLUCOSE BLOOD TEST: CPT

## 2024-08-17 PROCEDURE — 82607 VITAMIN B-12: CPT

## 2024-08-17 PROCEDURE — 84145 PROCALCITONIN (PCT): CPT

## 2024-08-17 PROCEDURE — 84300 ASSAY OF URINE SODIUM: CPT

## 2024-08-17 PROCEDURE — 84133 ASSAY OF URINE POTASSIUM: CPT

## 2024-08-17 PROCEDURE — 36415 COLL VENOUS BLD VENIPUNCTURE: CPT

## 2024-08-17 PROCEDURE — 99285 EMERGENCY DEPT VISIT HI MDM: CPT | Mod: 25

## 2024-08-17 PROCEDURE — 87040 BLOOD CULTURE FOR BACTERIA: CPT

## 2024-08-17 PROCEDURE — 97162 PT EVAL MOD COMPLEX 30 MIN: CPT

## 2024-08-17 PROCEDURE — 86140 C-REACTIVE PROTEIN: CPT

## 2024-08-17 PROCEDURE — 0225U NFCT DS DNA&RNA 21 SARSCOV2: CPT

## 2024-08-17 PROCEDURE — 93306 TTE W/DOPPLER COMPLETE: CPT

## 2024-08-17 PROCEDURE — 85027 COMPLETE CBC AUTOMATED: CPT

## 2024-08-17 PROCEDURE — 85025 COMPLETE CBC W/AUTO DIFF WBC: CPT

## 2024-08-17 PROCEDURE — 80048 BASIC METABOLIC PNL TOTAL CA: CPT

## 2024-08-17 PROCEDURE — 93005 ELECTROCARDIOGRAM TRACING: CPT

## 2024-08-17 PROCEDURE — 81001 URINALYSIS AUTO W/SCOPE: CPT

## 2024-08-17 PROCEDURE — 85652 RBC SED RATE AUTOMATED: CPT

## 2024-08-17 PROCEDURE — 83605 ASSAY OF LACTIC ACID: CPT

## 2024-08-17 PROCEDURE — 82746 ASSAY OF FOLIC ACID SERUM: CPT

## 2024-08-17 PROCEDURE — 96374 THER/PROPH/DIAG INJ IV PUSH: CPT

## 2024-08-17 RX ORDER — SENNA 187 MG
2 TABLET ORAL
Qty: 0 | Refills: 0 | DISCHARGE
Start: 2024-08-17

## 2024-08-17 RX ORDER — ACETAMINOPHEN 500 MG/5ML
2 LIQUID (ML) ORAL
Qty: 0 | Refills: 0 | DISCHARGE
Start: 2024-08-17

## 2024-08-17 RX ORDER — LOSARTAN POTASSIUM 100 MG/1
1 TABLET, FILM COATED ORAL
Qty: 0 | Refills: 0 | DISCHARGE
Start: 2024-08-17

## 2024-08-17 RX ORDER — MAGNESIUM, ALUMINUM HYDROXIDE 200-200 MG
30 TABLET,CHEWABLE ORAL
Qty: 0 | Refills: 0 | DISCHARGE
Start: 2024-08-17

## 2024-08-17 RX ORDER — POLYETHYLENE GLYCOL 3350 17 G/17G
17 POWDER, FOR SOLUTION ORAL
Qty: 0 | Refills: 0 | DISCHARGE
Start: 2024-08-17

## 2024-08-17 RX ORDER — LIDOCAINE HYDROCHLORIDE 20 MG/ML
1 JELLY TOPICAL
Qty: 0 | Refills: 0 | DISCHARGE
Start: 2024-08-17

## 2024-08-17 RX ADMIN — LOSARTAN POTASSIUM 25 MILLIGRAM(S): 100 TABLET, FILM COATED ORAL at 05:08

## 2024-08-17 RX ADMIN — Medication 1000 MILLIGRAM(S): at 06:00

## 2024-08-17 RX ADMIN — LIDOCAINE HYDROCHLORIDE 2 PATCH: 20 JELLY TOPICAL at 11:30

## 2024-08-17 RX ADMIN — LIDOCAINE HYDROCHLORIDE 2 PATCH: 20 JELLY TOPICAL at 02:42

## 2024-08-17 RX ADMIN — ENOXAPARIN SODIUM 40 MILLIGRAM(S): 100 INJECTION SUBCUTANEOUS at 05:08

## 2024-08-17 RX ADMIN — CLOPIDOGREL BISULFATE 75 MILLIGRAM(S): 75 TABLET, FILM COATED ORAL at 11:30

## 2024-08-17 RX ADMIN — Medication 1000 MILLIGRAM(S): at 05:08

## 2024-08-18 LAB
CULTURE RESULTS: SIGNIFICANT CHANGE UP
CULTURE RESULTS: SIGNIFICANT CHANGE UP
SPECIMEN SOURCE: SIGNIFICANT CHANGE UP
SPECIMEN SOURCE: SIGNIFICANT CHANGE UP

## 2024-12-09 NOTE — H&P ADULT - PROBLEM SELECTOR PROBLEM 6
Writer left voicemail to inform patient that we are waiting for a reply regarding bridging from Dr. Cotton, [facility] to reach out to patient prior to start of warfarin hold on 12/12/24.    Iram Lubin RN  [facility]       HTN (hypertension)

## 2024-12-12 NOTE — PROGRESS NOTE ADULT - PROBLEM SELECTOR PROBLEM 2
December 17, 2024        Sully Haney  [address] [place] [id number]    To Whom It May Concern:    This is to certify Sully Haney was evaluated on 12/12/24 and is unable to return to work.    CDC guidelines for return to work are as follows:    Sully Haney should self-isolate for 5 days.  After isolation is completed, she may return to work 12/18, wearing a mask while around others.  Perform a home COVID-19 test on days 6 and 8. If both are negative, masking can be discontinued.    **The loss of taste and smell may persist for weeks or months after recovery and do not need to delay the end of isolation.     Per CDC recommendations, employers should not require a COVID-19 test result or a healthcare provider’s note for employees who are sick to validate their illness, qualify for sick leave, or to return to work.  The Coronavirus is a rapidly evolving situation and recommendations are changing regularly to prevent spread of the disease and further loss of life.    Thank you for your understanding during these unusual times.     Electronically signed by:     Lakia Lei MD                 [address] [id number]  
CHF exacerbation

## 2025-01-02 ENCOUNTER — INPATIENT (INPATIENT)
Facility: HOSPITAL | Age: 89
LOS: 7 days | Discharge: HOME | DRG: 193 | End: 2025-01-10
Attending: INTERNAL MEDICINE | Admitting: INTERNAL MEDICINE
Payer: COMMERCIAL

## 2025-01-02 VITALS
DIASTOLIC BLOOD PRESSURE: 50 MMHG | WEIGHT: 110.01 LBS | HEART RATE: 104 BPM | RESPIRATION RATE: 22 BRPM | TEMPERATURE: 99 F | SYSTOLIC BLOOD PRESSURE: 100 MMHG | OXYGEN SATURATION: 100 %

## 2025-01-02 DIAGNOSIS — J10.1 INFLUENZA DUE TO OTHER IDENTIFIED INFLUENZA VIRUS WITH OTHER RESPIRATORY MANIFESTATIONS: ICD-10-CM

## 2025-01-02 DIAGNOSIS — E11.9 TYPE 2 DIABETES MELLITUS WITHOUT COMPLICATIONS: ICD-10-CM

## 2025-01-02 DIAGNOSIS — Z29.9 ENCOUNTER FOR PROPHYLACTIC MEASURES, UNSPECIFIED: ICD-10-CM

## 2025-01-02 DIAGNOSIS — J11.1 INFLUENZA DUE TO UNIDENTIFIED INFLUENZA VIRUS WITH OTHER RESPIRATORY MANIFESTATIONS: ICD-10-CM

## 2025-01-02 DIAGNOSIS — I50.9 HEART FAILURE, UNSPECIFIED: ICD-10-CM

## 2025-01-02 DIAGNOSIS — I10 ESSENTIAL (PRIMARY) HYPERTENSION: ICD-10-CM

## 2025-01-02 DIAGNOSIS — Z86.73 PERSONAL HISTORY OF TRANSIENT ISCHEMIC ATTACK (TIA), AND CEREBRAL INFARCTION WITHOUT RESIDUAL DEFICITS: ICD-10-CM

## 2025-01-02 LAB
ALBUMIN SERPL ELPH-MCNC: 3.7 G/DL — SIGNIFICANT CHANGE UP (ref 3.5–5)
ALP SERPL-CCNC: 239 U/L — HIGH (ref 40–120)
ALT FLD-CCNC: 25 U/L DA — SIGNIFICANT CHANGE UP (ref 10–60)
ANION GAP SERPL CALC-SCNC: 13 MMOL/L — SIGNIFICANT CHANGE UP (ref 5–17)
AST SERPL-CCNC: 36 U/L — SIGNIFICANT CHANGE UP (ref 10–40)
BASE EXCESS BLDV CALC-SCNC: -3.8 MMOL/L — SIGNIFICANT CHANGE UP
BASOPHILS # BLD AUTO: 0.03 K/UL — SIGNIFICANT CHANGE UP (ref 0–0.2)
BASOPHILS NFR BLD AUTO: 0.2 % — SIGNIFICANT CHANGE UP (ref 0–2)
BILIRUB SERPL-MCNC: 0.7 MG/DL — SIGNIFICANT CHANGE UP (ref 0.2–1.2)
BUN SERPL-MCNC: 27 MG/DL — HIGH (ref 7–18)
CALCIUM SERPL-MCNC: 8.5 MG/DL — SIGNIFICANT CHANGE UP (ref 8.4–10.5)
CHLORIDE SERPL-SCNC: 93 MMOL/L — LOW (ref 96–108)
CO2 SERPL-SCNC: 21 MMOL/L — LOW (ref 22–31)
CREAT SERPL-MCNC: 1.26 MG/DL — SIGNIFICANT CHANGE UP (ref 0.5–1.3)
D DIMER BLD IA.RAPID-MCNC: 381 NG/ML DDU — HIGH
EGFR: 40 ML/MIN/1.73M2 — LOW
EOSINOPHIL # BLD AUTO: 0 K/UL — SIGNIFICANT CHANGE UP (ref 0–0.5)
EOSINOPHIL NFR BLD AUTO: 0 % — SIGNIFICANT CHANGE UP (ref 0–6)
FLUAV AG NPH QL: DETECTED
FLUBV AG NPH QL: SIGNIFICANT CHANGE UP
GLUCOSE SERPL-MCNC: 145 MG/DL — HIGH (ref 70–99)
HCO3 BLDV-SCNC: 23 MMOL/L — SIGNIFICANT CHANGE UP (ref 22–29)
HCT VFR BLD CALC: 42.6 % — SIGNIFICANT CHANGE UP (ref 34.5–45)
HGB BLD-MCNC: 14.4 G/DL — SIGNIFICANT CHANGE UP (ref 11.5–15.5)
IMM GRANULOCYTES NFR BLD AUTO: 0.5 % — SIGNIFICANT CHANGE UP (ref 0–0.9)
LYMPHOCYTES # BLD AUTO: 0.59 K/UL — LOW (ref 1–3.3)
LYMPHOCYTES # BLD AUTO: 4.6 % — LOW (ref 13–44)
MCHC RBC-ENTMCNC: 30.3 PG — SIGNIFICANT CHANGE UP (ref 27–34)
MCHC RBC-ENTMCNC: 33.8 G/DL — SIGNIFICANT CHANGE UP (ref 32–36)
MCV RBC AUTO: 89.5 FL — SIGNIFICANT CHANGE UP (ref 80–100)
MONOCYTES # BLD AUTO: 0.67 K/UL — SIGNIFICANT CHANGE UP (ref 0–0.9)
MONOCYTES NFR BLD AUTO: 5.2 % — SIGNIFICANT CHANGE UP (ref 2–14)
NEUTROPHILS # BLD AUTO: 11.57 K/UL — HIGH (ref 1.8–7.4)
NEUTROPHILS NFR BLD AUTO: 89.5 % — HIGH (ref 43–77)
NRBC # BLD: 0 /100 WBCS — SIGNIFICANT CHANGE UP (ref 0–0)
NT-PROBNP SERPL-SCNC: 1433 PG/ML — HIGH (ref 0–450)
PCO2 BLDV: 48 MMHG — HIGH (ref 39–42)
PH BLDV: 7.29 — LOW (ref 7.32–7.43)
PLATELET # BLD AUTO: 240 K/UL — SIGNIFICANT CHANGE UP (ref 150–400)
PO2 BLDV: 29 MMHG — SIGNIFICANT CHANGE UP
POTASSIUM SERPL-MCNC: 3.3 MMOL/L — LOW (ref 3.5–5.3)
POTASSIUM SERPL-SCNC: 3.3 MMOL/L — LOW (ref 3.5–5.3)
PROT SERPL-MCNC: 8.2 G/DL — SIGNIFICANT CHANGE UP (ref 6–8.3)
RBC # BLD: 4.76 M/UL — SIGNIFICANT CHANGE UP (ref 3.8–5.2)
RBC # FLD: 12.9 % — SIGNIFICANT CHANGE UP (ref 10.3–14.5)
RSV RNA NPH QL NAA+NON-PROBE: SIGNIFICANT CHANGE UP
SAO2 % BLDV: 44.3 % — SIGNIFICANT CHANGE UP
SARS-COV-2 RNA SPEC QL NAA+PROBE: SIGNIFICANT CHANGE UP
SODIUM SERPL-SCNC: 127 MMOL/L — LOW (ref 135–145)
TROPONIN I, HIGH SENSITIVITY RESULT: 33.7 NG/L — SIGNIFICANT CHANGE UP
WBC # BLD: 12.92 K/UL — HIGH (ref 3.8–10.5)
WBC # FLD AUTO: 12.92 K/UL — HIGH (ref 3.8–10.5)

## 2025-01-02 PROCEDURE — 99291 CRITICAL CARE FIRST HOUR: CPT

## 2025-01-02 PROCEDURE — 71045 X-RAY EXAM CHEST 1 VIEW: CPT | Mod: 26

## 2025-01-02 PROCEDURE — 93010 ELECTROCARDIOGRAM REPORT: CPT

## 2025-01-02 RX ORDER — INSULIN LISPRO 100/ML
VIAL (ML) SUBCUTANEOUS
Refills: 0 | Status: DISCONTINUED | OUTPATIENT
Start: 2025-01-02 | End: 2025-01-10

## 2025-01-02 RX ORDER — ACETAMINOPHEN 80 MG/.8ML
650 SOLUTION/ DROPS ORAL EVERY 6 HOURS
Refills: 0 | Status: DISCONTINUED | OUTPATIENT
Start: 2025-01-02 | End: 2025-01-10

## 2025-01-02 RX ORDER — MAG HYDROX/ALUMINUM HYD/SIMETH 200-200-20
30 SUSPENSION, ORAL (FINAL DOSE FORM) ORAL EVERY 4 HOURS
Refills: 0 | Status: DISCONTINUED | OUTPATIENT
Start: 2025-01-02 | End: 2025-01-04

## 2025-01-02 RX ORDER — METOPROLOL TARTRATE 50 MG
5 TABLET ORAL ONCE
Refills: 0 | Status: COMPLETED | OUTPATIENT
Start: 2025-01-02 | End: 2025-01-02

## 2025-01-02 RX ORDER — HEPARIN SODIUM 1000 [USP'U]/ML
5000 INJECTION, SOLUTION INTRAVENOUS; SUBCUTANEOUS EVERY 12 HOURS
Refills: 0 | Status: DISCONTINUED | OUTPATIENT
Start: 2025-01-02 | End: 2025-01-03

## 2025-01-02 RX ORDER — ONDANSETRON 4 MG/1
4 TABLET ORAL EVERY 8 HOURS
Refills: 0 | Status: DISCONTINUED | OUTPATIENT
Start: 2025-01-02 | End: 2025-01-10

## 2025-01-02 RX ORDER — DEXAMETHASONE SODIUM PHOSPHATE 4 MG/ML
6 VIAL (ML) INJECTION ONCE
Refills: 0 | Status: COMPLETED | OUTPATIENT
Start: 2025-01-02 | End: 2025-01-03

## 2025-01-02 RX ORDER — OSELTAMIVIR 75 MG/1
30 CAPSULE ORAL DAILY
Refills: 0 | Status: DISCONTINUED | OUTPATIENT
Start: 2025-01-02 | End: 2025-01-03

## 2025-01-02 RX ORDER — SODIUM CHLORIDE 9 MG/ML
1000 INJECTION, SOLUTION INTRAVENOUS
Refills: 0 | Status: DISCONTINUED | OUTPATIENT
Start: 2025-01-02 | End: 2025-01-03

## 2025-01-02 RX ORDER — GINKGO BILOBA 40 MG
3 CAPSULE ORAL AT BEDTIME
Refills: 0 | Status: DISCONTINUED | OUTPATIENT
Start: 2025-01-02 | End: 2025-01-10

## 2025-01-02 RX ORDER — INSULIN LISPRO 100/ML
VIAL (ML) SUBCUTANEOUS AT BEDTIME
Refills: 0 | Status: DISCONTINUED | OUTPATIENT
Start: 2025-01-02 | End: 2025-01-10

## 2025-01-02 RX ADMIN — Medication 5 MILLIGRAM(S): at 18:46

## 2025-01-02 NOTE — ED ADULT NURSE NOTE - ED STAT RN HANDOFF DETAILS
Patient endorsed to SANJAY Cabello. Patient in stable condition on BIPAP. Granddaughter at bedside. No distress noted.

## 2025-01-02 NOTE — ED PROVIDER NOTE - CARDIAC, MLM
Normal rate, regular rhythm.  Heart sounds S1, S2.  No murmurs, rubs or gallops. lower b/l leg non-pitting edema

## 2025-01-02 NOTE — ED PROVIDER NOTE - OBJECTIVE STATEMENT
89 yr old female with hx of HTN, DM, HLD, CHF, CVA (> 15 years on plavix) presents c/o cough x 4 days and sob today. no fever, no cp. base weigh 120 lbs

## 2025-01-02 NOTE — ED PROVIDER NOTE - CRITICAL CARE ATTENDING CONTRIBUTION TO CARE
latham: Due to the presence of acute dyspnea and tachy and the risk of sudden rapid deterioration due to my attendance to this patient required critical care time of approx 40 minutes including assessment/reassessment, documentation, ordering and interpreting ancillary studies, discussion with ED staff and consultants, patient and their family, and excludes time spent in teaching of Residents and time spent on separately billable procedures.

## 2025-01-02 NOTE — H&P ADULT - PROBLEM SELECTOR PLAN 4
continue with home meds with holding parameters  Will start metoprolol 25 twice daily in setting of A-fib with RVR continue with home meds with holding parameters

## 2025-01-02 NOTE — ED PROVIDER NOTE - PROGRESS NOTE DETAILS
Murray: Patient became acutely tachypneic per RN.  lab D-dimer negative based on age-adjusted.  Chest x-ray no pneumothorax, no consolidation, no effusion.  Patient is tachypneic sitting upright congested.  Will place on BiPAP.  Will repeat vitals and if able to will give Lasix.  Will repeat EKG as well. latham: afib RVR- bipap, metoprolol latham: Placed on BiPAP,  metoprolol improved heart rate now at 64-65. Murray: flu A+. admit

## 2025-01-02 NOTE — H&P ADULT - ATTENDING COMMENTS
89 yr old femaleFrom home, ambulatory and independent, with hx of HTN, DM, HLD, CHF, CVA (> 15 years on plavix) presents c/o cough x 4 days and sob on day of admission,Flu+,PAF with RVR,hyponatremia.  1.Flu-Tamiflu.  2.PAF-eliquis,low dose b blocker and multaq.  3.CVA-eliquis,statin.  4.Hyponatremia-d/c demdex.  5.HTN-d/c norvasc,cozaar.  6.DM-Insulin.  7.Lipid d/o-statin.  8.PPI.

## 2025-01-02 NOTE — H&P ADULT - PROBLEM SELECTOR PLAN 1
Status post BiPAP in the ER, now saturating well on 2 L nasal cannula  No indication for antibiotics currently, no sign of bacterial superimposition  DuoNeb every 6 as needed  Renally dosed Tamiflu for 5 days  Supportive care  Isolation precautions Status post BiPAP in the ER, now saturating well on 2 L nasal cannula  s/p decadron  No indication for antibiotics currently, no sign of bacterial superimposition  DuoNeb every 6 as needed  Renally dosed Tamiflu for 5 days  Supportive care  Isolation precautions

## 2025-01-02 NOTE — H&P ADULT - PROBLEM SELECTOR PROBLEM 6
Physical Therapy    Visit Type: initial evaluation and discharge  SUBJECTIVE  Patient agreed to participate in therapy this date.  RN in agreement to work with patient for therapy session.  Right foot has bursitis and sports injuries. No pain during PT mobility eval. Pt asking about return to golfing - he heard he could return to golfing after 5 days.  Pt is going to stay in a hotel for 2 days close to family and medical care prior to returning home to [facility].  Patient / Family Goal: return to previous functional status    Pain   Patient denies pain.     OBJECTIVE     Cognitive Status   Orientation    - Oriented to: person, place, time and situation  Functional Communication   - Forms of Communication: verbal  Following Direction   - follows all commands and directions consistently  Safety Awareness/Insight   - intact  Awareness of Deficits   - fully aware of deficits    Vitals:  Tele worn; no alarms reported during PT.    Patient Activity Tolerance: no rest required    Range of Motion (ROM)   (degrees unless noted; active unless noted; norms in ( ); negative=lacking to 0, positive=beyond 0)  WFL: LLE, RLE    Strength  (out of 5 unless noted, standard test position unless noted)   WFL: LLE, RLE    Sitting Balance  (MARLEN = base of support)  Static      - Trial 1 details: independent, with back unsupported and with double LE support    Standing Balance  (MARLEN = base of support)  [facility]     Bed Mobility  - Supine to sit: independent, with verbal cues  Prior to performing task, therapist reviewed post cath right wrist precautions.  Transfers  Assistive devices: gait belt, none  - Sit to stand: modified independent  - Stand to sit: modified independent  Bed and recliner. Pt performed without use of right UE    Ambulation / Gait  - Assistive device: gait belt and none  - Distance (feet unless otherwise indicated): 200  (not for distance)  - Assist Level: independent  - Surface: even  -  Description: step through  Sneakers worn    Stair Ambulation  - Number of steps: 18;   - Assist Level: modified independent  - Rails: right  - Pattern: forward and reciprocal  Prior to performing, PT instructed in keeping neutral right wrist which pt demonstrated   Interventions     Training provided: activity tolerance, safety training, functional ambulation and bed mobility training    Skilled input: Verbal instruction/cues  Verbal Consent: Writer verbally educated and received verbal consent for hand placement, positioning of patient, and techniques to be performed today from patient for clothing adjustments for techniques, hand placement and palpation for techniques and therapist position for techniques as described above and how they are pertinent to the patient's plan of care.       Education:   - Present and ready to learn: patient  Education provided during session:  - Results of above outlined education: Verbalizes understanding and Demonstrates understanding    ASSESSMENT   Discharge needs based on today's assessment:   - Current level of function: at baseline level of function   - Therapy needs at discharge: does not require ongoing therapy    AM-PAC  - Generalized Prior Level of Function: IND/MOD I ([facility] 22-24)       Key: MOD A=moderate assistance, IND/MOD I=independent/modified independent  - Generalized Current Level of Function     - Current Mobility Score: 24       AM-PAC Scoring Key= >21 Modified Independent; 20-21 Supervision; 18-19 Minimal assist; 13-17 Moderate assist; 9-12 Max assist; <9 Total assist       • Predicted patient presentation: Low (stable) - Patient comorbidities and complexities, as defined above, will have little effect on progress for prescribed plan of care.    PLAN (while hospitalized)  Suggestions for next session as indicated:   PT Frequency: DC PT      PT/OT Mobility Equipment for Discharge: none  Agreement to plan and goals: patient agrees with goals and treatment  plan    Documented in the chart in the following areas: Prior Level of Function. Assessment/Plan.      Patient at End of Session:   Location: in chair  Safety measures: call light within reach  Handoff to: nurse      Therapy procedure time and total treatment time can be found documented on the Time Entry flowsheet   Prophylactic measure History of CVA in adulthood

## 2025-01-02 NOTE — H&P ADULT - NSHPPHYSICALEXAM_GEN_ALL_CORE
Gen: AOx3, NAD, non-toxic, pleasant  HEAD:  Atraumatic, Normocephalic  EYES: PERRLA, conjunctiva and sclera clear  ENT: Moist mucous membranes  NECK: Supple, No JVD  CV: S1+S2 normal, no murmurs   Resp: Clear bilat, no resp distress, no crackles/wheezes  Abd: Soft, nontender, +BS  Ext: No LE edema, no cyanosis, LE pulses present  IV/Skin: No skin rash  Msk: No joint swelling  Neuro: AAOx3. No focal deficits  Psych: no anxiety, no delusional ideas, no suicidal ideation Gen: AOx3, NAD, non-toxic, pleasant; elderly   HEAD:  Atraumatic, Normocephalic  EYES: PERRLA, conjunctiva and sclera clear  ENT: Moist mucous membranes  NECK: Supple, No JVD  CV: +irregularly irregular, rate controlled  Resp: +bilateral anterior wheezing but otherwise clear   Abd: Soft, nontender, +BS  Ext: No LE edema, no cyanosis, LE pulses present  IV/Skin: No skin rash  Msk: No joint swelling  Neuro: AAOx3. No focal deficits  Psych: no anxiety, no delusional ideas, no suicidal ideation

## 2025-01-02 NOTE — ED PROVIDER NOTE - WR INTERPRETED BY 1
-- DO NOT REPLY / DO NOT REPLY ALL --  -- Message is from Engagement Center Operations ([facility]) --    Offered Waitlist if Available for the Visit Type? Yes    Caller is requesting an appointment - at a sooner time than what was available.      Caller wants sooner appointment - offered other approved options    Reason for Visit: lump on neck     Is the patient currently scheduled? No    Preferred time to be seen: soon as possible with any provider       Caller Information       Type Contact Phone/Fax    11/20/2023 11:57 AM CST Phone (Incoming)            Alternative phone number:     Can a detailed message be left? Yes    Message Turnaround:     [place]:    Please give this turnaround time to the caller:   \"This message will be sent to [state Provider's name]. The clinical team will fulfill your request as soon as they review your message.\"  
I'm not taking new patients right now.  Can see another provider.   
Patient was given info to [facility] appt.   
Jose Murray Y

## 2025-01-02 NOTE — ED PROVIDER NOTE - CONSTITUTIONAL, MLM
normal... awake, alert, oriented to person, place, time/situation and in no apparent distress. sitting upright

## 2025-01-02 NOTE — H&P ADULT - HISTORY OF PRESENT ILLNESS
89 yr old female with hx of HTN, DM, HLD, CHF, CVA (> 15 years on plavix) presents c/o cough x 4 days and sob today. no fever, no cp. base weigh 120 lbs    ED Course    Vitals: BPmin 100/50; RRmax 35; O2 93% on RA  Labs: pH 7.29 (CO2 48); flu A pos; wbc 13; Na 127; K 3.3; BUN/Cr 27/1.26  Intervention: decadron, lopressor, bipap  Consults: none  Images: CXR No acute cardiopulmonary disease process. Cardiomegaly.       89 yr old femaleFrom home, ambulatory and independent, with hx of HTN, DM, HLD, CHF, CVA (> 15 years on plavix) presents c/o cough x 4 days and sob on day of admission.  In the emergency room she was started on BiPAP  Because of increased work of breathing and respiratory rate to 35, but did not tolerate it well. She was then transitioned to nasal cannula and is saturating well on 2 L.  She was also found to be in Afib with RVR to 193.  She was given Lopressor and her rate improve. She reports some shortness of breath and dizziness but is otherwise doing well.  She tested positive for influenza A.  She will be admitted for influenza requiring oxygen support.     ED Course    Vitals: BPmin 100/50; HRmax 193 (afib rvr) RRmax 35; O2 93% on RA  Labs: pH 7.29 (CO2 48); flu A pos; wbc 13; Na 127; K 3.3; BUN/Cr 27/1.26  Intervention: decadron, lopressor, bipap  Consults: none  Images: CXR No acute cardiopulmonary disease process. Cardiomegaly.       89 yr old femaleFrom home, ambulatory and independent, with hx of HTN, DM, HLD, CHF, CVA (> 15 years on plavix) presents c/o cough x 4 days and sob on day of admission.  In the emergency room she was started on BiPAP  Because of increased work of breathing and respiratory rate to 35, but did not tolerate it well. She was then transitioned to nasal cannula and is saturating well on 2 L.  She was also found to be in Afib with RVR to 193.  She was given Lopressor and her rate improve. She reports some shortness of breath and dizziness but is otherwise doing well.  She tested positive for influenza A.  She will be admitted for influenza requiring oxygen support.     ED Course    Vitals: BPmin 100/50; HRmax 193 (afib rvr) RRmax 35; O2 93% on RA  Labs: pH 7.29 (CO2 48); flu A pos; wbc 13; Na 127; K 3.3; BUN/Cr 27/1.26  Intervention: decadron, IV lopressor 5, bipap  Consults: none  Images: CXR No acute cardiopulmonary disease process. Cardiomegaly.

## 2025-01-02 NOTE — H&P ADULT - NSHPREVIEWOFSYSTEMS_GEN_ALL_CORE
CONSTITUTIONAL:  No fevers or chills, good appetite, good general state  EYES/ENT:  No visual changes;  No vertigo or throat pain   NECK:  No neck pain or stiffness  RESPIRATORY:  No cough, wheezing, hemoptysis; No shortness of breath  CARDIOVASCULAR:  No chest pain or palpitations  GASTROINTESTINAL:  No abdominal pain. No nausea, vomiting, or hematemesis; No diarrhea or constipation. No melena or hematochezia.  GENITOURINARY:  No dysuria, frequency or hematuria  NEUROLOGICAL:  No HA, no numbness or LE weakness  MSK: no back pain, no joint pain  SKIN:  No itching, no skin rash CONSTITUTIONAL:  No fevers or chills, good appetite, good general state  EYES/ENT:  No visual changes;  No vertigo or throat pain   NECK:  No neck pain or stiffness  RESPIRATORY: +cough +sob +wheezing  CARDIOVASCULAR:  No chest pain or palpitations  GASTROINTESTINAL:  No abdominal pain. No nausea, vomiting, or hematemesis; No diarrhea or constipation. No melena or hematochezia.  GENITOURINARY:  No dysuria, frequency or hematuria  NEUROLOGICAL:  No HA, no numbness or LE weakness  MSK: no back pain, no joint pain  SKIN:  No itching, no skin rash

## 2025-01-02 NOTE — H&P ADULT - PROBLEM SELECTOR PLAN 2
EKG with A-fib plus RVR rhythm to 193 in the emergency room  Does not seem to be on beta-blocker, will start metoprolol 25 twice daily  Per chart review, unclear if has A-fib in the past  Admit to telemetry for monitoring  Cardio consult needed EKG with A-fib plus RVR rhythm to 193 in the emergency room  Does not seem to be on beta-blocker outpatient   Per chart review, unclear if has A-fib in the past  Admit to telemetry for monitoring  Cardio consult needed EKG with A-fib plus RVR rhythm to 193 in the emergency room  Does not seem to be on beta-blocker outpatient   Per chart review, unclear if has A-fib in the past  Admit to telemetry for monitoring

## 2025-01-02 NOTE — ED PROVIDER NOTE - CARE PLAN
Principal Discharge DX:	CHF exacerbation   1 Principal Discharge DX:	CHF exacerbation  Secondary Diagnosis:	Atrial fibrillation with RVR   Principal Discharge DX:	Influenza  Secondary Diagnosis:	Atrial fibrillation with RVR

## 2025-01-02 NOTE — ED ADULT NURSE NOTE - ED STAT RN HANDOFF WHERE
1. Do you have an Epinephrine auto injector with you? Yes    2. What antihistamine did you take today (Examples: Zyrtec/Cetirizine, Claritin, Xyzal, Benadryl, Allegra)? Allegra    3. If you have asthma: Have you had any shortness of breath, wheezing or cough within the last 48 hours? No    4. Since your last injection, have you been seen in the Emergency Department or Urgent Care Clinic for your breathing? no    5. Are you taking any Beta Blocker Medications (examples: Metoprolol, Atenolol)?  No    6. Women of Childbearing Age: Since your last allergy injection, have you become pregnant or do you think that you are pregnant?  No    7. Have you been prescribed any antibiotics in the last 5 days? No    8. Have you had a reaction to your last allergy injection? If yes, what was your reaction? No   > Any symptoms other than at the site of injection (Generalized itching, breathing difficulty, redness, hives, swelling, etc.) No     ED

## 2025-01-02 NOTE — H&P ADULT - ASSESSMENT
89 yr old female with hx of HTN, DM, HLD, CHF, CVA (> 15 years on plavix) presents c/o cough x 4 days and sob today. no fever, no cp. base weigh 120 lbs 89 yr old female with hx of HTN, DM, HLD, CHF, CVA (> 15 years on plavix)   Admitted for influenza requiring oxygen support.  She has no evidence of bacterial pneumonia so antibiotics will be held.  She was noted to be in A-fib with RVR on admission but became rate controlled after Lopressor was given.  She will be admitted to telemetry for monitoring and oxygen support

## 2025-01-02 NOTE — ED ADULT NURSE NOTE - NSFALLUNIVINTERV_ED_ALL_ED
Bed/Stretcher in lowest position, wheels locked, appropriate side rails in place/Call bell, personal items and telephone in reach/Instruct patient to call for assistance before getting out of bed/chair/stretcher/Non-slip footwear applied when patient is off stretcher/[place] to call system/Physically safe environment - no spills, clutter or unnecessary equipment/Purposeful proactive rounding/Room/bathroom lighting operational, light cord in reach

## 2025-01-02 NOTE — ED PROVIDER NOTE - CLINICAL SUMMARY MEDICAL DECISION MAKING FREE TEXT BOX
89 yr old female with hx of HTN, DM, HLD, CHF, CVA (> 15 years on plavix) presents c/o cough x 4 days and sob today. no fever, no cp. base weigh 120 lbs    viral uri vs acs vs pe? vs pna vs pleural effusion- labs, xr, lasix, swab, meds

## 2025-01-02 NOTE — ED PROVIDER NOTE - NS ED MD EKG INTERPRETATION 2
Attempted to contact patient about scheduling a Colonoscopy. Left patient a message to give office a call back.  
sinus arrhyhtmia, no active ischemia

## 2025-01-03 DIAGNOSIS — Z75.8 OTHER PROBLEMS RELATED TO MEDICAL FACILITIES AND OTHER HEALTH CARE: ICD-10-CM

## 2025-01-03 DIAGNOSIS — J96.01 ACUTE RESPIRATORY FAILURE WITH HYPOXIA: ICD-10-CM

## 2025-01-03 DIAGNOSIS — I48.0 PAROXYSMAL ATRIAL FIBRILLATION: ICD-10-CM

## 2025-01-03 DIAGNOSIS — I48.91 UNSPECIFIED ATRIAL FIBRILLATION: ICD-10-CM

## 2025-01-03 DIAGNOSIS — E87.1 HYPO-OSMOLALITY AND HYPONATREMIA: ICD-10-CM

## 2025-01-03 LAB
A1C WITH ESTIMATED AVERAGE GLUCOSE RESULT: 5.9 % — HIGH (ref 4–5.6)
ANION GAP SERPL CALC-SCNC: 13 MMOL/L — SIGNIFICANT CHANGE UP (ref 5–17)
BASOPHILS # BLD AUTO: 0.02 K/UL — SIGNIFICANT CHANGE UP (ref 0–0.2)
BASOPHILS NFR BLD AUTO: 0.2 % — SIGNIFICANT CHANGE UP (ref 0–2)
BUN SERPL-MCNC: 33 MG/DL — HIGH (ref 7–18)
CALCIUM SERPL-MCNC: 8.5 MG/DL — SIGNIFICANT CHANGE UP (ref 8.4–10.5)
CHLORIDE SERPL-SCNC: 97 MMOL/L — SIGNIFICANT CHANGE UP (ref 96–108)
CO2 SERPL-SCNC: 21 MMOL/L — LOW (ref 22–31)
CREAT SERPL-MCNC: 1.3 MG/DL — SIGNIFICANT CHANGE UP (ref 0.5–1.3)
EGFR: 39 ML/MIN/1.73M2 — LOW
EOSINOPHIL # BLD AUTO: 0 K/UL — SIGNIFICANT CHANGE UP (ref 0–0.5)
EOSINOPHIL NFR BLD AUTO: 0 % — SIGNIFICANT CHANGE UP (ref 0–6)
ESTIMATED AVERAGE GLUCOSE: 123 MG/DL — HIGH (ref 68–114)
GLUCOSE BLDC GLUCOMTR-MCNC: 110 MG/DL — HIGH (ref 70–99)
GLUCOSE BLDC GLUCOMTR-MCNC: 118 MG/DL — HIGH (ref 70–99)
GLUCOSE BLDC GLUCOMTR-MCNC: 147 MG/DL — HIGH (ref 70–99)
GLUCOSE BLDC GLUCOMTR-MCNC: 175 MG/DL — HIGH (ref 70–99)
GLUCOSE BLDC GLUCOMTR-MCNC: 85 MG/DL — SIGNIFICANT CHANGE UP (ref 70–99)
GLUCOSE SERPL-MCNC: 105 MG/DL — HIGH (ref 70–99)
HCT VFR BLD CALC: 40.5 % — SIGNIFICANT CHANGE UP (ref 34.5–45)
HGB BLD-MCNC: 13.7 G/DL — SIGNIFICANT CHANGE UP (ref 11.5–15.5)
IMM GRANULOCYTES NFR BLD AUTO: 0.7 % — SIGNIFICANT CHANGE UP (ref 0–0.9)
LYMPHOCYTES # BLD AUTO: 0.69 K/UL — LOW (ref 1–3.3)
LYMPHOCYTES # BLD AUTO: 5.7 % — LOW (ref 13–44)
MCHC RBC-ENTMCNC: 29.7 PG — SIGNIFICANT CHANGE UP (ref 27–34)
MCHC RBC-ENTMCNC: 33.8 G/DL — SIGNIFICANT CHANGE UP (ref 32–36)
MCV RBC AUTO: 87.7 FL — SIGNIFICANT CHANGE UP (ref 80–100)
MONOCYTES # BLD AUTO: 0.7 K/UL — SIGNIFICANT CHANGE UP (ref 0–0.9)
MONOCYTES NFR BLD AUTO: 5.8 % — SIGNIFICANT CHANGE UP (ref 2–14)
NEUTROPHILS # BLD AUTO: 10.65 K/UL — HIGH (ref 1.8–7.4)
NEUTROPHILS NFR BLD AUTO: 87.6 % — HIGH (ref 43–77)
NRBC # BLD: 0 /100 WBCS — SIGNIFICANT CHANGE UP (ref 0–0)
PLATELET # BLD AUTO: 234 K/UL — SIGNIFICANT CHANGE UP (ref 150–400)
POTASSIUM SERPL-MCNC: 3.5 MMOL/L — SIGNIFICANT CHANGE UP (ref 3.5–5.3)
POTASSIUM SERPL-SCNC: 3.5 MMOL/L — SIGNIFICANT CHANGE UP (ref 3.5–5.3)
RBC # BLD: 4.62 M/UL — SIGNIFICANT CHANGE UP (ref 3.8–5.2)
RBC # FLD: 13.2 % — SIGNIFICANT CHANGE UP (ref 10.3–14.5)
SODIUM SERPL-SCNC: 131 MMOL/L — LOW (ref 135–145)
WBC # BLD: 12.14 K/UL — HIGH (ref 3.8–10.5)
WBC # FLD AUTO: 12.14 K/UL — HIGH (ref 3.8–10.5)

## 2025-01-03 RX ORDER — EMPAGLIFLOZIN 25 MG/1
1 TABLET, FILM COATED ORAL
Refills: 0 | DISCHARGE

## 2025-01-03 RX ORDER — INFLUENZA A VIRUS A/WISCONSIN/588/2019 (H1N1) RECOMBINANT HEMAGGLUTININ ANTIGEN, INFLUENZA A VIRUS A/DARWIN/6/2021 (H3N2) RECOMBINANT HEMAGGLUTININ ANTIGEN, INFLUENZA B VIRUS B/AUSTRIA/1359417/2021 RECOMBINANT HEMAGGLUTININ ANTIGEN, AND INFLUENZA B VIRUS B/PHUKET/3073/2013 RECOMBINANT HEMAGGLUTININ ANTIGEN 45; 45; 45; 45 UG/.5ML; UG/.5ML; UG/.5ML; UG/.5ML
0.5 INJECTION INTRAMUSCULAR ONCE
Refills: 0 | Status: COMPLETED | OUTPATIENT
Start: 2025-01-03 | End: 2025-01-03

## 2025-01-03 RX ORDER — CLOPIDOGREL BISULFATE 75 MG/1
75 TABLET, FILM COATED ORAL DAILY
Refills: 0 | Status: DISCONTINUED | OUTPATIENT
Start: 2025-01-03 | End: 2025-01-03

## 2025-01-03 RX ORDER — SODIUM CHLORIDE 9 MG/ML
1000 INJECTION, SOLUTION INTRAMUSCULAR; INTRAVENOUS; SUBCUTANEOUS
Refills: 0 | Status: DISCONTINUED | OUTPATIENT
Start: 2025-01-03 | End: 2025-01-04

## 2025-01-03 RX ORDER — ENOXAPARIN SODIUM 60 MG/.6ML
50 INJECTION INTRAVENOUS; SUBCUTANEOUS EVERY 24 HOURS
Refills: 0 | Status: DISCONTINUED | OUTPATIENT
Start: 2025-01-03 | End: 2025-01-03

## 2025-01-03 RX ORDER — APIXABAN 5 MG/1
2.5 TABLET, FILM COATED ORAL EVERY 12 HOURS
Refills: 0 | Status: DISCONTINUED | OUTPATIENT
Start: 2025-01-03 | End: 2025-01-10

## 2025-01-03 RX ORDER — DRONEDARONE 400 MG/1
400 TABLET, FILM COATED ORAL
Refills: 0 | Status: DISCONTINUED | OUTPATIENT
Start: 2025-01-03 | End: 2025-01-05

## 2025-01-03 RX ORDER — PANTOPRAZOLE 40 MG/1
40 TABLET, DELAYED RELEASE ORAL
Refills: 0 | Status: DISCONTINUED | OUTPATIENT
Start: 2025-01-03 | End: 2025-01-05

## 2025-01-03 RX ORDER — METOPROLOL TARTRATE 50 MG
12.5 TABLET ORAL
Refills: 0 | Status: DISCONTINUED | OUTPATIENT
Start: 2025-01-03 | End: 2025-01-10

## 2025-01-03 RX ORDER — IPRATROPIUM BROMIDE AND ALBUTEROL SULFATE .5; 2.5 MG/3ML; MG/3ML
3 SOLUTION RESPIRATORY (INHALATION) EVERY 6 HOURS
Refills: 0 | Status: DISCONTINUED | OUTPATIENT
Start: 2025-01-03 | End: 2025-01-10

## 2025-01-03 RX ORDER — LOSARTAN POTASSIUM 100 MG/1
25 TABLET, FILM COATED ORAL DAILY
Refills: 0 | Status: DISCONTINUED | OUTPATIENT
Start: 2025-01-03 | End: 2025-01-05

## 2025-01-03 RX ORDER — TORSEMIDE 10 MG/1
20 TABLET ORAL DAILY
Refills: 0 | Status: DISCONTINUED | OUTPATIENT
Start: 2025-01-03 | End: 2025-01-03

## 2025-01-03 RX ORDER — ENOXAPARIN SODIUM 60 MG/.6ML
50 INJECTION INTRAVENOUS; SUBCUTANEOUS ONCE
Refills: 0 | Status: COMPLETED | OUTPATIENT
Start: 2025-01-03 | End: 2025-01-03

## 2025-01-03 RX ORDER — OSELTAMIVIR 75 MG/1
30 CAPSULE ORAL DAILY
Refills: 0 | Status: DISCONTINUED | OUTPATIENT
Start: 2025-01-03 | End: 2025-01-04

## 2025-01-03 RX ORDER — SENNOSIDES 8.6 MG/1
2 TABLET, FILM COATED ORAL AT BEDTIME
Refills: 0 | Status: DISCONTINUED | OUTPATIENT
Start: 2025-01-03 | End: 2025-01-04

## 2025-01-03 RX ORDER — ATORVASTATIN CALCIUM 40 MG/1
10 TABLET, FILM COATED ORAL AT BEDTIME
Refills: 0 | Status: DISCONTINUED | OUTPATIENT
Start: 2025-01-03 | End: 2025-01-10

## 2025-01-03 RX ORDER — POLYETHYLENE GLYCOL 3350 17 G/DOSE
17 POWDER (GRAM) ORAL DAILY
Refills: 0 | Status: DISCONTINUED | OUTPATIENT
Start: 2025-01-03 | End: 2025-01-04

## 2025-01-03 RX ADMIN — Medication 12.5 MILLIGRAM(S): at 18:21

## 2025-01-03 RX ADMIN — IPRATROPIUM BROMIDE AND ALBUTEROL SULFATE 3 MILLILITER(S): .5; 2.5 SOLUTION RESPIRATORY (INHALATION) at 20:43

## 2025-01-03 RX ADMIN — ENOXAPARIN SODIUM 50 MILLIGRAM(S): 60 INJECTION INTRAVENOUS; SUBCUTANEOUS at 07:58

## 2025-01-03 RX ADMIN — SODIUM CHLORIDE 75 MILLILITER(S): 9 INJECTION, SOLUTION INTRAVENOUS at 07:58

## 2025-01-03 RX ADMIN — Medication 6 MILLIGRAM(S): at 08:16

## 2025-01-03 RX ADMIN — APIXABAN 2.5 MILLIGRAM(S): 5 TABLET, FILM COATED ORAL at 18:21

## 2025-01-03 RX ADMIN — DRONEDARONE 400 MILLIGRAM(S): 400 TABLET, FILM COATED ORAL at 18:22

## 2025-01-03 RX ADMIN — IPRATROPIUM BROMIDE AND ALBUTEROL SULFATE 3 MILLILITER(S): .5; 2.5 SOLUTION RESPIRATORY (INHALATION) at 14:54

## 2025-01-03 RX ADMIN — OSELTAMIVIR 30 MILLIGRAM(S): 75 CAPSULE ORAL at 14:54

## 2025-01-03 RX ADMIN — ATORVASTATIN CALCIUM 10 MILLIGRAM(S): 40 TABLET, FILM COATED ORAL at 22:14

## 2025-01-03 RX ADMIN — IPRATROPIUM BROMIDE AND ALBUTEROL SULFATE 3 MILLILITER(S): .5; 2.5 SOLUTION RESPIRATORY (INHALATION) at 03:14

## 2025-01-03 RX ADMIN — SODIUM CHLORIDE 50 MILLILITER(S): 9 INJECTION, SOLUTION INTRAMUSCULAR; INTRAVENOUS; SUBCUTANEOUS at 12:03

## 2025-01-03 RX ADMIN — SODIUM CHLORIDE 50 MILLILITER(S): 9 INJECTION, SOLUTION INTRAMUSCULAR; INTRAVENOUS; SUBCUTANEOUS at 22:46

## 2025-01-03 NOTE — PROGRESS NOTE ADULT - SUBJECTIVE AND OBJECTIVE BOX
NP Note discussed with  Primary Attending    INTERVAL HPI/OVERNIGHT EVENTS: Pt was hypoxic in the morning on room air. 88%, placed 2L NC. no other complaints.     MEDICATIONS  (STANDING):  albuterol/ipratropium for Nebulization 3 milliLiter(s) Nebulizer every 6 hours  amLODIPine   Tablet 5 milliGRAM(s) Oral daily  apixaban 2.5 milliGRAM(s) Oral every 12 hours  atorvastatin 10 milliGRAM(s) Oral at bedtime  insulin lispro (ADMELOG) corrective regimen sliding scale   SubCutaneous three times a day before meals  insulin lispro (ADMELOG) corrective regimen sliding scale   SubCutaneous at bedtime  losartan 25 milliGRAM(s) Oral daily  metoprolol tartrate 12.5 milliGRAM(s) Oral two times a day  oseltamivir 30 milliGRAM(s) Oral daily  pantoprazole    Tablet 40 milliGRAM(s) Oral before breakfast  polyethylene glycol 3350 17 Gram(s) Oral daily  senna 2 Tablet(s) Oral at bedtime  sodium chloride 0.9%. 1000 milliLiter(s) (50 mL/Hr) IV Continuous <Continuous>    MEDICATIONS  (PRN):  acetaminophen     Tablet .. 650 milliGRAM(s) Oral every 6 hours PRN Temp greater or equal to 38C (100.4F), Mild Pain (1 - 3)  aluminum hydroxide/magnesium hydroxide/simethicone Suspension 30 milliLiter(s) Oral every 4 hours PRN Dyspepsia  melatonin 3 milliGRAM(s) Oral at bedtime PRN Insomnia  ondansetron Injectable 4 milliGRAM(s) IV Push every 8 hours PRN Nausea and/or Vomiting      __________________________________________________  REVIEW OF SYSTEMS:    CONSTITUTIONAL: No fever,   EYES: no acute visual disturbances  NECK: No pain or stiffness  RESPIRATORY: No cough; No shortness of breath  CARDIOVASCULAR: No chest pain, no palpitations  GASTROINTESTINAL: No pain. No nausea or vomiting; No diarrhea   NEUROLOGICAL: No headache or numbness, no tremors  MUSCULOSKELETAL: No joint pain, no muscle pain  GENITOURINARY: no dysuria, no frequency, no hesitancy  PSYCHIATRY: no depression , no anxiety  ALL OTHER  ROS negative        Vital Signs Last 24 Hrs  T(C): 36.6 (03 Jan 2025 11:05), Max: 37.3 (02 Jan 2025 16:12)  T(F): 97.9 (03 Jan 2025 11:05), Max: 99.1 (02 Jan 2025 16:12)  HR: 76 (03 Jan 2025 11:05) (67 - 189)  BP: 119/63 (03 Jan 2025 11:05) (100/50 - 142/75)  BP(mean): 95 (02 Jan 2025 18:36) (95 - 95)  RR: 18 (03 Jan 2025 11:05) (18 - 35)  SpO2: 95% (03 Jan 2025 11:05) (88% - 100%)    Parameters below as of 03 Jan 2025 11:05  Patient On (Oxygen Delivery Method): nasal cannula  O2 Flow (L/min): 2      ________________________________________________  PHYSICAL EXAM:  GENERAL: NAD, [place], Cachectic  HEENT: Normocephalic;  conjunctivae and sclerae clear; moist mucous membranes;   NECK : supple  CHEST/LUNG: rhonchi b/l at bases. cleared after coughing   HEART: S1 S2  regular; no murmurs, gallops or rubs  ABDOMEN: Soft, Nontender, Nondistended; Bowel sounds present  EXTREMITIES: no cyanosis; no edema; no calf tenderness  SKIN: warm and dry; no rash  NERVOUS SYSTEM:  Awake and alert; Oriented  to self and place, easily forgetful.  no new deficits    _________________________________________________  LABS:                        13.7   12.14 )-----------( 234      ( 03 Jan 2025 05:40 )             40.5     01-03    131[L]  |  97  |  33[H]  ----------------------------<  105[H]  3.5   |  21[L]  |  1.30    Ca    8.5      03 Jan 2025 05:40    TPro  8.2  /  Alb  3.7  /  TBili  0.7  /  DBili  x   /  AST  36  /  ALT  25  /  AlkPhos  239[H]  01-02      Urinalysis Basic - ( 03 Jan 2025 05:40 )    Color: x / Appearance: x / SG: x / pH: x  Gluc: 105 mg/dL / Ketone: x  / Bili: x / Urobili: x   Blood: x / Protein: x / Nitrite: x   Leuk Esterase: x / RBC: x / WBC x   Sq Epi: x / Non Sq Epi: x / Bacteria: x      CAPILLARY BLOOD GLUCOSE      POCT Blood Glucose.: 85 mg/dL (03 Jan 2025 10:19)  POCT Blood Glucose.: 118 mg/dL (03 Jan 2025 00:02)    RADIOLOGY & ADDITIONAL TESTS:    Imaging  Reviewed:  YES    < from: Xray Chest 1 View- PORTABLE-Urgent (01.02.25 @ 17:57) >  IMPRESSION: No acute cardiopulmonary disease process. Cardiomegaly.    < end of copied text >    Consultant(s) Notes Reviewed:   YES      Plan of care was discussed with patient and /or primary care giver; all questions and concerns were addressed

## 2025-01-03 NOTE — CHART NOTE - NSCHARTNOTEFT_GEN_A_CORE
med rec completed via son Santi Tejada [phone]. Patient utilizes [facility] however does not have number. Family gave [facility] in [place] to use or will call to let RN know to update pharmacy information.

## 2025-01-03 NOTE — PATIENT PROFILE ADULT - FALL HARM RISK - RISK INTERVENTIONS
Assistance with ambulation/Communicate Fall Risk and Risk Factors to all staff, patient, and family/Monitor for mental status changes/Monitor gait and stability/Reinforce activity limits and safety measures with patient and family/Sit up slowly, dangle for a short time, stand at bedside before walking/Use of alarms - bed, chair and/or voice tab/Visual Cue: Yellow wristband/Bed in lowest position, wheels locked, appropriate side rails in place/Call bell, personal items and telephone in reach/Instruct patient to call for assistance before getting out of bed or chair/Non-slip footwear when patient is out of bed/[place] to call system/Physically safe environment - no spills, clutter or unnecessary equipment/Purposeful Proactive Rounding/Room/bathroom lighting operational, light cord in reach

## 2025-01-03 NOTE — CONSULT NOTE ADULT - SUBJECTIVE AND OBJECTIVE BOX
Chief complain/HPI  89 yr old femaleFrom home, ambulatory and independent, with hx of HTN, DM, HLD, CHF, CVA (> 15 years on plavix) presents c/o cough x 4 days and sob on day of admission.  In the emergency room she was started on BiPAP  Because of increased work of breathing and respiratory rate to 35, but did not tolerate it well. She was then transitioned to nasal cannula and is saturating well on 2 L.  She was also found to be in Afib with RVR to 193.  She was given Lopressor and her rate improve. She reports some shortness of breath and dizziness but is otherwise doing well.  She tested positive for influenza A.  She will be admitted for influenza requiring oxygen support.     ED Course    Vitals: BPmin 100/50; HRmax 193 (afib rvr) RRmax 35; O2 93% on RA  Labs: pH 7.29 (CO2 48); flu A pos; wbc 13; Na 127; K 3.3; BUN/Cr 27/1.26  Intervention: decadron, IV lopressor 5, bipap  Consults: none  Images: CXR No acute cardiopulmonary disease process. Cardiomegaly.        Allergies and Intolerances:        Allergies:  	No Known Allergies:     Home Medications:   * Patient Currently Takes Medications as of 17-Aug-2024 12:34 documented in Structured Notes  · 	acetaminophen 325 mg oral tablet: 2 tab(s) orally every 6 hours As needed Temp greater or equal to 38C (100.4F), Mild Pain (1 - 3)  · 	losartan 25 mg oral tablet: 1 tab(s) orally once a day  · 	aluminum hydroxide-magnesium hydroxide 200 mg-200 mg/5 mL oral suspension: 30 milliliter(s) orally every 4 hours As needed Dyspepsia  · 	lidocaine 4% topical film: Apply topically to affected area once a day on for 12 hours,  off for 12 hours  · 	polyethylene glycol 3350 oral powder for reconstitution: 17 gram(s) orally once a day  · 	senna leaf extract oral tablet: 2 tab(s) orally once a day (at bedtime)  · 	clopidogrel 75 mg oral tablet: 1 tab(s) orally once a day  · 	simvastatin 10 mg oral tablet: 1 tab(s) orally once a day (at bedtime)  · 	torsemide 20 mg oral tablet: 1 tab(s) orally once a day  · 	amLODIPine 5 mg oral tablet: 1 tab(s) orally once a day  · 	Jardiance 10 mg oral tablet: 1 tab(s) orally once a day  Patient History:   Social History:  · Substance use	No    Tobacco Screening:  · Core Measure Site	Yes  · Has the patient used tobacco in the past 30 days?	No    Risk Assessment:   Present on Admission:  Deep Venous Thrombosis	no  Pulmonary Embolus	no    HIV Screening:  · In accordance with [facility] law, we offer every patient who comes to our ED an HIV test. Would you like to be tested today?	Opt out    Hepatitis C Test Questions:  · In accordance with [facility] Law, we offer every patient a Hepatitis C test. Would you like to be tested today?	Opt out        PAST MEDICAL & SURGICAL HISTORY:  Diabetes      History of hypertension      Stroke      High cholesterol      Iron (Fe) deficiency anemia      Rectal mass      Congestive heart failure (CHF)          Home Medications Reviewed    Hospital Medications:   MEDICATIONS  (STANDING):  albuterol/ipratropium for Nebulization 3 milliLiter(s) Nebulizer every 6 hours  amLODIPine   Tablet 5 milliGRAM(s) Oral daily  apixaban 2.5 milliGRAM(s) Oral every 12 hours  atorvastatin 10 milliGRAM(s) Oral at bedtime  insulin lispro (ADMELOG) corrective regimen sliding scale   SubCutaneous three times a day before meals  insulin lispro (ADMELOG) corrective regimen sliding scale   SubCutaneous at bedtime  lactated ringers. 1000 milliLiter(s) (75 mL/Hr) IV Continuous <Continuous>  losartan 25 milliGRAM(s) Oral daily  metoprolol tartrate 12.5 milliGRAM(s) Oral two times a day  oseltamivir 30 milliGRAM(s) Oral daily  pantoprazole    Tablet 40 milliGRAM(s) Oral before breakfast  polyethylene glycol 3350 17 Gram(s) Oral daily  senna 2 Tablet(s) Oral at bedtime    MEDICATIONS  (PRN):  acetaminophen     Tablet .. 650 milliGRAM(s) Oral every 6 hours PRN Temp greater or equal to 38C (100.4F), Mild Pain (1 - 3)  aluminum hydroxide/magnesium hydroxide/simethicone Suspension 30 milliLiter(s) Oral every 4 hours PRN Dyspepsia  melatonin 3 milliGRAM(s) Oral at bedtime PRN Insomnia  ondansetron Injectable 4 milliGRAM(s) IV Push every 8 hours PRN Nausea and/or Vomiting      Allergies    No Known Allergies    Intolerances                              13.7   12.14 )-----------( 234      ( 03 Jan 2025 05:40 )             40.5     01-03    131[L]  |  97  |  33[H]  ----------------------------<  105[H]  3.5   |  21[L]  |  1.30    Ca    8.5      03 Jan 2025 05:40    TPro  8.2  /  Alb  3.7  /  TBili  0.7  /  DBili  x   /  AST  36  /  ALT  25  /  AlkPhos  239[H]  01-02      Urinalysis Basic - ( 03 Jan 2025 05:40 )    Color: x / Appearance: x / SG: x / pH: x  Gluc: 105 mg/dL / Ketone: x  / Bili: x / Urobili: x   Blood: x / Protein: x / Nitrite: x   Leuk Esterase: x / RBC: x / WBC x   Sq Epi: x / Non Sq Epi: x / Bacteria: x            RADIOLOGY & ADDITIONAL STUDIES:    SOCIAL HISTORY: Denies ETOh,Smoking,     FAMILY HISTORY:      REVIEW OF SYSTEMS:  CONSTITUTIONAL: No malaise, No fatigue, No fevers or chills, well developed, no diaphoresis  EYES/ENT: No visual changes;  No vertigo or throat pain   NECK: No pain or stiffness  RESPIRATORY: No cough, wheezing, hemoptysis; No shortness of breath  CARDIOVASCULAR: No chest pain or palpitations. No edema  GASTROINTESTINAL: No abdominal or epigastric pain. No nausea, vomiting, or hematemesis; No diarrhea or constipation. No melena or hematochezia.  GENITOURINARY: No dysuria, frequency, foamy urine, urinary urgency, incontinence or hematuria  NEUROLOGICAL: No numbness or weakness, No tremor  SKIN: No itching, burning, rashes, or lesions   VASCULAR: No claudication  Musculoskeletal: no arthralgia, no myalgia  All other review of systems is negative unless indicated above.    VITALS:  Vital Signs Last 24 Hrs  T(C): 36.8 (03 Jan 2025 07:30), Max: 37.3 (02 Jan 2025 16:12)  T(F): 98.2 (03 Jan 2025 07:30), Max: 99.1 (02 Jan 2025 16:12)  HR: 89 (03 Jan 2025 07:30) (67 - 189)  BP: 101/63 (03 Jan 2025 07:30) (100/50 - 142/75)  BP(mean): 95 (02 Jan 2025 18:36) (95 - 95)  RR: 20 (03 Jan 2025 07:38) (20 - 35)  SpO2: 88% (03 Jan 2025 07:38) (88% - 100%)    Parameters below as of 03 Jan 2025 07:30  Patient On (Oxygen Delivery Method): room air          Weight (kg): 49.895 (01-02 @ 16:12)    PHYSICAL EXAM:  Constitutional: NAD  HEENT: anicteric sclera, oropharynx clear, MMM  Neck: No JVD  Respiratory: good air entrance B/L, no wheezes, rales or rhonchi  Cardiovascular: S1, S2, RRR, no pericardial rub, no murmur  Gastrointestinal: BS+, soft, no tenderness, no distension, no bruit  Pelvis: bladder non-distended, no CVA tenderness  Extremities: No cyanosis or clubbing. No peripheral edema  Neurological: A/O x 3, no focal deficits  Psychiatric: Normal mood, normal affect  : No CVA tenderness. No veliz.   Skin: No rashes  Vascular: all pulses present  Access:                     Chief complain/HPI  89 yr old femaleFrom home, ambulatory and independent, with hx of HTN, DM, HLD, CHF, CVA (> 15 years on plavix) presents c/o cough x 4 days and sob on day of admission.  In the emergency room she was started on BiPAP  Because of increased work of breathing and respiratory rate to 35, but did not tolerate it well. She was then transitioned to nasal cannula and is saturating well on 2 L.  She was also found to be in Afib with RVR to 193.  She was given Lopressor and her rate improve. She reports some shortness of breath and dizziness but is otherwise doing well.  She tested positive for influenza A.    Renal consult was called for Hyponatremia and LYNN , baseline creatinine 0.75 on her last admission.     ED Course    Vitals: BPmin 100/50; HRmax 193 (afib rvr) RRmax 35; O2 93% on RA  Labs: pH 7.29 (CO2 48); flu A pos; wbc 13; Na 127; K 3.3; BUN/Cr 27/1.26  Intervention: decadron, IV lopressor 5, bipap  Consults: none  Images: CXR No acute cardiopulmonary disease process. Cardiomegaly.        Allergies and Intolerances:        Allergies:  	No Known Allergies:     Home Medications:   * Patient Currently Takes Medications as of 17-Aug-2024 12:34 documented in Structured Notes  · 	acetaminophen 325 mg oral tablet: 2 tab(s) orally every 6 hours As needed Temp greater or equal to 38C (100.4F), Mild Pain (1 - 3)  · 	losartan 25 mg oral tablet: 1 tab(s) orally once a day  · 	aluminum hydroxide-magnesium hydroxide 200 mg-200 mg/5 mL oral suspension: 30 milliliter(s) orally every 4 hours As needed Dyspepsia  · 	lidocaine 4% topical film: Apply topically to affected area once a day on for 12 hours,  off for 12 hours  · 	polyethylene glycol 3350 oral powder for reconstitution: 17 gram(s) orally once a day  · 	senna leaf extract oral tablet: 2 tab(s) orally once a day (at bedtime)  · 	clopidogrel 75 mg oral tablet: 1 tab(s) orally once a day  · 	simvastatin 10 mg oral tablet: 1 tab(s) orally once a day (at bedtime)  · 	torsemide 20 mg oral tablet: 1 tab(s) orally once a day  · 	amLODIPine 5 mg oral tablet: 1 tab(s) orally once a day  · 	Jardiance 10 mg oral tablet: 1 tab(s) orally once a day  Patient History:   Social History:  · Substance use	No    Tobacco Screening:  · Core Measure Site	Yes  · Has the patient used tobacco in the past 30 days?	No    Risk Assessment:   Present on Admission:  Deep Venous Thrombosis	no  Pulmonary Embolus	no    HIV Screening:  · In accordance with [facility] law, we offer every patient who comes to our ED an HIV test. Would you like to be tested today?	Opt out    Hepatitis C Test Questions:  · In accordance with [facility] Law, we offer every patient a Hepatitis C test. Would you like to be tested today?	Opt out        PAST MEDICAL & SURGICAL HISTORY:  Diabetes      History of hypertension      Stroke      High cholesterol      Iron (Fe) deficiency anemia      Rectal mass      Congestive heart failure (CHF)          Home Medications Reviewed    Hospital Medications:   MEDICATIONS  (STANDING):  albuterol/ipratropium for Nebulization 3 milliLiter(s) Nebulizer every 6 hours  amLODIPine   Tablet 5 milliGRAM(s) Oral daily  apixaban 2.5 milliGRAM(s) Oral every 12 hours  atorvastatin 10 milliGRAM(s) Oral at bedtime  insulin lispro (ADMELOG) corrective regimen sliding scale   SubCutaneous three times a day before meals  insulin lispro (ADMELOG) corrective regimen sliding scale   SubCutaneous at bedtime  lactated ringers. 1000 milliLiter(s) (75 mL/Hr) IV Continuous <Continuous>  losartan 25 milliGRAM(s) Oral daily  metoprolol tartrate 12.5 milliGRAM(s) Oral two times a day  oseltamivir 30 milliGRAM(s) Oral daily  pantoprazole    Tablet 40 milliGRAM(s) Oral before breakfast  polyethylene glycol 3350 17 Gram(s) Oral daily  senna 2 Tablet(s) Oral at bedtime    MEDICATIONS  (PRN):  acetaminophen     Tablet .. 650 milliGRAM(s) Oral every 6 hours PRN Temp greater or equal to 38C (100.4F), Mild Pain (1 - 3)  aluminum hydroxide/magnesium hydroxide/simethicone Suspension 30 milliLiter(s) Oral every 4 hours PRN Dyspepsia  melatonin 3 milliGRAM(s) Oral at bedtime PRN Insomnia  ondansetron Injectable 4 milliGRAM(s) IV Push every 8 hours PRN Nausea and/or Vomiting      Allergies    No Known Allergies    Intolerances                              13.7   12.14 )-----------( 234      ( 03 Jan 2025 05:40 )             40.5     01-03    131[L]  |  97  |  33[H]  ----------------------------<  105[H]  3.5   |  21[L]  |  1.30    Ca    8.5      03 Jan 2025 05:40    TPro  8.2  /  Alb  3.7  /  TBili  0.7  /  DBili  x   /  AST  36  /  ALT  25  /  AlkPhos  239[H]  01-02      Urinalysis Basic - ( 03 Jan 2025 05:40 )    Color: x / Appearance: x / SG: x / pH: x  Gluc: 105 mg/dL / Ketone: x  / Bili: x / Urobili: x   Blood: x / Protein: x / Nitrite: x   Leuk Esterase: x / RBC: x / WBC x   Sq Epi: x / Non Sq Epi: x / Bacteria: x            RADIOLOGY & ADDITIONAL STUDIES:    SOCIAL HISTORY: Denies ETOh,Smoking,     FAMILY HISTORY:      REVIEW OF SYSTEMS:  CONSTITUTIONAL: No malaise, No fatigue, No fevers or chills, well developed, no diaphoresis  EYES/ENT: No visual changes;  No vertigo or throat pain   NECK: No pain or stiffness  RESPIRATORY: No cough, wheezing, hemoptysis; No shortness of breath  CARDIOVASCULAR: No chest pain or palpitations. No edema  GASTROINTESTINAL: No abdominal or epigastric pain. No nausea, vomiting, or hematemesis; No diarrhea or constipation. No melena or hematochezia.  GENITOURINARY: No dysuria, frequency, foamy urine, urinary urgency, incontinence or hematuria  NEUROLOGICAL: No numbness or weakness, No tremor  SKIN: No itching, burning, rashes, or lesions   VASCULAR: No claudication  Musculoskeletal: no arthralgia, no myalgia  All other review of systems is negative unless indicated above.    VITALS:  Vital Signs Last 24 Hrs  T(C): 36.8 (03 Jan 2025 07:30), Max: 37.3 (02 Jan 2025 16:12)  T(F): 98.2 (03 Jan 2025 07:30), Max: 99.1 (02 Jan 2025 16:12)  HR: 89 (03 Jan 2025 07:30) (67 - 189)  BP: 101/63 (03 Jan 2025 07:30) (100/50 - 142/75) 119/63 HR 76.   BP(mean): 95 (02 Jan 2025 18:36) (95 - 95)  RR: 20 (03 Jan 2025 07:38) (20 - 35)  SpO2: 88% (03 Jan 2025 07:38) (88% - 100%)    Parameters below as of 03 Jan 2025 07:30  Patient On (Oxygen Delivery Method): room air          Weight (kg): 49.895 (01-02 @ 16:12)    PHYSICAL EXAM:  Constitutional: NAD, alert and awake  HEENT: anicteric sclera, oropharynx clear, MMM  Neck: No JVD  Respiratory:  reduced air entrance to base no wheezing and no ronchil .  Cardiovascular: S1, S2, RRR, no pericardial rub, no murmur  Gastrointestinal: BS+, soft, no tenderness, no distension, no bruit  Pelvis: bladder non-distended, no CVA tenderness  Extremities: No cyanosis or clubbing. No peripheral edema  Alert and awake oriented by 4.

## 2025-01-03 NOTE — PATIENT PROFILE ADULT - NSPROPASSIVESMOKEEXPOSURE_GEN_A_NUR
I spoke with mom at 1832 on Monday Jan 9 concerning baby with fever and decreased activity level. The v/d from the weekend have resolved. The temperature is 100.6, but baby feels warmer, and he lies uncharacteristically in mom's arms. I advised mom to cool baby in a tepid bath. I recommended a dose of Infant Motrin (on hand)  2.5 ml now, and alternate that with Tylenol 4.5 ml every 3 hours prn, or if desired, on a routine through the night. ER for hyperpyrexia. Appointment on Tuesday. I can see that mom has already made the appointment.   No

## 2025-01-03 NOTE — PROGRESS NOTE ADULT - SUBJECTIVE AND OBJECTIVE BOX
Date of Service 01-03-25 @ 08:24    CHIEF COMPLAINT:Patient is a 89y old  Female who presents with a chief complaint of influenza .Pt appears comfortable.    	  REVIEW OF SYSTEMS:    [ x] Unable to obtain    PHYSICAL EXAM:  T(C): 36.8 (01-03-25 @ 07:30), Max: 37.3 (01-02-25 @ 16:12)  HR: 89 (01-03-25 @ 07:30) (67 - 189)  BP: 101/63 (01-03-25 @ 07:30) (100/50 - 142/75)  RR: 20 (01-03-25 @ 07:38) (20 - 35)  SpO2: 88% (01-03-25 @ 07:38) (88% - 100%)  Wt(kg): --  I&O's Summary      Appearance: Normal	  HEENT:   Normal oral mucosa, PERRL, EOMI	  Lymphatic: No lymphadenopathy  Cardiovascular: Normal S1 S2, No JVD, No murmurs, No edema  Respiratory: Lungs clear to auscultation	  Gastrointestinal:  Soft, Non-tender, + BS	  Skin: No rashes, No ecchymoses, No cyanosis	  Neurologic: Non-focal  Extremities: Normal range of motion, No clubbing, cyanosis or edema  Vascular: Peripheral pulses palpable 2+ bilaterally    MEDICATIONS  (STANDING):  albuterol/ipratropium for Nebulization 3 milliLiter(s) Nebulizer every 6 hours  amLODIPine   Tablet 5 milliGRAM(s) Oral daily  atorvastatin 10 milliGRAM(s) Oral at bedtime  clopidogrel Tablet 75 milliGRAM(s) Oral daily  insulin lispro (ADMELOG) corrective regimen sliding scale   SubCutaneous three times a day before meals  insulin lispro (ADMELOG) corrective regimen sliding scale   SubCutaneous at bedtime  lactated ringers. 1000 milliLiter(s) (75 mL/Hr) IV Continuous <Continuous>  losartan 25 milliGRAM(s) Oral daily  oseltamivir 30 milliGRAM(s) Oral daily  polyethylene glycol 3350 17 Gram(s) Oral daily  senna 2 Tablet(s) Oral at bedtime  torsemide 20 milliGRAM(s) Oral daily      TELEMETRY:sinus with pac's 	      LABS:	 	      Troponin I, High Sensitivity Result: 33.7 ng/L (01-02 @ 17:12)                            13.7   12.14 )-----------( 234      ( 03 Jan 2025 05:40 )             40.5     01-03    131[L]  |  97  |  33[H]  ----------------------------<  105[H]  3.5   |  21[L]  |  1.30    Ca    8.5      03 Jan 2025 05:40    TPro  8.2  /  Alb  3.7  /  TBili  0.7  /  DBili  x   /  AST  36  /  ALT  25  /  AlkPhos  239[H]  01-02    < from: TTE W or WO Ultrasound Enhancing Agent (08.15.24 @ 14:57) >  CONCLUSIONS:      1. Left ventricular systolic function is normal with an ejection fraction of 56 % by Lynne's method of disks.   2. There is moderate (grade 2) left ventricular diastolic dysfunction.   3. Left atrium is moderately dilated.   4. There is moderate calcification of the mitral valve annulus.   5. Mild mitral regurgitation.   6. Trace aortic regurgitation.   7. Fibrocalcific aortic valve sclerosis without stenosis.   8. Mild to moderate tricuspid regurgitation.   9. No pericardial effusion seen.  10. No prior echocardiogram is available for comparison.    < end of copied text >

## 2025-01-03 NOTE — PROGRESS NOTE ADULT - ASSESSMENT
89 yr old female with hx of HTN, DM, HLD, CHF, CVA (> 15 years on plavix).  Pt is admitted for influenza requiring oxygen support and LYNN with hyponatremia.  Started Tamiflu. She was noted to be in A-fib with RVR on admission but became rate controlled after Lopressor was given.    She to be admitted to telemetry for monitoring and oxygen support. Cardiology and Nephrology following.

## 2025-01-03 NOTE — GOALS OF CARE CONVERSATION - ADVANCED CARE PLANNING - CONVERSATION DETAILS
Spoke in full detail regarding prior MOLST status as patient was a DNR/DNI last admission. Emergency contact Gerardo Tejada is legally blind and directed medical calls to his young brother Santi Tejada [phone]. Patient son Santi verbalize that his mother is a FULL Code, and that she is to get cardiac resuscitation in the event of cardiac and respiratory arrest. Patient son encouraged to verbalize concerns and emotional support given.

## 2025-01-03 NOTE — ED ADULT NURSE REASSESSMENT NOTE - NS ED NURSE REASSESS COMMENT FT1
Patient is resting comfortably in bed, arousable with tactile stimuli, during morning shift vital sign reassessment, patient was noted with low sp02. Lung sounds were auscultated showing ronchi during expiration. 2lpm n.c. o2 therapy implemented raising spo2 to 94%

## 2025-01-04 LAB
ANION GAP SERPL CALC-SCNC: 11 MMOL/L — SIGNIFICANT CHANGE UP (ref 5–17)
APPEARANCE UR: CLEAR — SIGNIFICANT CHANGE UP
BACTERIA # UR AUTO: ABNORMAL /HPF
BILIRUB UR-MCNC: NEGATIVE — SIGNIFICANT CHANGE UP
BUN SERPL-MCNC: 35 MG/DL — HIGH (ref 7–18)
CALCIUM SERPL-MCNC: 8.8 MG/DL — SIGNIFICANT CHANGE UP (ref 8.4–10.5)
CHLORIDE SERPL-SCNC: 98 MMOL/L — SIGNIFICANT CHANGE UP (ref 96–108)
CO2 SERPL-SCNC: 24 MMOL/L — SIGNIFICANT CHANGE UP (ref 22–31)
COLOR SPEC: YELLOW — SIGNIFICANT CHANGE UP
CREAT ?TM UR-MCNC: 43 MG/DL — SIGNIFICANT CHANGE UP
CREAT SERPL-MCNC: 0.92 MG/DL — SIGNIFICANT CHANGE UP (ref 0.5–1.3)
DIFF PNL FLD: ABNORMAL
EGFR: 58 ML/MIN/1.73M2 — LOW
EPI CELLS # UR: PRESENT
GLUCOSE BLDC GLUCOMTR-MCNC: 121 MG/DL — HIGH (ref 70–99)
GLUCOSE BLDC GLUCOMTR-MCNC: 144 MG/DL — HIGH (ref 70–99)
GLUCOSE BLDC GLUCOMTR-MCNC: 84 MG/DL — SIGNIFICANT CHANGE UP (ref 70–99)
GLUCOSE BLDC GLUCOMTR-MCNC: 91 MG/DL — SIGNIFICANT CHANGE UP (ref 70–99)
GLUCOSE SERPL-MCNC: 91 MG/DL — SIGNIFICANT CHANGE UP (ref 70–99)
GLUCOSE UR QL: 250 MG/DL
HCT VFR BLD CALC: 38.4 % — SIGNIFICANT CHANGE UP (ref 34.5–45)
HGB BLD-MCNC: 12.9 G/DL — SIGNIFICANT CHANGE UP (ref 11.5–15.5)
KETONES UR-MCNC: ABNORMAL MG/DL
LEUKOCYTE ESTERASE UR-ACNC: ABNORMAL
MAGNESIUM SERPL-MCNC: 2.2 MG/DL — SIGNIFICANT CHANGE UP (ref 1.6–2.6)
MCHC RBC-ENTMCNC: 29.1 PG — SIGNIFICANT CHANGE UP (ref 27–34)
MCHC RBC-ENTMCNC: 33.6 G/DL — SIGNIFICANT CHANGE UP (ref 32–36)
MCV RBC AUTO: 86.7 FL — SIGNIFICANT CHANGE UP (ref 80–100)
NITRITE UR-MCNC: NEGATIVE — SIGNIFICANT CHANGE UP
NRBC # BLD: 0 /100 WBCS — SIGNIFICANT CHANGE UP (ref 0–0)
OSMOLALITY UR: 415 MOS/KG — SIGNIFICANT CHANGE UP (ref 50–1200)
PH UR: 6 — SIGNIFICANT CHANGE UP (ref 5–8)
PHOSPHATE SERPL-MCNC: 3.2 MG/DL — SIGNIFICANT CHANGE UP (ref 2.5–4.5)
PLATELET # BLD AUTO: 240 K/UL — SIGNIFICANT CHANGE UP (ref 150–400)
POTASSIUM SERPL-MCNC: 3.9 MMOL/L — SIGNIFICANT CHANGE UP (ref 3.5–5.3)
POTASSIUM SERPL-SCNC: 3.9 MMOL/L — SIGNIFICANT CHANGE UP (ref 3.5–5.3)
POTASSIUM UR-SCNC: 12 MMOL/L — SIGNIFICANT CHANGE UP
PROT UR-MCNC: 100 MG/DL
RBC # BLD: 4.43 M/UL — SIGNIFICANT CHANGE UP (ref 3.8–5.2)
RBC # FLD: 13.2 % — SIGNIFICANT CHANGE UP (ref 10.3–14.5)
RBC CASTS # UR COMP ASSIST: 3 /HPF — SIGNIFICANT CHANGE UP (ref 0–4)
SODIUM SERPL-SCNC: 133 MMOL/L — LOW (ref 135–145)
SODIUM UR-SCNC: 42 MMOL/L — SIGNIFICANT CHANGE UP
SP GR SPEC: 1.02 — SIGNIFICANT CHANGE UP (ref 1–1.03)
UROBILINOGEN FLD QL: 1 MG/DL — SIGNIFICANT CHANGE UP (ref 0.2–1)
WBC # BLD: 10.71 K/UL — HIGH (ref 3.8–10.5)
WBC # FLD AUTO: 10.71 K/UL — HIGH (ref 3.8–10.5)
WBC UR QL: 4 /HPF — SIGNIFICANT CHANGE UP (ref 0–5)

## 2025-01-04 RX ORDER — OSELTAMIVIR 75 MG/1
30 CAPSULE ORAL
Refills: 0 | Status: COMPLETED | OUTPATIENT
Start: 2025-01-04 | End: 2025-01-09

## 2025-01-04 RX ORDER — SODIUM CHLORIDE 9 MG/ML
1000 INJECTION, SOLUTION INTRAMUSCULAR; INTRAVENOUS; SUBCUTANEOUS
Refills: 0 | Status: DISCONTINUED | OUTPATIENT
Start: 2025-01-04 | End: 2025-01-04

## 2025-01-04 RX ORDER — OSELTAMIVIR 75 MG/1
30 CAPSULE ORAL EVERY 12 HOURS
Refills: 0 | Status: DISCONTINUED | OUTPATIENT
Start: 2025-01-04 | End: 2025-01-04

## 2025-01-04 RX ADMIN — APIXABAN 2.5 MILLIGRAM(S): 5 TABLET, FILM COATED ORAL at 05:20

## 2025-01-04 RX ADMIN — LOSARTAN POTASSIUM 25 MILLIGRAM(S): 100 TABLET, FILM COATED ORAL at 05:20

## 2025-01-04 RX ADMIN — Medication 12.5 MILLIGRAM(S): at 05:20

## 2025-01-04 RX ADMIN — APIXABAN 2.5 MILLIGRAM(S): 5 TABLET, FILM COATED ORAL at 17:00

## 2025-01-04 RX ADMIN — DRONEDARONE 400 MILLIGRAM(S): 400 TABLET, FILM COATED ORAL at 05:19

## 2025-01-04 RX ADMIN — IPRATROPIUM BROMIDE AND ALBUTEROL SULFATE 3 MILLILITER(S): .5; 2.5 SOLUTION RESPIRATORY (INHALATION) at 09:29

## 2025-01-04 RX ADMIN — Medication 12.5 MILLIGRAM(S): at 17:00

## 2025-01-04 RX ADMIN — OSELTAMIVIR 30 MILLIGRAM(S): 75 CAPSULE ORAL at 14:52

## 2025-01-04 RX ADMIN — DRONEDARONE 400 MILLIGRAM(S): 400 TABLET, FILM COATED ORAL at 17:00

## 2025-01-04 RX ADMIN — SODIUM CHLORIDE 50 MILLILITER(S): 9 INJECTION, SOLUTION INTRAMUSCULAR; INTRAVENOUS; SUBCUTANEOUS at 14:53

## 2025-01-04 RX ADMIN — PANTOPRAZOLE 40 MILLIGRAM(S): 40 TABLET, DELAYED RELEASE ORAL at 06:00

## 2025-01-04 RX ADMIN — ATORVASTATIN CALCIUM 10 MILLIGRAM(S): 40 TABLET, FILM COATED ORAL at 21:26

## 2025-01-04 RX ADMIN — IPRATROPIUM BROMIDE AND ALBUTEROL SULFATE 3 MILLILITER(S): .5; 2.5 SOLUTION RESPIRATORY (INHALATION) at 16:11

## 2025-01-04 RX ADMIN — IPRATROPIUM BROMIDE AND ALBUTEROL SULFATE 3 MILLILITER(S): .5; 2.5 SOLUTION RESPIRATORY (INHALATION) at 20:26

## 2025-01-04 RX ADMIN — ACETAMINOPHEN 650 MILLIGRAM(S): 80 SOLUTION/ DROPS ORAL at 17:01

## 2025-01-04 RX ADMIN — ACETAMINOPHEN 650 MILLIGRAM(S): 80 SOLUTION/ DROPS ORAL at 17:33

## 2025-01-04 NOTE — PROGRESS NOTE ADULT - ASSESSMENT
89 yr old femaleFrom home, ambulatory and independent, with hx of HTN, DM, HLD, CHF, CVA (> 15 years on plavix) presents c/o cough x 4 days and sob on day of admission,Flu+,PAF with RVR,hyponatremia.  1.Flu-Tamiflu.  2.PAF-eliquis,low dose b blocker and multaq.Tele monitoring.  3.CVA-eliquis,statin.  4.Hyponatremia-IVF.  5.HTN- cozaar.  6.DM-Insulin.  7.Lipid d/o-statin.  8.PPI.  9.Check urine cx.

## 2025-01-04 NOTE — PROGRESS NOTE ADULT - ASSESSMENT
LYNN - improved , possible pre renal , AF with RVR on admission.  Acute bronchitis , positive to Influenza A improving . Increased Oseltamivir to BID as renal function improved. Yesterday dose was reduced to once a day follow Creatinine clearance calculation by pharmacy was 21 ml/min.   Hyponatremia improving.  Diarrhea,to follow , patient did not receive laxatives  as per documentation.

## 2025-01-04 NOTE — PROGRESS NOTE ADULT - SUBJECTIVE AND OBJECTIVE BOX
Date of Service 01-04-25 @ 10:38    CHIEF COMPLAINT:Patient is a 89y old  Female who presents with a chief complaint of influenza .Pt appears comfortable.    	  REVIEW OF SYSTEMS:  CONSTITUTIONAL: No fever, weight loss, or fatigue  EYES: No eye pain, visual disturbances, or discharge  ENT:  No difficulty hearing, tinnitus, vertigo; No sinus or throat pain  NECK: No pain or stiffness  RESPIRATORY: No cough, wheezing, chills or hemoptysis; No Shortness of Breath  CARDIOVASCULAR: No chest pain, palpitations, passing out, dizziness, or leg swelling  GASTROINTESTINAL: No abdominal or epigastric pain. No nausea, vomiting, or hematemesis; No diarrhea or constipation. No melena or hematochezia.  GENITOURINARY: No dysuria, frequency, hematuria, or incontinence  NEUROLOGICAL: No headaches, memory loss, loss of strength, numbness, or tremors  SKIN: No itching, burning, rashes, or lesions   LYMPH Nodes: No enlarged glands  ENDOCRINE: No heat or cold intolerance; No hair loss  MUSCULOSKELETAL: No joint pain or swelling; No muscle, back, or extremity pain  PSYCHIATRIC: No depression, anxiety, mood swings, or difficulty sleeping  HEME/LYMPH: No easy bruising, or bleeding gums  ALLERGY AND IMMUNOLOGIC: No hives or eczema	      PHYSICAL EXAM:  T(C): 36.3 (01-04-25 @ 05:14), Max: 36.6 (01-03-25 @ 11:05)  HR: 77 (01-04-25 @ 05:14) (74 - 77)  BP: 149/70 (01-04-25 @ 05:14) (119/63 - 149/70)  RR: 17 (01-04-25 @ 05:14) (17 - 18)  SpO2: 95% (01-04-25 @ 05:14) (95% - 100%)  Wt(kg): --  I&O's Summary    03 Jan 2025 07:01  -  04 Jan 2025 07:00  --------------------------------------------------------  IN: 0 mL / OUT: 300 mL / NET: -300 mL        Appearance: Normal	  HEENT:   Normal oral mucosa, PERRL, EOMI	  Lymphatic: No lymphadenopathy  Cardiovascular: Normal S1 S2, No JVD, No murmurs, No edema  Respiratory: Lungs clear to auscultation	  Psychiatry: A & O x 3, Mood & affect appropriate  Gastrointestinal:  Soft, Non-tender, + BS	  Skin: No rashes, No ecchymoses, No cyanosis	  Neurologic: Non-focal  Extremities: Normal range of motion, No clubbing, cyanosis or edema  Vascular: Peripheral pulses palpable 2+ bilaterally    MEDICATIONS  (STANDING):  albuterol/ipratropium for Nebulization 3 milliLiter(s) Nebulizer every 6 hours  apixaban 2.5 milliGRAM(s) Oral every 12 hours  atorvastatin 10 milliGRAM(s) Oral at bedtime  dronedarone 400 milliGRAM(s) Oral two times a day  influenza  Vaccine (HIGH DOSE) 0.5 milliLiter(s) IntraMuscular once  insulin lispro (ADMELOG) corrective regimen sliding scale   SubCutaneous three times a day before meals  insulin lispro (ADMELOG) corrective regimen sliding scale   SubCutaneous at bedtime  losartan 25 milliGRAM(s) Oral daily  metoprolol tartrate 12.5 milliGRAM(s) Oral two times a day  oseltamivir 30 milliGRAM(s) Oral every 12 hours  pantoprazole    Tablet 40 milliGRAM(s) Oral before breakfast  sodium chloride 0.9%. 1000 milliLiter(s) (50 mL/Hr) IV Continuous <Continuous>      TELEMETRY: nsr,pac's	      	  LABS:	 	        Troponin I, High Sensitivity Result: 33.7 ng/L (01-02 @ 17:12)                            12.9   10.71 )-----------( 240      ( 04 Jan 2025 06:08 )             38.4     01-04    133[L]  |  98  |  35[H]  ----------------------------<  91  3.9   |  24  |  0.92    Ca    8.8      04 Jan 2025 06:08  Phos  3.2     01-04  Mg     2.2     01-04    TPro  8.2  /  Alb  3.7  /  TBili  0.7  /  DBili  x   /  AST  36  /  ALT  25  /  AlkPhos  239[H]  01-02           Date of Service 01-04-25 @ 10:38    CHIEF COMPLAINT:Patient is a 89y old  Female who presents with a chief complaint of influenza .Pt appears comfortable.    	  REVIEW OF SYSTEMS:  CONSTITUTIONAL: No fever, weight loss, or fatigue  EYES: No eye pain, visual disturbances, or discharge  ENT:  No difficulty hearing, tinnitus, vertigo; No sinus or throat pain  NECK: No pain or stiffness  RESPIRATORY: No cough, wheezing, chills or hemoptysis; No Shortness of Breath  CARDIOVASCULAR: No chest pain, palpitations, passing out, dizziness, or leg swelling  GASTROINTESTINAL: No abdominal or epigastric pain. No nausea, vomiting, or hematemesis; No diarrhea or constipation. No melena or hematochezia.  GENITOURINARY: No dysuria, frequency, hematuria, or incontinence  NEUROLOGICAL: No headaches, memory loss, loss of strength, numbness, or tremors  SKIN: No itching, burning, rashes, or lesions   LYMPH Nodes: No enlarged glands  ENDOCRINE: No heat or cold intolerance; No hair loss  MUSCULOSKELETAL: No joint pain or swelling; No muscle, back, or extremity pain  PSYCHIATRIC: No depression, anxiety, mood swings, or difficulty sleeping  HEME/LYMPH: No easy bruising, or bleeding gums  ALLERGY AND IMMUNOLOGIC: No hives or eczema	      PHYSICAL EXAM:  T(C): 36.3 (01-04-25 @ 05:14), Max: 36.6 (01-03-25 @ 11:05)  HR: 77 (01-04-25 @ 05:14) (74 - 77)  BP: 149/70 (01-04-25 @ 05:14) (119/63 - 149/70)  RR: 17 (01-04-25 @ 05:14) (17 - 18)  SpO2: 95% (01-04-25 @ 05:14) (95% - 100%)  Wt(kg): --  I&O's Summary    03 Jan 2025 07:01  -  04 Jan 2025 07:00  --------------------------------------------------------  IN: 0 mL / OUT: 300 mL / NET: -300 mL        Appearance: Normal	  HEENT:   Normal oral mucosa, PERRL, EOMI	  Lymphatic: No lymphadenopathy  Cardiovascular: Normal S1 S2, No JVD, No murmurs, No edema  Respiratory: B/L [facility]  Psychiatry: A & O x 3, Mood & affect appropriate  Gastrointestinal:  Soft, Non-tender, + BS	  Skin: No rashes, No ecchymoses, No cyanosis	  Neurologic: Non-focal  Extremities: Normal range of motion, No clubbing, cyanosis or edema  Vascular: Peripheral pulses palpable 2+ bilaterally    MEDICATIONS  (STANDING):  albuterol/ipratropium for Nebulization 3 milliLiter(s) Nebulizer every 6 hours  apixaban 2.5 milliGRAM(s) Oral every 12 hours  atorvastatin 10 milliGRAM(s) Oral at bedtime  dronedarone 400 milliGRAM(s) Oral two times a day  influenza  Vaccine (HIGH DOSE) 0.5 milliLiter(s) IntraMuscular once  insulin lispro (ADMELOG) corrective regimen sliding scale   SubCutaneous three times a day before meals  insulin lispro (ADMELOG) corrective regimen sliding scale   SubCutaneous at bedtime  losartan 25 milliGRAM(s) Oral daily  metoprolol tartrate 12.5 milliGRAM(s) Oral two times a day  oseltamivir 30 milliGRAM(s) Oral every 12 hours  pantoprazole    Tablet 40 milliGRAM(s) Oral before breakfast  sodium chloride 0.9%. 1000 milliLiter(s) (50 mL/Hr) IV Continuous <Continuous>      TELEMETRY: nsr,pac's	      	  LABS:	 	        Troponin I, High Sensitivity Result: 33.7 ng/L (01-02 @ 17:12)                            12.9   10.71 )-----------( 240      ( 04 Jan 2025 06:08 )             38.4     01-04    133[L]  |  98  |  35[H]  ----------------------------<  91  3.9   |  24  |  0.92    Ca    8.8      04 Jan 2025 06:08  Phos  3.2     01-04  Mg     2.2     01-04    TPro  8.2  /  Alb  3.7  /  TBili  0.7  /  DBili  x   /  AST  36  /  ALT  25  /  AlkPhos  239[H]  01-02

## 2025-01-04 NOTE — PROGRESS NOTE ADULT - SUBJECTIVE AND OBJECTIVE BOX
Problem List:  LYNN  Hyponatremia    PAST MEDICAL & SURGICAL HISTORY:  Diabetes      History of hypertension      Stroke      High cholesterol      Iron (Fe) deficiency anemia      Rectal mass      Congestive heart failure (CHF)          No Known Allergies      MEDICATIONS  (STANDING):  albuterol/ipratropium for Nebulization 3 milliLiter(s) Nebulizer every 6 hours  apixaban 2.5 milliGRAM(s) Oral every 12 hours  atorvastatin 10 milliGRAM(s) Oral at bedtime  dronedarone 400 milliGRAM(s) Oral two times a day  influenza  Vaccine (HIGH DOSE) 0.5 milliLiter(s) IntraMuscular once  insulin lispro (ADMELOG) corrective regimen sliding scale   SubCutaneous three times a day before meals  insulin lispro (ADMELOG) corrective regimen sliding scale   SubCutaneous at bedtime  losartan 25 milliGRAM(s) Oral daily  metoprolol tartrate 12.5 milliGRAM(s) Oral two times a day  oseltamivir 30 milliGRAM(s) Oral two times a day  pantoprazole    Tablet 40 milliGRAM(s) Oral before breakfast  sodium chloride 0.9%. 1000 milliLiter(s) (50 mL/Hr) IV Continuous <Continuous>    MEDICATIONS  (PRN):  acetaminophen     Tablet .. 650 milliGRAM(s) Oral every 6 hours PRN Temp greater or equal to 38C (100.4F), Mild Pain (1 - 3)  melatonin 3 milliGRAM(s) Oral at bedtime PRN Insomnia  ondansetron Injectable 4 milliGRAM(s) IV Push every 8 hours PRN Nausea and/or Vomiting                            12.9   10.71 )-----------( 240      ( 04 Jan 2025 06:08 )             38.4     01-04    133[L]  |  98  |  35[H]  ----------------------------<  91  3.9   |  24  |  0.92    Ca    8.8      04 Jan 2025 06:08  Phos  3.2     01-04  Mg     2.2     01-04    TPro  8.2  /  Alb  3.7  /  TBili  0.7  /  DBili  x   /  AST  36  /  ALT  25  /  AlkPhos  239[H]  01-02        Creatinine, Random Urine: 43 mg/dL (01-04 @ 08:05)  Potassium, Random Urine: 12 mmol/L (01-04 @ 08:05)  Sodium, Random Urine: 42 mmol/L (01-04 @ 08:05)  Osmolality, Random Urine: 415 mos/kg (01-04 @ 08:05)      REVIEW OF SYSTEMS:  General: no fever no chills, no weight loss.  EYES/ENT: No visual changes;  No vertigo, no headache.  NECK: No pain or stiffness  RESPIRATORY: No cough, wheezing, hemoptysis; No shortness of breath  CARDIOVASCULAR: No chest pain or palpitations. No Edema  GASTROINTESTINAL: c/o continuos diarrhea in the last day. .  GENITOURINARY: No dysuria, frequency, foamy urine, urinary urgency, incontinence or hematuria  NEUROLOGICAL: No numbness or weakness, no tremor , no dizziness.   Muscle skeletal : no joint pain and no swelling of joints and limbs.  SKIN: No itching, burning, rashes.        VITALS:  T(F): 97.9 (01-04-25 @ 11:49), Max: 97.9 (01-04-25 @ 11:49)  HR: 63 (01-04-25 @ 11:49)  BP: 109/55 (01-04-25 @ 11:49)  RR: 19 (01-04-25 @ 11:49)  SpO2: 99% (01-04-25 @ 11:49)  Wt(kg): --    01-03 @ 07:01  -  01-04 @ 07:00  --------------------------------------------------------  IN: 0 mL / OUT: 300 mL / NET: -300 mL        PHYSICAL EXAM:  Constitutional: , no diaphoresis, no distress.  Neck: No JVD, no carotid bruit, supple, no adenopathy  Respiratory: bilateral ronchi,  in no respiratory distress.   Cardiovascular: S1, S2, RRR, no pericardial rub, no murmur  Abdomen: BS+, soft, no tenderness, no bruit  Pelvis: bladder nondistended  Extremities: No cyanosis or clubbing. No peripheral edema.

## 2025-01-04 NOTE — CONSULT NOTE ADULT - SUBJECTIVE AND OBJECTIVE BOX
Patient is a 89y old  Female who is From home, ambulatory and independent, with hx of HTN, DM, HLD, CHF, CVA (> 15 years on plavix) presents c/o cough x 4 days and sob on day of admission.  In the emergency room she was started on BiPAP  Because of increased work of breathing and respiratory rate to 35, but did not tolerate it well. She was then transitioned to nasal cannula and is saturating well on 2 L.  She was also found to be in Afib with RVR to 193.  She was given Lopressor and her rate improve. She also found to have positive Influenza A, started on Tamiflu. And the ID consult requested to assist with further management.      REVIEW OF SYSTEMS: Total of twelve systems have been reviewed with patient and found to be negative unless mentioned in HPI      PAST MEDICAL & SURGICAL HISTORY:  Diabetes  History of hypertension  Stroke  High cholesterol  Iron (Fe) deficiency anemia  Rectal mass  Congestive heart failure (CHF)        SOCIAL HISTORY  Alcohol: Does not drink  Tobacco: Does not smoke  Illicit substance use: None      FAMILY HISTORY: Non contributory to the present illness      ALLERGIES: No Known Allergies      Vital Signs Last 24 Hrs  T(C): 36.6 (2025 11:49), Max: 36.6 (2025 11:49)  T(F): 97.9 (2025 11:49), Max: 97.9 (2025 11:49)  HR: 63 (2025 11:49) (63 - 77)  BP: 109/55 (2025 11:49) (109/55 - 149/70)  BP(mean): --  RR: 19 (2025 11:49) (17 - 19)  SpO2: 99% (2025 11:49) (95% - 100%)    Parameters below as of 2025 11:49  Patient On (Oxygen Delivery Method): room air      PHYSICAL EXAM:  GENERAL: Not in distress   CHEST/LUNG:  Aire ntry bilaterally  HEART: s1 and s2 present  ABDOMEN:  Nontender and  Nondistended  EXTREMITIES: No pedal  edema  CNS: Awake and Alert      LABS:                        12.9   10.71 )-----------( 240      ( 2025 06:08 )             38.4       01-04    133[L]  |  98  |  35[H]  ----------------------------<  91  3.9   |  24  |  0.92    Ca    8.8      2025 06:08  Phos  3.2     -  Mg     2.2     -    TPro  8.2  /  Alb  3.7  /  TBili  0.7  /  DBili  x   /  AST  36  /  ALT  25  /  AlkPhos  239[H]          CAPILLARY BLOOD GLUCOSE  POCT Blood Glucose.: 91 mg/dL (2025 11:35)  POCT Blood Glucose.: 84 mg/dL (2025 07:59)  POCT Blood Glucose.: 110 mg/dL (2025 21:28)  POCT Blood Glucose.: 147 mg/dL (2025 18:25)  POCT Blood Glucose.: 175 mg/dL (2025 16:21)        Urinalysis Basic - ( 2025 08:05 )  Color: Yellow / Appearance: Clear / S.016 / pH: x  Gluc: x / Ketone: Trace mg/dL  / Bili: Negative / Urobili: 1.0 mg/dL   Blood: x / Protein: 100 mg/dL / Nitrite: Negative   Leuk Esterase: Moderate / RBC: 3 /HPF / WBC 4 /HPF   Sq Epi: x / Non Sq Epi: x / Bacteria: Few /HPF        MEDICATIONS  (STANDING):  albuterol/ipratropium for Nebulization 3 milliLiter(s) Nebulizer every 6 hours  apixaban 2.5 milliGRAM(s) Oral every 12 hours  atorvastatin 10 milliGRAM(s) Oral at bedtime  dronedarone 400 milliGRAM(s) Oral two times a day  influenza  Vaccine (HIGH DOSE) 0.5 milliLiter(s) IntraMuscular once  insulin lispro (ADMELOG) corrective regimen sliding scale   SubCutaneous three times a day before meals  insulin lispro (ADMELOG) corrective regimen sliding scale   SubCutaneous at bedtime  losartan 25 milliGRAM(s) Oral daily  metoprolol tartrate 12.5 milliGRAM(s) Oral two times a day  oseltamivir 30 milliGRAM(s) Oral every 12 hours  pantoprazole    Tablet 40 milliGRAM(s) Oral before breakfast  sodium chloride 0.9%. 1000 milliLiter(s) (50 mL/Hr) IV Continuous <Continuous>    MEDICATIONS  (PRN):  acetaminophen     Tablet .. 650 milliGRAM(s) Oral every 6 hours PRN Temp greater or equal to 38C (100.4F), Mild Pain (1 - 3)  melatonin 3 milliGRAM(s) Oral at bedtime PRN Insomnia  ondansetron Injectable 4 milliGRAM(s) IV Push every 8 hours PRN Nausea and/or Vomiting        RADIOLOGY & ADDITIONAL TESTS:    25: Xray Chest 1 View- PORTABLE-Urgent (25 @ 17:57) >    IMPRESSION: No acute cardiopulmonary disease process. Cardiomegaly.        MICROBIOLOGY DATA:    FluA/FluB/RSV/COVID PCR (25 @ 17:12)   SARS-CoV-2 Result: NotDete: This Respiratory Panel uses polymerase chain reaction (PCR) to detect for   influenza A; influenza B; and SARS-CoV-2.   This test was validated by [facility] and is in use under the FDA   Emergency Use Authorization (EUA) for clinical labs CLIA-certified to   perform high complexity testing. Test results should be correlated with   clinical presentation, patient history, and epidemiology.  Influenza A Result: Detected: notified dr annabel latham 2025 19:54:32 EST  Influenza B Result: NotDete  Resp Syn Virus Result: NotDetec

## 2025-01-05 LAB
ANION GAP SERPL CALC-SCNC: 8 MMOL/L — SIGNIFICANT CHANGE UP (ref 5–17)
BUN SERPL-MCNC: 41 MG/DL — HIGH (ref 7–18)
CALCIUM SERPL-MCNC: 8.2 MG/DL — LOW (ref 8.4–10.5)
CHLORIDE SERPL-SCNC: 100 MMOL/L — SIGNIFICANT CHANGE UP (ref 96–108)
CO2 SERPL-SCNC: 23 MMOL/L — SIGNIFICANT CHANGE UP (ref 22–31)
CREAT SERPL-MCNC: 1.04 MG/DL — SIGNIFICANT CHANGE UP (ref 0.5–1.3)
EGFR: 50 ML/MIN/1.73M2 — LOW
GLUCOSE BLDC GLUCOMTR-MCNC: 115 MG/DL — HIGH (ref 70–99)
GLUCOSE BLDC GLUCOMTR-MCNC: 129 MG/DL — HIGH (ref 70–99)
GLUCOSE BLDC GLUCOMTR-MCNC: 132 MG/DL — HIGH (ref 70–99)
GLUCOSE BLDC GLUCOMTR-MCNC: 81 MG/DL — SIGNIFICANT CHANGE UP (ref 70–99)
GLUCOSE SERPL-MCNC: 87 MG/DL — SIGNIFICANT CHANGE UP (ref 70–99)
HCT VFR BLD CALC: 33.9 % — LOW (ref 34.5–45)
HGB BLD-MCNC: 11.6 G/DL — SIGNIFICANT CHANGE UP (ref 11.5–15.5)
MAGNESIUM SERPL-MCNC: 2.1 MG/DL — SIGNIFICANT CHANGE UP (ref 1.6–2.6)
MCHC RBC-ENTMCNC: 30.2 PG — SIGNIFICANT CHANGE UP (ref 27–34)
MCHC RBC-ENTMCNC: 34.2 G/DL — SIGNIFICANT CHANGE UP (ref 32–36)
MCV RBC AUTO: 88.3 FL — SIGNIFICANT CHANGE UP (ref 80–100)
NRBC # BLD: 0 /100 WBCS — SIGNIFICANT CHANGE UP (ref 0–0)
PHOSPHATE SERPL-MCNC: 3 MG/DL — SIGNIFICANT CHANGE UP (ref 2.5–4.5)
PLATELET # BLD AUTO: 268 K/UL — SIGNIFICANT CHANGE UP (ref 150–400)
POTASSIUM SERPL-MCNC: 3.6 MMOL/L — SIGNIFICANT CHANGE UP (ref 3.5–5.3)
POTASSIUM SERPL-SCNC: 3.6 MMOL/L — SIGNIFICANT CHANGE UP (ref 3.5–5.3)
PROCALCITONIN SERPL-MCNC: 1.88 NG/ML — HIGH (ref 0.02–0.1)
RBC # BLD: 3.84 M/UL — SIGNIFICANT CHANGE UP (ref 3.8–5.2)
RBC # FLD: 13.2 % — SIGNIFICANT CHANGE UP (ref 10.3–14.5)
SODIUM SERPL-SCNC: 131 MMOL/L — LOW (ref 135–145)
WBC # BLD: 9.09 K/UL — SIGNIFICANT CHANGE UP (ref 3.8–10.5)
WBC # FLD AUTO: 9.09 K/UL — SIGNIFICANT CHANGE UP (ref 3.8–10.5)

## 2025-01-05 RX ORDER — SODIUM CHLORIDE 9 MG/ML
1000 INJECTION, SOLUTION INTRAVENOUS
Refills: 0 | Status: DISCONTINUED | OUTPATIENT
Start: 2025-01-05 | End: 2025-01-06

## 2025-01-05 RX ORDER — CEFTRIAXONE SODIUM 1 G/1
1000 INJECTION, POWDER, FOR SOLUTION INTRAMUSCULAR; INTRAVENOUS EVERY 24 HOURS
Refills: 0 | Status: DISCONTINUED | OUTPATIENT
Start: 2025-01-05 | End: 2025-01-07

## 2025-01-05 RX ADMIN — Medication 12.5 MILLIGRAM(S): at 05:40

## 2025-01-05 RX ADMIN — PANTOPRAZOLE 40 MILLIGRAM(S): 40 TABLET, DELAYED RELEASE ORAL at 05:40

## 2025-01-05 RX ADMIN — Medication 12.5 MILLIGRAM(S): at 17:24

## 2025-01-05 RX ADMIN — CEFTRIAXONE SODIUM 100 MILLIGRAM(S): 1 INJECTION, POWDER, FOR SOLUTION INTRAMUSCULAR; INTRAVENOUS at 22:25

## 2025-01-05 RX ADMIN — OSELTAMIVIR 30 MILLIGRAM(S): 75 CAPSULE ORAL at 05:40

## 2025-01-05 RX ADMIN — ATORVASTATIN CALCIUM 10 MILLIGRAM(S): 40 TABLET, FILM COATED ORAL at 21:14

## 2025-01-05 RX ADMIN — ACETAMINOPHEN 650 MILLIGRAM(S): 80 SOLUTION/ DROPS ORAL at 21:14

## 2025-01-05 RX ADMIN — IPRATROPIUM BROMIDE AND ALBUTEROL SULFATE 3 MILLILITER(S): .5; 2.5 SOLUTION RESPIRATORY (INHALATION) at 20:11

## 2025-01-05 RX ADMIN — IPRATROPIUM BROMIDE AND ALBUTEROL SULFATE 3 MILLILITER(S): .5; 2.5 SOLUTION RESPIRATORY (INHALATION) at 15:58

## 2025-01-05 RX ADMIN — OSELTAMIVIR 30 MILLIGRAM(S): 75 CAPSULE ORAL at 17:24

## 2025-01-05 RX ADMIN — DRONEDARONE 400 MILLIGRAM(S): 400 TABLET, FILM COATED ORAL at 05:40

## 2025-01-05 RX ADMIN — IPRATROPIUM BROMIDE AND ALBUTEROL SULFATE 3 MILLILITER(S): .5; 2.5 SOLUTION RESPIRATORY (INHALATION) at 03:04

## 2025-01-05 RX ADMIN — SODIUM CHLORIDE 50 MILLILITER(S): 9 INJECTION, SOLUTION INTRAVENOUS at 12:39

## 2025-01-05 RX ADMIN — APIXABAN 2.5 MILLIGRAM(S): 5 TABLET, FILM COATED ORAL at 05:40

## 2025-01-05 RX ADMIN — APIXABAN 2.5 MILLIGRAM(S): 5 TABLET, FILM COATED ORAL at 17:24

## 2025-01-05 RX ADMIN — ACETAMINOPHEN 650 MILLIGRAM(S): 80 SOLUTION/ DROPS ORAL at 22:14

## 2025-01-05 RX ADMIN — IPRATROPIUM BROMIDE AND ALBUTEROL SULFATE 3 MILLILITER(S): .5; 2.5 SOLUTION RESPIRATORY (INHALATION) at 09:01

## 2025-01-05 NOTE — PROGRESS NOTE ADULT - SUBJECTIVE AND OBJECTIVE BOX
Problem List:  LYNN , pre renal and AHRF, present EGFR 50ml/minute.          PAST MEDICAL & SURGICAL HISTORY:  Diabetes      History of hypertension      Stroke      High cholesterol      Iron (Fe) deficiency anemia      Rectal mass      Congestive heart failure (CHF)          No Known Allergies      MEDICATIONS  (STANDING):  albuterol/ipratropium for Nebulization 3 milliLiter(s) Nebulizer every 6 hours  apixaban 2.5 milliGRAM(s) Oral every 12 hours  atorvastatin 10 milliGRAM(s) Oral at bedtime  dronedarone 400 milliGRAM(s) Oral two times a day  influenza  Vaccine (HIGH DOSE) 0.5 milliLiter(s) IntraMuscular once  insulin lispro (ADMELOG) corrective regimen sliding scale   SubCutaneous three times a day before meals  insulin lispro (ADMELOG) corrective regimen sliding scale   SubCutaneous at bedtime  losartan 25 milliGRAM(s) Oral daily  metoprolol tartrate 12.5 milliGRAM(s) Oral two times a day  oseltamivir 30 milliGRAM(s) Oral two times a day  pantoprazole    Tablet 40 milliGRAM(s) Oral before breakfast    MEDICATIONS  (PRN):  acetaminophen     Tablet .. 650 milliGRAM(s) Oral every 6 hours PRN Temp greater or equal to 38C (100.4F), Mild Pain (1 - 3)  melatonin 3 milliGRAM(s) Oral at bedtime PRN Insomnia  ondansetron Injectable 4 milliGRAM(s) IV Push every 8 hours PRN Nausea and/or Vomiting                            11.6   9.09  )-----------( 268      ( 05 Jan 2025 06:50 )             33.9     01-05    131[L]  |  100  |  41[H]  ----------------------------<  87  3.6   |  23  |  1.04    Ca    8.2[L]      05 Jan 2025 06:50  Phos  3.0     01-05  Mg     2.1     01-05          Creatinine, Random Urine: 43 mg/dL (01-04 @ 08:05)  Potassium, Random Urine: 12 mmol/L (01-04 @ 08:05)  Sodium, Random Urine: 42 mmol/L (01-04 @ 08:05)  Osmolality, Random Urine: 415 mos/kg (01-04 @ 08:05)      REVIEW OF SYSTEMS:  General: no fever no chills, no weight loss.  EYES/ENT: No visual changes;  No vertigo, no headache.  NECK: No pain or stiffness  RESPIRATORY: No cough, wheezing, hemoptysis; No shortness of breath  CARDIOVASCULAR: No chest pain or palpitations. No Edema  GASTROINTESTINAL: No abdominal or epigastric pain. No nausea, vomiting. No diarrhea or constipation. No melena.  GENITOURINARY: No dysuria, frequency, foamy urine, urinary urgency, incontinence or hematuria  NEUROLOGICAL: No numbness or weakness, no tremor , no dizziness.   Muscle skeletal : no joint pain and no swelling of joints and limbs.  SKIN: No itching, burning, rashes.        VITALS:  T(F): 97.6 (01-05-25 @ 04:55), Max: 97.9 (01-04-25 @ 11:49)  HR: 69 (01-05-25 @ 04:55)  BP: 106/55 (01-05-25 @ 04:55)  RR: 18 (01-05-25 @ 04:55)  SpO2: 95% (01-05-25 @ 04:55)  Wt(kg): --      PHYSICAL EXAM:  Constitutional: well developed, no diaphoresis, no distress.  Neck: No JVD, no carotid bruit, supple, no adenopathy  Respiratory: Good air entrance B/L, no wheezes, rales or rhonchi  Cardiovascular: S1, S2, RRR, no pericardial rub, no murmur  Abdomen: BS+, soft, no tenderness, no bruit  Pelvis: bladder nondistended  Extremities: No cyanosis or clubbing. No peripheral edema.   Pulses: All present  Neurological: A/O x 3, no focal deficits  Psychiatric: Normal mood, normal affect  Skin: No rashes  Vascular Access:     Problem List:  LYNN , pre renal and AHRF, present EGFR 50ml/minute.  patient c/o profuse diarrhea for second day.           PAST MEDICAL & SURGICAL HISTORY:  Diabetes      History of hypertension      Stroke      High cholesterol      Iron (Fe) deficiency anemia      Rectal mass      Congestive heart failure (CHF)          No Known Allergies      MEDICATIONS  (STANDING):  albuterol/ipratropium for Nebulization 3 milliLiter(s) Nebulizer every 6 hours  apixaban 2.5 milliGRAM(s) Oral every 12 hours  atorvastatin 10 milliGRAM(s) Oral at bedtime  dronedarone 400 milliGRAM(s) Oral two times a day  influenza  Vaccine (HIGH DOSE) 0.5 milliLiter(s) IntraMuscular once  insulin lispro (ADMELOG) corrective regimen sliding scale   SubCutaneous three times a day before meals  insulin lispro (ADMELOG) corrective regimen sliding scale   SubCutaneous at bedtime  losartan 25 milliGRAM(s) Oral daily  metoprolol tartrate 12.5 milliGRAM(s) Oral two times a day  oseltamivir 30 milliGRAM(s) Oral two times a day  pantoprazole    Tablet 40 milliGRAM(s) Oral before breakfast    MEDICATIONS  (PRN):  acetaminophen     Tablet .. 650 milliGRAM(s) Oral every 6 hours PRN Temp greater or equal to 38C (100.4F), Mild Pain (1 - 3)  melatonin 3 milliGRAM(s) Oral at bedtime PRN Insomnia  ondansetron Injectable 4 milliGRAM(s) IV Push every 8 hours PRN Nausea and/or Vomiting                            11.6   9.09  )-----------( 268      ( 05 Jan 2025 06:50 )             33.9     01-05    131[L]  |  100  |  41[H]  ----------------------------<  87  3.6   |  23  |  1.04    Ca    8.2[L]      05 Jan 2025 06:50  Phos  3.0     01-05  Mg     2.1     01-05    eGFR: 50: The estimated glomerular filtration rate (eGFR) calculation is based on       Creatinine, Random Urine: 43 mg/dL (01-04 @ 08:05)  Potassium, Random Urine: 12 mmol/L (01-04 @ 08:05)  Sodium, Random Urine: 42 mmol/L (01-04 @ 08:05)  Osmolality, Random Urine: 415 mos/kg (01-04 @ 08:05)      REVIEW OF SYSTEMS:  General: no fever no chills, no weight loss.    RESPIRATORY: No cough, wheezing, hemoptysis; No shortness of breath  CARDIOVASCULAR: No chest pain or palpitations. No Edema  GASTROINTESTINAL: No abdominal or epigastric pain. No nausea, vomiting. c/o diarrhea.   GENITOURINARY: No dysuria, frequency, foamy urine, urinary urgency, incontinence or hematuria  NEUROLOGICAL: No numbness or weakness, no tremor , no dizziness.   Muscle skeletal : no joint pain and no swelling of joints and limbs.          VITALS:  T(F): 97.6 (01-05-25 @ 04:55), Max: 97.9 (01-04-25 @ 11:49)  HR: 69 (01-05-25 @ 04:55)  BP: 106/55 (01-05-25 @ 04:55)  RR: 18 (01-05-25 @ 04:55)  SpO2: 95% (01-05-25 @ 04:55)  Wt(kg): --      PHYSICAL EXAM:  Constitutional: well developed, no diaphoresis, no distress.  Neck: No JVD, no carotid bruit, supple, no adenopathy  Respiratory:  air entrance B/L, no wheezes, rales or rhonchi  Cardiovascular: S1, S2, RRR, no pericardial rub, no murmur  Abdomen: BS+, soft, no tenderness, no bruit  Pelvis: bladder nondistended  Extremities: No cyanosis or clubbing. No peripheral edema.

## 2025-01-05 NOTE — PROGRESS NOTE ADULT - SUBJECTIVE AND OBJECTIVE BOX
Date of Service 01-05-25 @ 10:43    CHIEF COMPLAINT:Patient is a 89y old  Female who presents with a chief complaint of influenza.Pt appears comfortable,+diarrhea.    	  REVIEW OF SYSTEMS:  CONSTITUTIONAL: No fever, weight loss, or fatigue  EYES: No eye pain, visual disturbances, or discharge  ENT:  No difficulty hearing, tinnitus, vertigo; No sinus or throat pain  NECK: No pain or stiffness  RESPIRATORY: No cough, wheezing, chills or hemoptysis; No Shortness of Breath  CARDIOVASCULAR: No chest pain, palpitations, passing out, dizziness, or leg swelling  GASTROINTESTINAL: No abdominal or epigastric pain. No nausea, vomiting, or hematemesis; + diarrhea no constipation. No melena or hematochezia.  GENITOURINARY: No dysuria, frequency, hematuria, or incontinence  NEUROLOGICAL: No headaches, memory loss, loss of strength, numbness, or tremors  SKIN: No itching, burning, rashes, or lesions   LYMPH Nodes: No enlarged glands  ENDOCRINE: No heat or cold intolerance; No hair loss  MUSCULOSKELETAL: No joint pain or swelling; No muscle, back, or extremity pain  PSYCHIATRIC: No depression, anxiety, mood swings, or difficulty sleeping  HEME/LYMPH: No easy bruising, or bleeding gums  ALLERGY AND IMMUNOLOGIC: No hives or eczema	        PHYSICAL EXAM:  T(C): 36.4 (01-05-25 @ 09:00), Max: 36.6 (01-04-25 @ 11:49)  HR: 59 (01-05-25 @ 09:00) (59 - 81)  BP: 108/52 (01-05-25 @ 09:00) (95/45 - 133/59)  RR: 20 (01-05-25 @ 09:00) (18 - 20)  SpO2: 100% (01-05-25 @ 09:00) (95% - 100%)  Wt(kg): --  I&O's Summary      Appearance: Normal	  HEENT:   Normal oral mucosa, PERRL, EOMI	  Lymphatic: No lymphadenopathy  Cardiovascular: Normal S1 S2, No JVD, No murmurs, No edema  Respiratory: Lungs clear to auscultation	  Psychiatry: A & O x 3, Mood & affect appropriate  Gastrointestinal:  Soft, Non-tender, + BS	  Skin: No rashes, No ecchymoses, No cyanosis	  Neurologic: Non-focal  Extremities: Normal range of motion, No clubbing, cyanosis or edema  Vascular: Peripheral pulses palpable 2+ bilaterally    MEDICATIONS  (STANDING):  albuterol/ipratropium for Nebulization 3 milliLiter(s) Nebulizer every 6 hours  apixaban 2.5 milliGRAM(s) Oral every 12 hours  atorvastatin 10 milliGRAM(s) Oral at bedtime  influenza  Vaccine (HIGH DOSE) 0.5 milliLiter(s) IntraMuscular once  insulin lispro (ADMELOG) corrective regimen sliding scale   SubCutaneous three times a day before meals  insulin lispro (ADMELOG) corrective regimen sliding scale   SubCutaneous at bedtime  metoprolol tartrate 12.5 milliGRAM(s) Oral two times a day  oseltamivir 30 milliGRAM(s) Oral two times a day      TELEMETRY: 	nsr    	  	  LABS:	 	    Troponin I, High Sensitivity Result: 33.7 ng/L (01-02 @ 17:12)                            11.6   9.09  )-----------( 268      ( 05 Jan 2025 06:50 )             33.9     01-05    131[L]  |  100  |  41[H]  ----------------------------<  87  3.6   |  23  |  1.04    Ca    8.2[L]      05 Jan 2025 06:50  Phos  3.0     01-05  Mg     2.1     01-05

## 2025-01-05 NOTE — CONSULT NOTE ADULT - SUBJECTIVE AND OBJECTIVE BOX
[  ] STAT REQUEST              [ X ] ROUTINE REQUEST    Patient is a 89 year old female with diarrhea. GI consulted to evaluate.        HPI:  89 year old female from home, ambulatory and independent, with past medical history significant for HTN, DM, CHF, CVA, Rectal mass, Iron deficiency anemia, Hyperlipidemia, presented to the emergency room with 4 days history of SOB and cough. Patient was admitted with influenza. During the hospital course patient developed watery non bloody diarrhea. No abdominal pain, nausea, vomiting, hematemesis, hematochezia, melena, fever, chills, chest pain, SOB, cough, hematuria, dysuria, recent antibiotics intake or travelling reported.              PAIN MANAGEMENT:  Pain Scale:                 0/10  Pain Location:         PAST MEDICAL HISTORY    DM    HTN    CVA    High cholesterol    Iron (Fe) deficiency anemia    Rectal mass    Congestive heart failure (CHF)    Diverticulosis    Jejunum Diverticulom             PAST SURGICAL HISTORY    No significant past surgical history reported        Allergies    No Known Allergies    Intolerances  None         MEDICATIONS  (STANDING):  albuterol/ipratropium for Nebulization 3 milliLiter(s) Nebulizer every 6 hours  apixaban 2.5 milliGRAM(s) Oral every 12 hours  atorvastatin 10 milliGRAM(s) Oral at bedtime  influenza  Vaccine (HIGH DOSE) 0.5 milliLiter(s) IntraMuscular once  insulin lispro (ADMELOG) corrective regimen sliding scale   SubCutaneous three times a day before meals  insulin lispro (ADMELOG) corrective regimen sliding scale   SubCutaneous at bedtime  lactated ringers 1000 milliLiter(s) (50 mL/Hr) IV Continuous <Continuous>  metoprolol tartrate 12.5 milliGRAM(s) Oral two times a day  oseltamivir 30 milliGRAM(s) Oral two times a day    MEDICATIONS  (PRN):  acetaminophen     Tablet .. 650 milliGRAM(s) Oral every 6 hours PRN Temp greater or equal to 38C (100.4F), Mild Pain (1 - 3)  melatonin 3 milliGRAM(s) Oral at bedtime PRN Insomnia  ondansetron Injectable 4 milliGRAM(s) IV Push every 8 hours PRN Nausea and/or Vomiting      SOCIAL HISTORY  Advanced Directives:       [ X ] Full Code       [  ] DNR  Marital Status:         [  ] M      [ X ] S      [  ] D       [  ] W  Children:       [ X ] Yes      [  ] No  Occupation:        [  ] Employed       [ X ] Unemployed       [  ] Retired  Diet:       [ X ] Regular       [  ] PEG feeding          [  ] NG tube feeding  Drug Use:           [ X ] Patient denied          [  ] Yes  Alcohol:           [ X ] No             [  ] Yes (socially)         [  ] Yes (chronic)  Tobacco:           [  ] Yes           [ X ] No      FAMILY HISTORY  [X  ] Heart Disease            [ X ] Diabetes             [X  ] HTN             [  ] Colon Cancer             [  ] Stomach Cancer              [  ] Pancreatic Cancer      VITAL SIGNS   Vital Signs Last 24 Hrs  T(C): 37 (01-05-25 @ 11:56), Max: 37 (01-05-25 @ 11:56)  T(F): 98.6 (01-05-25 @ 11:56), Max: 98.6 (01-05-25 @ 11:56)  HR: 55 (01-05-25 @ 11:56) (55 - 81)  BP: 95/46 (01-05-25 @ 11:56) (95/45 - 133/59)   RR: 18 (01-05-25 @ 11:56) (18 - 20)  SpO2: 100% (01-05-25 @ 11:56) (95% - 100%)          CBC Full  -  ( 05 Jan 2025 06:50 )  WBC Count : 9.09 K/uL  RBC Count : 3.84 M/uL  Hemoglobin : 11.6 g/dL  Hematocrit : 33.9 %  Platelet Count - Automated : 268 K/uL  Mean Cell Volume : 88.3 fl  Mean Cell Hemoglobin : 30.2 pg  Mean Cell Hemoglobin Concentration : 34.2 g/dL  Auto Neutrophil # : x  Auto Lymphocyte # : x  Auto Monocyte # : x  Auto Eosinophil # : x  Auto Basophil # : x  Auto Neutrophil % : x  Auto Lymphocyte % : x  Auto Monocyte % : x  Auto Eosinophil % : x  Auto Basophil % : x      01-05    131[L]  |  100  |  41[H]  ----------------------------<  87  3.6   |  23  |  1.04    Ca    8.2[L]      05 Jan 2025 06:50  Phos  3.0     01-05  Mg     2.1     01-05     Iron with Total Binding Capacity (03.03.22 @ 11:00)   Iron - Total Binding Capacity.: 374 ug/dL  % Saturation, Iron: 8 %  Iron Total, Serum: 29 ug/dL  Unsaturated Iron Binding Capacity: 345 ug/dL    Urinalysis + Microscopic Examination (01.04.25 @ 08:05)   pH Urine: 6.0  Urine Appearance: Clear  Color: Yellow  Specific Gravity: 1.016  Protein, Urine: 100 mg/dL  Glucose Qualitative, Urine: 250 mg/dL  Ketone - Urine: Trace mg/dL  Blood, Urine: Moderate  Bilirubin: Negative  Urobilinogen: 1.0 mg/dL  Leukocyte Esterase Concentration: Moderate  Nitrite: Negative  White Blood Cell - Urine: 4 /HPF  Red Blood Cell - Urine: 3 /HPF  Bacteria: Few /HPF  Squamous Epithelial Cells: Present    < from: 12 Lead ECG (08.13.24 @ 17:23) >    Ventricular Rate 81 BPM    Atrial Rate 81 BPM    P-R Interval 262 ms    QRS Duration 86 ms    Q-T Interval 396 ms    QTC Calculation(Bazett) 460 ms    P Axis 76 degrees    R Axis 47 degrees    T Axis 9 degrees    Diagnosis Line *** Poor data quality, interpretation may be adversely affected  Sinus rhythm with 1st degree A-V block  Otherwise normal ECG          RADIOLOGY/IMAGING       < from: Xray Chest 1 View- PORTABLE-Urgent (01.02.25 @ 17:57) >  ACC: [id number] EXAM:  XR CHEST PORTABLE URGENT 1V   ORDERED BY: MANDO PEARCE     PROCEDURE DATE:  01/02/2025          INTERPRETATION:  TECHNIQUE: Single portable view of the chest.    COMPARISON:  8/15/2024    CLINICAL HISTORY: Chest Pain    FINDINGS:    Single frontal view of the chest demonstrates the lungs to be clear. The   cardiomediastinal silhouette is enlarged. No acute osseous abnormalities.   Osteopenia.    IMPRESSION: No acute cardiopulmonary disease process. Cardiomegaly.         RADIOLOGY/IMAGING                  ACC: [id number] EXAM:  CT ABDOMEN AND PELVIS OC                          *** ADDENDUM***    Upon further review, there 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    --- End of Report ---    *** END OF ADDENDUM***      PROCEDURE DATE:  03/06/2022          INTERPRETATION:  CLINICAL INFORMATION: Low-grade small bowel obstruction    COMPARISON: 3/5/2022    CONTRAST/COMPLICATIONS:  IV Contrast: NONE  Oral Contrast: Omnipaque 300  Complications: None reported at time of study completion    PROCEDURE:  CT of the Abdomen and Pelvis was performed.  Sagittal and coronal reformats were performed.    FINDINGS:  LOWER CHEST: Mild bilateral pleural effusions, grossly unchanged on the   right and slightly decreased on the left. Mild bilateral atelectasis.    LIVER: Within normal limits.  BILE DUCTS: Normal caliber.  GALLBLADDER: Cholelithiasis. No evidence for thickened gallbladder wall   or pericholecystic fluid.  SPLEEN: Within normal limits.  PANCREAS: Within normal limits.  ADRENALS: Within normal limits.  KIDNEYS/URETERS: Stable hypodense lesions in the right kidney,   representing probable cysts. No evidence for a calculus in the kidneys or   ureters. No hydronephrosis.    BLADDER: Stable small air in the urinary bladder may be due to prior   Fernando catheter placement or cystitis. Clinical correlation is recommended.  REPRODUCTIVE ORGANS: Mild fullness of the posterior lower uterine segment   may be due to a fibroid. The adnexa appear grossly unremarkable.    BOWEL: No bowel obstruction. Colonic diverticulosis without evidence of   diverticulitis. Appendix not visualized. No secondary signs for acute   appendicitis. Possible mural thickening of the rectum may be due to   underdistention, proctitis, or rectal cancer. Clinical correlation is   recommended.  PERITONEUM: No free air or ascites.  VESSELS: Calcified atherosclerotic disease.  RETROPERITONEUM/LYMPH NODES: No lymphadenopathy.  ABDOMINAL WALL: Stable fat-containing ventral hernia.  BONES: Degenerative spondylosis. Grade 1 anterolisthesis at L4-5.    IMPRESSION:  No bowel obstruction. Colonic diverticulosis without evidence of   diverticulitis. Appendix not visualized. No secondary signs for acute   appendicitis. Possible mural thickening of the rectum may be due to   underdistention, proctitis, or rectal cancer. Clinical correlation is   recommended.    Stable small air in the urinary bladder may be due to prior Fernando   catheter placement or cystitis. Clinical correlation is recommended.    Mild bilateral pleural effusions.    A preliminary report was provided by DYLLAN NATION MD    --- End of Report ---    ***Please see the addendum at the top of this report. It may contain   additional important information or changes.****

## 2025-01-05 NOTE — CONSULT NOTE ADULT - NOSE
DATE OF PROCEDURE:  07/31/2019    SURGEON:  Omari Rutledge M.D.    SURGEON:  Omari Rutledge M.D.    ASSISTANT:  Aditya Burns DO.    PRE-OPERATIVE DIAGNOSIS: Carcinoma bilateral breasts  Deformity of reconstructed breasts,  unequal bilateral breasts, possible infection of left sinus tract.  History of radiation right breast    PRE-OPERATIVE DIAGNOSIS: Carcinoma bilateral breasts  Deformity of reconstructed breasts,  unequal bilateral breasts, possible infection of left sinus tract.  History of radiation right breast    PROCEDURE(S) PERFORMED:  Right breast reconstruction with removal of the 440 mL  silicone implant, insertion of the 620 mL [place] MH implant Ref [id number]  [id number]  Left breast, excision of sinus tract  due to fat necrosis to the left chest wall  inframammary line created about 20 cm long and 6 cm wide, revision of the left breast.     HISTORY:  This is a 48-year-old female, who was seen with a history of  bilateral carcinoma.  Mastectomy done 20 years ago.  The patient is diabetic  and has obesity and the patient underwent bilateral breast reconstruction  440 cc implant and latissimus dorsi flap  and 685 cc mentor MH  implant, left side.  Patient had radiation right breast before Postoperatively, the patient  has some deformity due readiated breast right and fat necrosis left.  The patient  had dehiscence of wound .  On the left side, she had fat necrosis,  and infection  is  controlled.  I discussed with the patient probably the excision of sinus tract  to prevent any further infection and right side probably need  implant bigger size to  equalize the left breast.  The procedure, complication, and alternatives were  explained to the patient.  The patient was taken to the surgery on 07/31/2019.     DESCRIPTION OF PROCEDURE:  Under general anesthesia, the patient was put on  operation table.  Bilateral chest wall was prepped and draped.  First, the left  side was prepared and  the right side was prepared separately.  Right breast  previous scar was incised superior latissmus dorsi muscle and chest wall.  Skin was  incised, deepened to the deeper fascia.  Entered to the pocket.  The implant was  removed.  440 mL [place] implant intact. Lateral pocket was loose and was  sutured and  then sizer was inserted, about 620 mL was adequate size compared to the left breast.  The  wound was irrigated thoroughly.  Hemostasis was obtained and the 620 mL [place]  MH implant Ref [id number] Sn [id number] and then wound was closed with 2-0 Vicryl  suture and 3-0 Vicryl suture.  Skin was closed with 4-0 nylon sutures.     Then  marking was done around the sinus tract to the left axillary fold to  inframammary line.  About 4-5 cm skin was incised including sinus tract.  Fat necrosis was  present, which was excised completely and sent to Pathology.Culture was done. Hemostasis  was obtained. Tissue were advanced to reconstruct inframammary line.  Th eopen wound was 4 cm wide and 20 cm long Revision of the left breast was done with #2-0 Vicryl  suture, 3-0 Vicryl suture, 3-0 V-Loc sutures.  All wounds were cleaned and  dressed firmly.  The patient tolerated the procedure very well.  Blood loss  during surgery about 25-50 mL, no complications.         DATE AND TIME Omari Rutledge M.D.      LETY/ABDIEL-#[id number]  DD:  07/31/2019 17:37:45      DT:  08/01/2019 01:44:26        [facility]                      MRN#: [id number]  ROOM:      [facility]#: [id number]  REPORT OF OPERATION    out 25?ý]PØ\"g   no discharge/no deviation

## 2025-01-05 NOTE — PROGRESS NOTE ADULT - ASSESSMENT
Patient is a 89y old  Female who is From home, ambulatory and independent, with hx of HTN, DM, HLD, CHF, CVA (> 15 years on plavix) presents c/o cough x 4 days and sob on day of admission.  In the emergency room she was started on BiPAP  Because of increased work of breathing and respiratory rate to 35, but did not tolerate it well. She was then transitioned to nasal cannula and is saturating well on 2 L.  She was also found to be in Afib with RVR to 193.  She was given Lopressor and her rate improve. She also found to have positive Influenza A, started on Tamiflu. And the ID consult requested to assist with further management.    # Sepsis ( Tachycardia + Leukocytosis + Hypoxia)  # Influenza A  # Elevated Procalcitonin level, 1.88    would recommend:    1. Follow up Blood cultures, are in process  2. Add Ceftriaxone in the setting of elevated procalcitonin level  3. Continue Tamiflu X 10 doses  4. Droplet Isolation  5. Supplemental oxygenation and Bronchodilator as needed  6. Consider CT chest to assess for infiltratation    d/w covering [place]      Attending Attestation:    Spent more than 45 minutes on total encounter, more than 50 % of the visit was spent counseling and/or coordinating care by the Attending physician.

## 2025-01-05 NOTE — PROGRESS NOTE ADULT - SUBJECTIVE AND OBJECTIVE BOX
Patient is seen and examined at the bed side, is afebrile. She is having cough and mild chest tightness.       REVIEW OF SYSTEMS: All other review systems are negative      ALLERGIES: No Known Allergies      Vital Signs Last 24 Hrs  T(C): 36.7 (2025 16:15), Max: 37 (2025 11:56)  T(F): 98.1 (2025 16:15), Max: 98.6 (2025 11:56)  HR: 59 (2025 16:15) (55 - 81)  BP: 122/49 (2025 16:15) (95/45 - 122/49)  BP(mean): --  RR: 16 (2025 16:15) (16 - 20)  SpO2: 100% (2025 16:15) (95% - 100%)    Parameters below as of 2025 16:15  Patient On (Oxygen Delivery Method): nasal cannula  O2 Flow (L/min): 3        PHYSICAL EXAM:  GENERAL: Not in distress , on oxygen via NC  CHEST/LUNG:  Air entry bilaterally  HEART: s1 and s2 present  ABDOMEN:  Nontender and  Nondistended  EXTREMITIES: No pedal  edema  CNS: Awake and Alert      LABS:                        11.6   9.09  )-----------( 268      ( 2025 06:50 )             33.9                           12.9   10.71 )-----------( 240      ( 2025 06:08 )             38.4         01-05    131[L]  |  100  |  41[H]  ----------------------------<  87  3.6   |  23  |  1.04    Ca    8.2[L]      2025 06:50  Phos  3.0     01-05  Mg     2.1     01-05      01-04    133[L]  |  98  |  35[H]  ----------------------------<  91  3.9   |  24  |  0.92    Ca    8.8      2025 06:08  Phos  3.2     01-04  Mg     2.2     -04    TPro  8.2  /  Alb  3.7  /  TBili  0.7  /  DBili  x   /  AST  36  /  ALT  25  /  AlkPhos  239[H]          CAPILLARY BLOOD GLUCOSE  POCT Blood Glucose.: 91 mg/dL (2025 11:35)  POCT Blood Glucose.: 84 mg/dL (2025 07:59)  POCT Blood Glucose.: 110 mg/dL (2025 21:28)  POCT Blood Glucose.: 147 mg/dL (2025 18:25)  POCT Blood Glucose.: 175 mg/dL (2025 16:21)        Urinalysis Basic - ( 2025 08:05 )  Color: Yellow / Appearance: Clear / S.016 / pH: x  Gluc: x / Ketone: Trace mg/dL  / Bili: Negative / Urobili: 1.0 mg/dL   Blood: x / Protein: 100 mg/dL / Nitrite: Negative   Leuk Esterase: Moderate / RBC: 3 /HPF / WBC 4 /HPF   Sq Epi: x / Non Sq Epi: x / Bacteria: Few /HPF        MEDICATIONS  (STANDING):    albuterol/ipratropium for Nebulization 3 milliLiter(s) Nebulizer every 6 hours  apixaban 2.5 milliGRAM(s) Oral every 12 hours  atorvastatin 10 milliGRAM(s) Oral at bedtime  influenza  Vaccine (HIGH DOSE) 0.5 milliLiter(s) IntraMuscular once  insulin lispro (ADMELOG) corrective regimen sliding scale   SubCutaneous three times a day before meals  insulin lispro (ADMELOG) corrective regimen sliding scale   SubCutaneous at bedtime  lactated ringers 1000 milliLiter(s) (50 mL/Hr) IV Continuous <Continuous>  metoprolol tartrate 12.5 milliGRAM(s) Oral two times a day  oseltamivir 30 milliGRAM(s) Oral two times a day        RADIOLOGY & ADDITIONAL TESTS:      25: Xray Chest 1 View- PORTABLE-Urgent (25 @ 17:57) >    IMPRESSION: No acute cardiopulmonary disease process. Cardiomegaly.        MICROBIOLOGY DATA:    Procalcitonin (25 @ 06:50)   Procalcitonin: 1.88    FluA/FluB/RSV/COVID PCR (25 @ 17:12)   SARS-CoV-2 Result: NotDetec: This Respiratory Panel uses polymerase chain reaction (PCR) to detect for   influenza A; influenza B; and SARS-CoV-2.   This test was validated by [facility] and is in use under the FDA   Emergency Use Authorization (EUA) for clinical labs CLIA-certified to   perform high complexity testing. Test results should be correlated with   clinical presentation, patient history, and epidemiology.  Influenza A Result: Detected: notified dr annabel latham 2025 19:54:32 EST  Influenza B Result: NotDetec  Resp Syn Virus Result: NotDetec

## 2025-01-05 NOTE — CONSULT NOTE ADULT - RESPIRATORY
no wheezes/no rales/no rhonchi/no respiratory distress/no use of accessory muscles/airway patent/breath sounds equal/good air movement

## 2025-01-05 NOTE — PROGRESS NOTE ADULT - ASSESSMENT
89 yr old femaleFrom home, ambulatory and independent, with hx of HTN, DM, HLD, CHF, CVA (> 15 years on plavix) presents c/o cough x 4 days and sob on day of admission,Flu+,PAF with RVR,hyponatremia.  1.Flu-Tamiflu.  2.PAF-eliquis,low dose b blocker ,d/c  multaq-can cause diarrhea,check stool cx.Tele monitoring.  3.CVA-eliquis,statin.  4.Hyponatremia-Renal f/u.  5.HTN- off cozaar due to soft bp.  6.DM-Insulin.  7.Lipid d/o-statin.  8.PPI.  9.Check urine cx.

## 2025-01-05 NOTE — PROGRESS NOTE ADULT - ASSESSMENT
LYNN - improved , possible pre renal , AF with RVR on admission. Suggest to restart IV fluids Lactate Ringer.   Acute bronchitis , positive to Influenza A improving . Increased Oseltamivir to BID as renal function improved.   Hyponatremia improving. Restart IV fluids as patient appetite reduced and diarrhea.   Diarrhea, ? drug reaction bacterial or viral infection, follow stool for cultures.

## 2025-01-05 NOTE — CONSULT NOTE ADULT - ASSESSMENT
Patient is a 89y old  Female who is From home, ambulatory and independent, with hx of HTN, DM, HLD, CHF, CVA (> 15 years on plavix) presents c/o cough x 4 days and sob on day of admission.  In the emergency room she was started on BiPAP  Because of increased work of breathing and respiratory rate to 35, but did not tolerate it well. She was then transitioned to nasal cannula and is saturating well on 2 L.  She was also found to be in Afib with RVR to 193.  She was given Lopressor and her rate improve. She also found to have positive Influenza A, started on Tamiflu. And the ID consult requested to assist with further management.    # Sepsis ( Tachycardia + Leukocytosis + Hypoxia)  # Influenza A    would recommend:    1. Procalcitonin level and Blood cultures X 2 in the setting of sepsis  2. Continue Tamiflu X 10 doses  3. Droplet Isolation  4. Supplemental oxygenation and Bronchodilator as needed    will follow the patient with you and make further recommendation based on the clinical course and Lab results  Thank you for the opportunity to participate in Ms. REID's care    Attending Attestation:    Spent more than 65 minutes on total encounter, more than 50 % of the visit was spent counseling and/or coordinating care by the Attending physician.    
  89 years old admitted with AHRF, positive to Influenza. Consult was called for LYNN and Hyponatremia .  ·	LYNN due to AHRF and AF with RVR and Hypotension with use of Losatan. Change IV fluids to NS 50 ml per hour.   ·	Hyponatremia , etiology ? diuretics and or SGLT2 inhibitors. Follow UA , urine sodium , K and osmolality.   ·	Influenza A positive , as per EGFR change Oseltamivir to 30 mg BID.       
1. Diarrhea (most likely viral)  2. R/o C. Diff  3. Jejunal diverticulum  4. Diverticulosis with out diverticulitis     Suggestions:    1. Check stool for culture  2. Check stool for C. Diff  3. Monitor electrolytes  4. Diet as tolerated  5. Protonix daily  6. Avoid NSAID  7. DVT prophylaxis

## 2025-01-06 LAB
ANION GAP SERPL CALC-SCNC: 9 MMOL/L — SIGNIFICANT CHANGE UP (ref 5–17)
BUN SERPL-MCNC: 32 MG/DL — HIGH (ref 7–18)
CALCIUM SERPL-MCNC: 8.3 MG/DL — LOW (ref 8.4–10.5)
CHLORIDE SERPL-SCNC: 104 MMOL/L — SIGNIFICANT CHANGE UP (ref 96–108)
CO2 SERPL-SCNC: 23 MMOL/L — SIGNIFICANT CHANGE UP (ref 22–31)
CREAT SERPL-MCNC: 0.82 MG/DL — SIGNIFICANT CHANGE UP (ref 0.5–1.3)
CULTURE RESULTS: SIGNIFICANT CHANGE UP
EGFR: 67 ML/MIN/1.73M2 — SIGNIFICANT CHANGE UP
GLUCOSE BLDC GLUCOMTR-MCNC: 102 MG/DL — HIGH (ref 70–99)
GLUCOSE BLDC GLUCOMTR-MCNC: 105 MG/DL — HIGH (ref 70–99)
GLUCOSE BLDC GLUCOMTR-MCNC: 119 MG/DL — HIGH (ref 70–99)
GLUCOSE BLDC GLUCOMTR-MCNC: 93 MG/DL — SIGNIFICANT CHANGE UP (ref 70–99)
GLUCOSE SERPL-MCNC: 104 MG/DL — HIGH (ref 70–99)
HCT VFR BLD CALC: 35.8 % — SIGNIFICANT CHANGE UP (ref 34.5–45)
HGB BLD-MCNC: 12.1 G/DL — SIGNIFICANT CHANGE UP (ref 11.5–15.5)
LEGIONELLA AG UR QL: NEGATIVE — SIGNIFICANT CHANGE UP
MAGNESIUM SERPL-MCNC: 2.2 MG/DL — SIGNIFICANT CHANGE UP (ref 1.6–2.6)
MCHC RBC-ENTMCNC: 30 PG — SIGNIFICANT CHANGE UP (ref 27–34)
MCHC RBC-ENTMCNC: 33.8 G/DL — SIGNIFICANT CHANGE UP (ref 32–36)
MCV RBC AUTO: 88.8 FL — SIGNIFICANT CHANGE UP (ref 80–100)
MRSA PCR RESULT.: SIGNIFICANT CHANGE UP
NRBC # BLD: 0 /100 WBCS — SIGNIFICANT CHANGE UP (ref 0–0)
PHOSPHATE SERPL-MCNC: 2.5 MG/DL — SIGNIFICANT CHANGE UP (ref 2.5–4.5)
PLATELET # BLD AUTO: 285 K/UL — SIGNIFICANT CHANGE UP (ref 150–400)
POTASSIUM SERPL-MCNC: 4.4 MMOL/L — SIGNIFICANT CHANGE UP (ref 3.5–5.3)
POTASSIUM SERPL-SCNC: 4.4 MMOL/L — SIGNIFICANT CHANGE UP (ref 3.5–5.3)
RBC # BLD: 4.03 M/UL — SIGNIFICANT CHANGE UP (ref 3.8–5.2)
RBC # FLD: 13.2 % — SIGNIFICANT CHANGE UP (ref 10.3–14.5)
S AUREUS DNA NOSE QL NAA+PROBE: SIGNIFICANT CHANGE UP
SODIUM SERPL-SCNC: 136 MMOL/L — SIGNIFICANT CHANGE UP (ref 135–145)
SPECIMEN SOURCE: SIGNIFICANT CHANGE UP
WBC # BLD: 11.34 K/UL — HIGH (ref 3.8–10.5)
WBC # FLD AUTO: 11.34 K/UL — HIGH (ref 3.8–10.5)

## 2025-01-06 PROCEDURE — 71250 CT THORAX DX C-: CPT | Mod: 26

## 2025-01-06 PROCEDURE — 74176 CT ABD & PELVIS W/O CONTRAST: CPT | Mod: 26

## 2025-01-06 RX ORDER — SALIVA SUBSTITUTE COMBO NO.3
15 AEROSOL, SPRAY WITH PUMP (ML) MUCOUS MEMBRANE EVERY 4 HOURS
Refills: 0 | Status: DISCONTINUED | OUTPATIENT
Start: 2025-01-06 | End: 2025-01-10

## 2025-01-06 RX ORDER — DIATRIZOATE MEGLUMINE, SODIUM 66 %-10 %
30 INFUSION BOTTLE (ML) INJECTION ONCE
Refills: 0 | Status: COMPLETED | OUTPATIENT
Start: 2025-01-06 | End: 2025-01-06

## 2025-01-06 RX ADMIN — ACETAMINOPHEN 650 MILLIGRAM(S): 80 SOLUTION/ DROPS ORAL at 22:32

## 2025-01-06 RX ADMIN — ACETAMINOPHEN 650 MILLIGRAM(S): 80 SOLUTION/ DROPS ORAL at 06:19

## 2025-01-06 RX ADMIN — ATORVASTATIN CALCIUM 10 MILLIGRAM(S): 40 TABLET, FILM COATED ORAL at 22:02

## 2025-01-06 RX ADMIN — IPRATROPIUM BROMIDE AND ALBUTEROL SULFATE 3 MILLILITER(S): .5; 2.5 SOLUTION RESPIRATORY (INHALATION) at 08:23

## 2025-01-06 RX ADMIN — OSELTAMIVIR 30 MILLIGRAM(S): 75 CAPSULE ORAL at 06:19

## 2025-01-06 RX ADMIN — Medication 12.5 MILLIGRAM(S): at 06:18

## 2025-01-06 RX ADMIN — APIXABAN 2.5 MILLIGRAM(S): 5 TABLET, FILM COATED ORAL at 18:31

## 2025-01-06 RX ADMIN — APIXABAN 2.5 MILLIGRAM(S): 5 TABLET, FILM COATED ORAL at 06:18

## 2025-01-06 RX ADMIN — ACETAMINOPHEN 650 MILLIGRAM(S): 80 SOLUTION/ DROPS ORAL at 06:49

## 2025-01-06 RX ADMIN — Medication 12.5 MILLIGRAM(S): at 18:30

## 2025-01-06 RX ADMIN — OSELTAMIVIR 30 MILLIGRAM(S): 75 CAPSULE ORAL at 18:31

## 2025-01-06 RX ADMIN — IPRATROPIUM BROMIDE AND ALBUTEROL SULFATE 3 MILLILITER(S): .5; 2.5 SOLUTION RESPIRATORY (INHALATION) at 20:16

## 2025-01-06 RX ADMIN — IPRATROPIUM BROMIDE AND ALBUTEROL SULFATE 3 MILLILITER(S): .5; 2.5 SOLUTION RESPIRATORY (INHALATION) at 03:31

## 2025-01-06 RX ADMIN — ACETAMINOPHEN 650 MILLIGRAM(S): 80 SOLUTION/ DROPS ORAL at 22:02

## 2025-01-06 RX ADMIN — IPRATROPIUM BROMIDE AND ALBUTEROL SULFATE 3 MILLILITER(S): .5; 2.5 SOLUTION RESPIRATORY (INHALATION) at 15:10

## 2025-01-06 RX ADMIN — CEFTRIAXONE SODIUM 100 MILLIGRAM(S): 1 INJECTION, POWDER, FOR SOLUTION INTRAMUSCULAR; INTRAVENOUS at 22:03

## 2025-01-06 NOTE — PROGRESS NOTE ADULT - SUBJECTIVE AND OBJECTIVE BOX
Patient is seen and examined at the bed side, is afebrile. She is feeling better, breathing better on oxygen via NC. the CT chest shows Multifocal pneumonia.      REVIEW OF SYSTEMS: All other review systems are negative      ALLERGIES: No Known Allergies      Vital Signs Last 24 Hrs  T(C): 36.4 (2025 20:09), Max: 37 (2025 08:17)  T(F): 97.6 (2025 20:09), Max: 98.6 (2025 08:17)  HR: 59 (2025 20:09) (58 - 67)  BP: 120/55 (2025 20:09) (112/62 - 141/49)  BP(mean): --  RR: 18 (2025 20:09) (18 - 20)  SpO2: 100% (2025 20:09) (98% - 100%)    Parameters below as of 2025 20:09  Patient On (Oxygen Delivery Method): nasal cannula  O2 Flow (L/min): 2        PHYSICAL EXAM:  GENERAL: Not in distress , on oxygen via NC  CHEST/LUNG:  Air entry bilaterally  HEART: s1 and s2 present  ABDOMEN:  Nontender and  Nondistended  EXTREMITIES: No pedal  edema  CNS: Awake and Alert      LABS:                        12.1   11.34 )-----------( 285      ( 2025 08:17 )             35.8                           11.6   9.09  )-----------( 268      ( 2025 06:50 )             33.9           01-06    136  |  104  |  32[H]  ----------------------------<  104[H]  4.4   |  23  |  0.82    Ca    8.3[L]      2025 08:17  Phos  2.5     01-06  Mg     2.2     01-06      01-05    131[L]  |  100  |  41[H]  ----------------------------<  87  3.6   |  23  |  1.04    Ca    8.2[L]      2025 06:50  Phos  3.0     01-05  Mg     2.1     01-05    TPro  8.2  /  Alb  3.7  /  TBili  0.7  /  DBili  x   /  AST  36  /  ALT  25  /  AlkPhos  239[H]          CAPILLARY BLOOD GLUCOSE  POCT Blood Glucose.: 91 mg/dL (2025 11:35)  POCT Blood Glucose.: 84 mg/dL (2025 07:59)  POCT Blood Glucose.: 110 mg/dL (2025 21:28)  POCT Blood Glucose.: 147 mg/dL (2025 18:25)  POCT Blood Glucose.: 175 mg/dL (2025 16:21)        Urinalysis Basic - ( 2025 08:05 )  Color: Yellow / Appearance: Clear / S.016 / pH: x  Gluc: x / Ketone: Trace mg/dL  / Bili: Negative / Urobili: 1.0 mg/dL   Blood: x / Protein: 100 mg/dL / Nitrite: Negative   Leuk Esterase: Moderate / RBC: 3 /HPF / WBC 4 /HPF   Sq Epi: x / Non Sq Epi: x / Bacteria: Few /HPF        MEDICATIONS  (STANDING):    albuterol/ipratropium for Nebulization 3 milliLiter(s) Nebulizer every 6 hours  apixaban 2.5 milliGRAM(s) Oral every 12 hours  atorvastatin 10 milliGRAM(s) Oral at bedtime  cefTRIAXone   IVPB 1000 milliGRAM(s) IV Intermittent every 24 hours  influenza  Vaccine (HIGH DOSE) 0.5 milliLiter(s) IntraMuscular once  insulin lispro (ADMELOG) corrective regimen sliding scale   SubCutaneous three times a day before meals  insulin lispro (ADMELOG) corrective regimen sliding scale   SubCutaneous at bedtime  metoprolol tartrate 12.5 milliGRAM(s) Oral two times a day  oseltamivir 30 milliGRAM(s) Oral two times a day      RADIOLOGY & ADDITIONAL TESTS:    25 : CT Chest No Cont (25 @ 13:49) >Multifocal pneumonia. Trace right pleural effusion.    No acute findings in the abdomen and pelvis.      25: Xray Chest 1 View- PORTABLE-Urgent (25 @ 17:57) >    IMPRESSION: No acute cardiopulmonary disease process. Cardiomegaly.        MICROBIOLOGY DATA:    Legionella pneumophila Antigen, Urine (25 @ 21:30)   Legionella Antigen, Urine: Negative:    MRSA/MSSA PCR (25 @ 21:30)   MRSA PCR Result.: NotDetec:    Culture - Blood in AM (25 @ 06:58)   Specimen Source: .Blood BLOOD  Culture Results: No growth at 24 hours    Culture - Blood in AM (25 @ 06:50)   Specimen Source: .Blood BLOOD  Culture Results: No growth at 24 hours    Procalcitonin (25 @ 06:50)   Procalcitonin: 1.88    FluA/FluB/RSV/COVID PCR (25 @ 17:12)   SARS-CoV-2 Result: NotDetec: This Respiratory Panel uses polymerase chain reaction (PCR) to detect for   influenza A; influenza B; and SARS-CoV-2.   This test was validated by [facility] and is in use under the FDA   Emergency Use Authorization (EUA) for clinical labs CLIA-certified to   perform high complexity testing. Test results should be correlated with   clinical presentation, patient history, and epidemiology.  Influenza A Result: Detected: notified dr annabel latham 2025 19:54:32 EST  Influenza B Result: NotDetec  Resp Syn Virus Result: NotDetec

## 2025-01-06 NOTE — PROGRESS NOTE ADULT - SUBJECTIVE AND OBJECTIVE BOX
[   ] ICU                                          [   ] CCU                                      [  X ] Medical Floor    Patient is a 89 year old female with diarrhea. GI consulted to evaluate.         89 year old female from home, ambulatory and independent, with past medical history significant for HTN, DM, CHF, CVA, Rectal mass, Iron deficiency anemia, Hyperlipidemia, presented to the emergency room with 4 days history of SOB and cough. Patient was admitted with influenza. During the hospital course patient developed watery non bloody diarrhea. No abdominal pain, nausea, vomiting, hematemesis, hematochezia, melena, fever, chills, chest pain, SOB, cough, hematuria, dysuria, recent antibiotics intake or travelling reported.       Patient appears comfortable. No new complaints reported, No abdominal pain, N/V, hematemesis, hematochezia, melena, fever, chills, chest pain, SOB, cough or diarrhea reported.           PAIN MANAGEMENT:  Pain Scale:                 0/10  Pain Location:         PAST MEDICAL HISTORY    DM    HTN    CVA    High cholesterol    Iron (Fe) deficiency anemia    Rectal mass    Congestive heart failure (CHF)    Diverticulosis    Jejunum Diverticulom             PAST SURGICAL HISTORY    No significant past surgical history reported        Allergies    No Known Allergies    Intolerances  None        SOCIAL HISTORY  Advanced Directives:       [ X ] Full Code       [  ] DNR  Marital Status:         [  ] M      [ X ] S      [  ] D       [  ] W  Children:       [ X ] Yes      [  ] No  Occupation:        [  ] Employed       [ X ] Unemployed       [  ] Retired  Diet:       [ X ] Regular       [  ] PEG feeding          [  ] NG tube feeding  Drug Use:           [ X ] Patient denied          [  ] Yes  Alcohol:           [ X ] No             [  ] Yes (socially)         [  ] Yes (chronic)  Tobacco:           [  ] Yes           [ X ] No      FAMILY HISTORY  [X  ] Heart Disease            [ X ] Diabetes             [X  ] HTN             [  ] Colon Cancer             [  ] Stomach Cancer              [  ] Pancreatic Cancer        VITALS   Vital Signs Last 24 Hrs  T(C): 36.4 (06 Jan 2025 11:43), Max: 37 (06 Jan 2025 08:17)  T(F): 97.5 (06 Jan 2025 11:43), Max: 98.6 (06 Jan 2025 08:17)  HR: 62 (06 Jan 2025 11:43) (58 - 63)  BP: 112/62 (06 Jan 2025 11:43) (112/62 - 141/49)   RR: 19 (06 Jan 2025 11:43) (16 - 20)  SpO2: 98% (06 Jan 2025 11:43) (98% - 100%)  Parameters below as of 06 Jan 2025 11:43  Patient On (Oxygen Delivery Method): nasal cannula  O2 Flow (L/min): 3      MEDICATIONS  (STANDING):  albuterol/ipratropium for Nebulization 3 milliLiter(s) Nebulizer every 6 hours  apixaban 2.5 milliGRAM(s) Oral every 12 hours  atorvastatin 10 milliGRAM(s) Oral at bedtime  cefTRIAXone   IVPB 1000 milliGRAM(s) IV Intermittent every 24 hours  influenza  Vaccine (HIGH DOSE) 0.5 milliLiter(s) IntraMuscular once  insulin lispro (ADMELOG) corrective regimen sliding scale   SubCutaneous three times a day before meals  insulin lispro (ADMELOG) corrective regimen sliding scale   SubCutaneous at bedtime  metoprolol tartrate 12.5 milliGRAM(s) Oral two times a day  oseltamivir 30 milliGRAM(s) Oral two times a day    MEDICATIONS  (PRN):  acetaminophen     Tablet .. 650 milliGRAM(s) Oral every 6 hours PRN Temp greater or equal to 38C (100.4F), Mild Pain (1 - 3)  Biotene Dry Mouth Oral Rinse 15 milliLiter(s) Swish and Spit every 4 hours PRN Mouth Care  melatonin 3 milliGRAM(s) Oral at bedtime PRN Insomnia  ondansetron Injectable 4 milliGRAM(s) IV Push every 8 hours PRN Nausea and/or Vomiting                            12.1   11.34 )-----------( 285      ( 06 Jan 2025 08:17 )             35.8       01-06    136  |  104  |  32[H]  ----------------------------<  104[H]  4.4   |  23  |  0.82    Ca    8.3[L]      06 Jan 2025 08:17  Phos  2.5     01-06  Mg     2.2     01-06

## 2025-01-06 NOTE — PROGRESS NOTE ADULT - ASSESSMENT
89 yr old femaleFrom home, ambulatory and independent, with hx of HTN, DM, HLD, CHF, CVA (> 15 years on plavix) presents c/o cough x 4 days and sob on day of admission,Flu+,PAF with RVR,hyponatremia.  1.Flu-Tamiflu.  2.PAF-eliquis,low dose b blocker ,off  multaq-can cause diarrhea,check stool cx.Tele monitoring.  3.CVA-eliquis,statin.  4.Hyponatremia-Renal f/u.  5.HTN- off cozaar due to soft bp.  6.DM-Insulin.  7.Lipid d/o-statin.  8.PPI.  9.ID f/u-added rocephin due to elevated procalcitonin,Check CT chest/abd/pelvis.

## 2025-01-06 NOTE — PROGRESS NOTE ADULT - SUBJECTIVE AND OBJECTIVE BOX
Date of Service 01-06-25 @ 09:51    CHIEF COMPLAINT:Patient is a 89y old  Female who presents with a chief complaint of influenza.Pt has had no further diarrhea since yesterday am.  	  REVIEW OF SYSTEMS:  CONSTITUTIONAL: No fever, weight loss, or fatigue  EYES: No eye pain, visual disturbances, or discharge  ENT:  No difficulty hearing, tinnitus, vertigo; No sinus or throat pain  NECK: No pain or stiffness  RESPIRATORY: No cough, wheezing, chills or hemoptysis; No Shortness of Breath  CARDIOVASCULAR: No chest pain, palpitations, passing out, dizziness, or leg swelling  GASTROINTESTINAL: No abdominal or epigastric pain. No nausea, vomiting, or hematemesis; No diarrhea or constipation. No melena or hematochezia.  GENITOURINARY: No dysuria, frequency, hematuria, or incontinence  NEUROLOGICAL: No headaches, memory loss, loss of strength, numbness, or tremors  SKIN: No itching, burning, rashes, or lesions   LYMPH Nodes: No enlarged glands  ENDOCRINE: No heat or cold intolerance; No hair loss  MUSCULOSKELETAL: No joint pain or swelling; No muscle, back, or extremity pain  PSYCHIATRIC: No depression, anxiety, mood swings, or difficulty sleeping  HEME/LYMPH: No easy bruising, or bleeding gums  ALLERGY AND IMMUNOLOGIC: No hives or eczema	        PHYSICAL EXAM:  T(C): 37 (01-06-25 @ 08:17), Max: 37 (01-05-25 @ 11:56)  HR: 63 (01-06-25 @ 08:17) (55 - 63)  BP: 141/49 (01-06-25 @ 08:17) (95/46 - 141/49)  RR: 20 (01-06-25 @ 08:17) (16 - 20)  SpO2: 100% (01-06-25 @ 08:17) (98% - 100%)  Wt(kg): --  I&O's Summary    05 Jan 2025 07:01  -  06 Jan 2025 07:00  --------------------------------------------------------  IN: 850 mL / OUT: 450 mL / NET: 400 mL        Appearance: Normal	  HEENT:   Normal oral mucosa, PERRL, EOMI	  Lymphatic: No lymphadenopathy  Cardiovascular: Normal S1 S2, No JVD, No murmurs, No edema  Respiratory: Lungs clear to auscultation	  Psychiatry: A & O x 3, Mood & affect appropriate  Gastrointestinal:  Soft, Non-tender, + BS	  Skin: No rashes, No ecchymoses, No cyanosis	  Neurologic: Non-focal  Extremities: Normal range of motion, No clubbing, cyanosis or edema  Vascular: Peripheral pulses palpable 2+ bilaterally    MEDICATIONS  (STANDING):  albuterol/ipratropium for Nebulization 3 milliLiter(s) Nebulizer every 6 hours  apixaban 2.5 milliGRAM(s) Oral every 12 hours  atorvastatin 10 milliGRAM(s) Oral at bedtime  cefTRIAXone   IVPB 1000 milliGRAM(s) IV Intermittent every 24 hours  diatrizoate meglumine/diatrizoate sodium. 30 milliLiter(s) Oral once  influenza  Vaccine (HIGH DOSE) 0.5 milliLiter(s) IntraMuscular once  insulin lispro (ADMELOG) corrective regimen sliding scale   SubCutaneous three times a day before meals  insulin lispro (ADMELOG) corrective regimen sliding scale   SubCutaneous at bedtime  metoprolol tartrate 12.5 milliGRAM(s) Oral two times a day  oseltamivir 30 milliGRAM(s) Oral two times a day      TELEMETRY: yoel donato	    	  	  LABS:	 	                        12.1   11.34 )-----------( 285      ( 06 Jan 2025 08:17 )             35.8     01-06    136  |  104  |  32[H]  ----------------------------<  104[H]  4.4   |  23  |  0.82    Ca    8.3[L]      06 Jan 2025 08:17  Phos  2.5     01-06  Mg     2.2     01-06      Procalcitonin: 1.88:

## 2025-01-06 NOTE — PROGRESS NOTE ADULT - ASSESSMENT
1. Diarrhea (improving)  2. R/o C. Diff  3. Jejunal diverticulum  4. Diverticulosis with out diverticulitis     Suggestions:    1. Diet as tolerated  2. Protonix 40mg daily  3. Monitor electrolytes  4. Avoid NSAID  5. DVT prophylaxis

## 2025-01-06 NOTE — PROGRESS NOTE ADULT - SUBJECTIVE AND OBJECTIVE BOX
NP Note discussed with  Primary Attending    Patient is a 89y old  Female who presents with a chief complaint of influenza (06 Jan 2025 13:59).  Seen and examined at bedside, appears comfortable.      INTERVAL HPI/OVERNIGHT EVENTS: no new complaints    MEDICATIONS  (STANDING):  albuterol/ipratropium for Nebulization 3 milliLiter(s) Nebulizer every 6 hours  apixaban 2.5 milliGRAM(s) Oral every 12 hours  atorvastatin 10 milliGRAM(s) Oral at bedtime  cefTRIAXone   IVPB 1000 milliGRAM(s) IV Intermittent every 24 hours  influenza  Vaccine (HIGH DOSE) 0.5 milliLiter(s) IntraMuscular once  insulin lispro (ADMELOG) corrective regimen sliding scale   SubCutaneous three times a day before meals  insulin lispro (ADMELOG) corrective regimen sliding scale   SubCutaneous at bedtime  metoprolol tartrate 12.5 milliGRAM(s) Oral two times a day  oseltamivir 30 milliGRAM(s) Oral two times a day    MEDICATIONS  (PRN):  acetaminophen     Tablet .. 650 milliGRAM(s) Oral every 6 hours PRN Temp greater or equal to 38C (100.4F), Mild Pain (1 - 3)  Biotene Dry Mouth Oral Rinse 15 milliLiter(s) Swish and Spit every 4 hours PRN Mouth Care  melatonin 3 milliGRAM(s) Oral at bedtime PRN Insomnia  ondansetron Injectable 4 milliGRAM(s) IV Push every 8 hours PRN Nausea and/or Vomiting      __________________________________________________  REVIEW OF SYSTEMS:    CONSTITUTIONAL: No fever,   EYES: no acute visual disturbances  NECK: No pain or stiffness  RESPIRATORY: No cough; No shortness of breath  CARDIOVASCULAR: No chest pain, no palpitations  GASTROINTESTINAL: No pain. No nausea or vomiting; No diarrhea   NEUROLOGICAL: No headache or numbness, no tremors  MUSCULOSKELETAL: No joint pain, no muscle pain  GENITOURINARY: no dysuria, no frequency, no hesitancy  PSYCHIATRY: no depression , no anxiety  ALL OTHER  ROS negative        Vital Signs Last 24 Hrs  T(C): 36.7 (06 Jan 2025 16:14), Max: 37 (06 Jan 2025 08:17)  T(F): 98.1 (06 Jan 2025 16:14), Max: 98.6 (06 Jan 2025 08:17)  HR: 67 (06 Jan 2025 16:14) (58 - 67)  BP: 132/50 (06 Jan 2025 16:14) (112/62 - 141/49)  BP(mean): --  RR: 18 (06 Jan 2025 16:14) (17 - 20)  SpO2: 99% (06 Jan 2025 16:14) (98% - 100%)    Parameters below as of 06 Jan 2025 16:14  Patient On (Oxygen Delivery Method): nasal cannula  O2 Flow (L/min): 3      ________________________________________________  PHYSICAL EXAM:  GENERAL: NAD  HEENT: Normocephalic;  conjunctivae and sclerae clear; moist mucous membranes;   NECK : supple  CHEST/LUNG: Clear to auscultation bilaterally with good air entry   HEART: S1 S2  regular; no murmurs, gallops or rubs  ABDOMEN: Soft, Nontender, Nondistended; Bowel sounds present  EXTREMITIES: no cyanosis; no edema; no calf tenderness  SKIN: warm and dry; no rash  NERVOUS SYSTEM:  Awake and alert; Oriented  to place, person and time ; no new deficits    _________________________________________________  LABS:                        12.1   11.34 )-----------( 285      ( 06 Jan 2025 08:17 )             35.8     01-06    136  |  104  |  32[H]  ----------------------------<  104[H]  4.4   |  23  |  0.82    Ca    8.3[L]      06 Jan 2025 08:17  Phos  2.5     01-06  Mg     2.2     01-06        Urinalysis Basic - ( 06 Jan 2025 08:17 )    Color: x / Appearance: x / SG: x / pH: x  Gluc: 104 mg/dL / Ketone: x  / Bili: x / Urobili: x   Blood: x / Protein: x / Nitrite: x   Leuk Esterase: x / RBC: x / WBC x   Sq Epi: x / Non Sq Epi: x / Bacteria: x      CAPILLARY BLOOD GLUCOSE      POCT Blood Glucose.: 102 mg/dL (06 Jan 2025 11:32)  POCT Blood Glucose.: 93 mg/dL (06 Jan 2025 07:36)  POCT Blood Glucose.: 132 mg/dL (05 Jan 2025 21:22)  POCT Blood Glucose.: 129 mg/dL (05 Jan 2025 16:59)    RADIOLOGY & ADDITIONAL TESTS:    < from: CT Abdomen and Pelvis No Cont (01.06.25 @ 13:50) >    ACC: [id number] EXAM:  CT ABDOMEN AND PELVIS   ORDERED BY: ZULEMA EWING     ACC: [id number] EXAM:  CT CHEST   ORDERED BY: SAGE SPRING     PROCEDURE DATE:  01/06/2025          INTERPRETATION:  CLINICAL INFORMATION: Sepsis, diarrhea, elevated   procalcitonin.    COMPARISON: CT abdomen pelvis 3/6/2022.    CONTRAST/COMPLICATIONS:  IV Contrast: NONE  Oral Contrast: NONE  .    PROCEDURE:  CT of the Chest, Abdomen and Pelvis was performed.  Sagittal and coronal reformats were performed.    FINDINGS:    Evaluation of the solid organs and vasculature is limited in the absence   of intravenous contrast.  CHEST:  LUNGS AND LARGE AIRWAYS: Patent central airways. Patchy areas of   consolidation are noted in the right middle and lower lobes, suggesting   multifocal pneumonia. Dependent opacity in the left lung base favors   atelectasis. Scattered small calcified granulomata.  PLEURA: Trace right pleural effusion.  VESSELS: Within normal limits.  HEART: Cardiomegaly. No pericardial effusion. Aortic and coronary artery   calcifications.  MEDIASTINUM AND SAMSON: No lymphadenopathy. Nonenlarged mediastinal lymph   nodes with calcifications are noted.  CHEST WALL AND LOWER NECK: Within normal limits.    ABDOMEN AND PELVIS:  LIVER: Within normal limits.  BILE DUCTS: Normal caliber.  GALLBLADDER: Cholelithiasis.  SPLEEN: Within normal limits.  PANCREAS: Within normal limits.  ADRENALS: Within normal limits.  KIDNEYS/URETERS: Right renal cysts. No renal calculi or hydronephrosis.    BLADDER: Within normal limits.  REPRODUCTIVE ORGANS: Uterus and adnexa within normal limits.    BOWEL: No bowel obstruction. Appendix is not visualized. No evidence of   inflammation in the pericecal region.  PERITONEUM/RETROPERITONEUM: No ascites or pneumoperitoneum.  VESSELS: Atherosclerotic changes.  LYMPH NODES: No lymphadenopathy.  ABDOMINAL WALL: Rectus diastasis and small fat-containing periumbilical   hernia.  BONES: Degenerative changes.    IMPRESSION:  Multifocal pneumonia. Trace right pleural effusion.    No acute findings in the abdomen and pelvis.        --- End of Report ---    < end of copied text >    < from: Xray Chest 1 View- PORTABLE-Urgent (01.02.25 @ 17:57) >    ACC: [id number] EXAM:  XR CHEST PORTABLE URGENT 1V   ORDERED BY: MANDO PEARCE     PROCEDURE DATE:  01/02/2025          INTERPRETATION:  TECHNIQUE: Single portable view of the chest.    COMPARISON:  8/15/2024    CLINICAL HISTORY: Chest Pain    FINDINGS:    Single frontal view of the chest demonstrates the lungs to be clear. The   cardiomediastinal silhouette is enlarged. No acute osseous abnormalities.   Osteopenia.    IMPRESSION: No acute cardiopulmonary disease process. Cardiomegaly.    < end of copied text >        Imaging Personally Reviewed:  YES/NO    Consultant(s) Notes Reviewed:   YES/ No    Care Discussed with Consultants :     Plan of care was discussed with patient and /or primary care giver; all questions and concerns were addressed and care was aligned with patient's wishes.

## 2025-01-06 NOTE — PROGRESS NOTE ADULT - ASSESSMENT
89 yr old female with hx of HTN, DM, HLD, CHF, CVA (> 15 years on plavix).  Pt is admitted for influenza requiring oxygen support and LYNN with hyponatremia.  Started Tamiflu. She was noted to be in A-fib with RVR on admission but became rate controlled after Lopressor was given.    She to be admitted to telemetry for monitoring and oxygen support. Cardiology and Nephrology following.   Pt. followed by ID Dr. Tovar, treated with Tamiflu and Ceftriaxone.

## 2025-01-06 NOTE — PROGRESS NOTE ADULT - ASSESSMENT
Patient is a 89y old  Female who is From home, ambulatory and independent, with hx of HTN, DM, HLD, CHF, CVA (> 15 years on plavix) presents c/o cough x 4 days and sob on day of admission.  In the emergency room she was started on BiPAP  Because of increased work of breathing and respiratory rate to 35, but did not tolerate it well. She was then transitioned to nasal cannula and is saturating well on 2 L.  She was also found to be in Afib with RVR to 193.  She was given Lopressor and her rate improve. She also found to have positive Influenza A, started on Tamiflu. And the ID consult requested to assist with further management.    # Sepsis ( Tachycardia + Leukocytosis + Hypoxia)  # Influenza A  # Multifocal pneumonia- most likely superimposed-  Elevated Procalcitonin level, 1.88 - MRSA PCR and Legionella urine Ag is negative    would recommend:    1. Continue Ceftriaxone to cover multifocal pneumonia   2. Continue Tamiflu X 10 doses  3. Droplet Isolation  4. Aspiration precaution  5. Supplemental oxygenation and Bronchodilator as needed    Attending Attestation:    Spent more than 35 minutes on total encounter, more than 50 % of the visit was spent counseling and/or coordinating care by the Attending physician.

## 2025-01-07 LAB
ANION GAP SERPL CALC-SCNC: 7 MMOL/L — SIGNIFICANT CHANGE UP (ref 5–17)
BUN SERPL-MCNC: 23 MG/DL — HIGH (ref 7–18)
CALCIUM SERPL-MCNC: 8.7 MG/DL — SIGNIFICANT CHANGE UP (ref 8.4–10.5)
CHLORIDE SERPL-SCNC: 103 MMOL/L — SIGNIFICANT CHANGE UP (ref 96–108)
CO2 SERPL-SCNC: 24 MMOL/L — SIGNIFICANT CHANGE UP (ref 22–31)
CREAT SERPL-MCNC: 0.71 MG/DL — SIGNIFICANT CHANGE UP (ref 0.5–1.3)
EGFR: 80 ML/MIN/1.73M2 — SIGNIFICANT CHANGE UP
GLUCOSE BLDC GLUCOMTR-MCNC: 109 MG/DL — HIGH (ref 70–99)
GLUCOSE BLDC GLUCOMTR-MCNC: 149 MG/DL — HIGH (ref 70–99)
GLUCOSE BLDC GLUCOMTR-MCNC: 175 MG/DL — HIGH (ref 70–99)
GLUCOSE BLDC GLUCOMTR-MCNC: 79 MG/DL — SIGNIFICANT CHANGE UP (ref 70–99)
GLUCOSE SERPL-MCNC: 98 MG/DL — SIGNIFICANT CHANGE UP (ref 70–99)
HCT VFR BLD CALC: 37.1 % — SIGNIFICANT CHANGE UP (ref 34.5–45)
HGB BLD-MCNC: 12.3 G/DL — SIGNIFICANT CHANGE UP (ref 11.5–15.5)
MCHC RBC-ENTMCNC: 29.9 PG — SIGNIFICANT CHANGE UP (ref 27–34)
MCHC RBC-ENTMCNC: 33.2 G/DL — SIGNIFICANT CHANGE UP (ref 32–36)
MCV RBC AUTO: 90 FL — SIGNIFICANT CHANGE UP (ref 80–100)
NRBC # BLD: 0 /100 WBCS — SIGNIFICANT CHANGE UP (ref 0–0)
PLATELET # BLD AUTO: 334 K/UL — SIGNIFICANT CHANGE UP (ref 150–400)
POTASSIUM SERPL-MCNC: 4.3 MMOL/L — SIGNIFICANT CHANGE UP (ref 3.5–5.3)
POTASSIUM SERPL-SCNC: 4.3 MMOL/L — SIGNIFICANT CHANGE UP (ref 3.5–5.3)
RBC # BLD: 4.12 M/UL — SIGNIFICANT CHANGE UP (ref 3.8–5.2)
RBC # FLD: 13.3 % — SIGNIFICANT CHANGE UP (ref 10.3–14.5)
SODIUM SERPL-SCNC: 134 MMOL/L — LOW (ref 135–145)
WBC # BLD: 10.75 K/UL — HIGH (ref 3.8–10.5)
WBC # FLD AUTO: 10.75 K/UL — HIGH (ref 3.8–10.5)

## 2025-01-07 RX ORDER — METHYLPREDNISOLONE 4 MG/1
40 TABLET ORAL EVERY 8 HOURS
Refills: 0 | Status: DISCONTINUED | OUTPATIENT
Start: 2025-01-07 | End: 2025-01-09

## 2025-01-07 RX ADMIN — IPRATROPIUM BROMIDE AND ALBUTEROL SULFATE 3 MILLILITER(S): .5; 2.5 SOLUTION RESPIRATORY (INHALATION) at 08:24

## 2025-01-07 RX ADMIN — IPRATROPIUM BROMIDE AND ALBUTEROL SULFATE 3 MILLILITER(S): .5; 2.5 SOLUTION RESPIRATORY (INHALATION) at 20:01

## 2025-01-07 RX ADMIN — Medication 100 MILLIGRAM(S): at 19:55

## 2025-01-07 RX ADMIN — OSELTAMIVIR 30 MILLIGRAM(S): 75 CAPSULE ORAL at 06:55

## 2025-01-07 RX ADMIN — METHYLPREDNISOLONE 40 MILLIGRAM(S): 4 TABLET ORAL at 13:48

## 2025-01-07 RX ADMIN — Medication 12.5 MILLIGRAM(S): at 17:24

## 2025-01-07 RX ADMIN — IPRATROPIUM BROMIDE AND ALBUTEROL SULFATE 3 MILLILITER(S): .5; 2.5 SOLUTION RESPIRATORY (INHALATION) at 14:27

## 2025-01-07 RX ADMIN — APIXABAN 2.5 MILLIGRAM(S): 5 TABLET, FILM COATED ORAL at 17:24

## 2025-01-07 RX ADMIN — METHYLPREDNISOLONE 40 MILLIGRAM(S): 4 TABLET ORAL at 20:30

## 2025-01-07 RX ADMIN — ATORVASTATIN CALCIUM 10 MILLIGRAM(S): 40 TABLET, FILM COATED ORAL at 20:30

## 2025-01-07 RX ADMIN — OSELTAMIVIR 30 MILLIGRAM(S): 75 CAPSULE ORAL at 18:00

## 2025-01-07 RX ADMIN — APIXABAN 2.5 MILLIGRAM(S): 5 TABLET, FILM COATED ORAL at 06:55

## 2025-01-07 RX ADMIN — Medication 12.5 MILLIGRAM(S): at 06:56

## 2025-01-07 NOTE — PROGRESS NOTE ADULT - ASSESSMENT
Patient is a 89y old  Female who is From home, ambulatory and independent, with hx of HTN, DM, HLD, CHF, CVA (> 15 years on plavix) presents c/o cough x 4 days and sob on day of admission.  In the emergency room she was started on BiPAP  Because of increased work of breathing and respiratory rate to 35, but did not tolerate it well. She was then transitioned to nasal cannula and is saturating well on 2 L.  She was also found to be in Afib with RVR to 193.  She was given Lopressor and her rate improve. She also found to have positive Influenza A, started on Tamiflu. And the ID consult requested to assist with further management.    # Sepsis ( Tachycardia + Leukocytosis + Hypoxia)  # Influenza A  # Multifocal pneumonia- most likely superimposed-  Elevated Procalcitonin level, 1.88 - MRSA PCR and Legionella urine Ag is negative    would recommend:    1. Continue Ceftriaxone to cover multifocal pneumonia   2. Continue Tamiflu X 10 doses  3. Droplet Isolation  4. Aspiration precaution  5. Supplemental oxygenation and Bronchodilator as needed    d/w Dr. Javier    Attending Attestation:    Spent more than 35 minutes on total encounter, more than 50 % of the visit was spent counseling and/or coordinating care by the Attending physician.           Patient is a 89y old  Female who is From home, ambulatory and independent, with hx of HTN, DM, HLD, CHF, CVA (> 15 years on plavix) presents c/o cough x 4 days and sob on day of admission.  In the emergency room she was started on BiPAP  Because of increased work of breathing and respiratory rate to 35, but did not tolerate it well. She was then transitioned to nasal cannula and is saturating well on 2 L.  She was also found to be in Afib with RVR to 193.  She was given Lopressor and her rate improve. She also found to have positive Influenza A, started on Tamiflu. And the ID consult requested to assist with further management.    # Sepsis ( Tachycardia + Leukocytosis + Hypoxia)- resolving  # Influenza A  # Multifocal pneumonia- most likely superimposed-  Elevated Procalcitonin level, 1.88 - MRSA PCR and Legionella urine Ag is negative    would recommend:    1. Continue Tamiflu X 10 doses  2. Change Ceftriaxone to Cefepime in the setting of recent hospitalization   3. Droplet Isolation  4. Aspiration precaution  5. Supplemental oxygenation and Bronchodilator as needed    d/w Dr. Javier    Attending Attestation:    Spent more than 35 minutes on total encounter, more than 50 % of the visit was spent counseling and/or coordinating care by the Attending physician.

## 2025-01-07 NOTE — SWALLOW BEDSIDE ASSESSMENT ADULT - SLP PERTINENT HISTORY OF CURRENT PROBLEM
89F w/ Hx bilateral hearing loss HTN, DM, CHF CVA. Pt placed on BiPad in ER due to increased work to breath and a respiratory rate of 35, Pt did not tolerate BiPad well. Afib RVR to 135. Reports shortness of breath, dizziness, & tested positive. Admitted for positive of influenza & pneumonia w/ other unidentified respiratory manifestations requiring oxygen support.

## 2025-01-07 NOTE — SWALLOW BEDSIDE ASSESSMENT ADULT - PHARYNGEAL PHASE
Pt voluntarily ejects bolus, no swallow x1/Decreased laryngeal elevation/Multiple swallows Within functional limits Pt voluntarily ejects bolus, no swallow/Decreased laryngeal elevation/Multiple swallows Decreased laryngeal elevation/Wet vocal quality post oral intake/Delayed cough post oral intake/Delayed throat clear post oral intake/Multiple swallows

## 2025-01-07 NOTE — PROGRESS NOTE ADULT - ASSESSMENT
89 yr old female From home, ambulatory and independent, with hx of HTN, DM, HLD, CHF, CVA (> 15 years on plavix) presents c/o cough x 4 days and sob on day of admission,Flu+,PAF with RVR,hyponatremia,multifocal pneumonia.  1.Flu-Tamiflu.  2.PAF-eliquis,low dose b blocker.  3.CVA-eliquis,statin.  4.Hyponatremia-Renal f/u.  5.HTN- off cozaar due to soft bp.  6.DM-Insulin.  7.Lipid d/o-statin.  8.PPI.  9.Multifocal pneumonia-abx,steroids.ID f/u.

## 2025-01-07 NOTE — SWALLOW BEDSIDE ASSESSMENT ADULT - ASR SWALLOW RECOMMEND DIAG
Modified Barium Swallow Study if advancement of diet is being considered; r/o on thin liquids./VFSS/MBS

## 2025-01-07 NOTE — DISCHARGE NOTE PROVIDER - HOSPITAL COURSE
89 yr old female from home, ambulatory and independent, with hx of HTN, DM, HLD, CHF, CVA (> 15 years on plavix) presents c/o cough x 4 days and sob on day of admission.  In the emergency room she was started on BiPAP  Because of increased work of breathing and respiratory rate to 35, but did not tolerate it well. She was then transitioned to nasal cannula and is saturating well on 2 L.  She was also found to be in Afib with RVR to 193.  She was given Lopressor and her rate improved. She reports some shortness of breath and dizziness but is otherwise doing well.  She tested positive for influenza A.  Pt. admitted for influenza requiring oxygen support.         Vitals: BPmin 100/50; HRmax 193 (afib rvr) RRmax 35; O2 93% on RA  Labs: pH 7.29 (CO2 48); flu A pos; wbc 13; Na 127; K 3.3; BUN/Cr 27/1.26  Intervention: decadron, IV lopressor 5, bipap  Consults: none  Images: CXR No acute cardiopulmonary disease process. Cardiomegaly.    INCOMPLETE 89 yr old female from home, ambulatory and independent, with hx of HTN, DM, HLD, CHF, CVA (> 15 years on plavix) presents c/o cough x 4 days and sob on day of admission.  In the emergency room she was started on BiPAP  Because of increased work of breathing and respiratory rate to 35, but did not tolerate it well. She was then transitioned to nasal cannula and is saturating well on 2 L.  She was also found to be in Afib with RVR to 193.  She was given Lopressor and her rate improved. She reports some shortness of breath and dizziness but is otherwise doing well.  She tested positive for influenza A.  Pt. admitted for influenza requiring oxygen support. Pt started on IV steroids & Tamiflu x 5days. Blood & urine Cx neg. CXR No acute cardiopulmonary disease process. Cardiomegaly.  PT recommends Home PT. Patient now on room air with no respiratory distress.         INCOMPLETE ???????? [birth year] 89 yr old female from home, ambulatory and independent, with hx of HTN, DM, HLD, CHF, CVA (> 15 years on plavix) presents c/o cough x 4 days and sob on day of admission.  In the emergency room she was started on BiPAP  Because of increased work of breathing and respiratory rate to 35, but did not tolerate it well. She was then transitioned to nasal cannula and is saturating well on 2 L.  She was also found to be in Afib with RVR to 193.  She was given Lopressor and her rate improved. She reports some shortness of breath and dizziness but is otherwise doing well.  She tested positive for influenza A.  Pt. admitted for influenza requiring oxygen support. Pt started on IV steroids & Tamiflu x 5days. Blood & urine Cx neg. CXR No acute cardiopulmonary disease process. Cardiomegaly.  PT recommends Home PT. Patient now on room air with no respiratory distress.     ID recommendations:   - Continue Cefepime while inpatient and may change to oral Augmentin 875mg q 12hours on discharge to continue until 1/11/25    Discussed with attending, pt is medically optimized for discharge today

## 2025-01-07 NOTE — SWALLOW BEDSIDE ASSESSMENT ADULT - SLP PRECAUTIONS/LIMITATIONS: HEARING
Bilateral hearing loss, Pt hears better in right ear & requested written communication for assistance/impaired

## 2025-01-07 NOTE — SWALLOW BEDSIDE ASSESSMENT ADULT - COMMENTS
Pt seen for bedside swallow evaluation, AA+Ox3, appears anxious, c/o xerostomia. [place]. HOB elevated to 90°. Lunch tray present, tray prep given by SLP.

## 2025-01-07 NOTE — SWALLOW BEDSIDE ASSESSMENT ADULT - SWALLOW EVAL: DIAGNOSIS
Pt p/w s&s oropharyngeal dysphagia c/b Increased mastication time 2/2 partial dentition, reduced bolus stripping (primarily lingual palatal mashing of bolus), decreased A/P movement of bolus, increased oral transit time. Appeared to have reduced base of tongue retraction felt upon palpation. Multiple swallows, delayed swallow trigger, reduced laryngeal elevation, consistent wet gurgly cough observed s/p swallow thin liquids. Immediate wet vocal quality s/p thin liquids. GERD cannot be ruled out.

## 2025-01-07 NOTE — PROGRESS NOTE ADULT - ASSESSMENT
89 yr old female with hx of HTN, DM, HLD, CHF, CVA (> 15 years on plavix).  Pt is admitted for influenza requiring oxygen support and LYNN with hyponatremia.  Started Tamiflu. She was noted to be in A-fib with RVR on admission but became rate controlled after Lopressor was given.    She to be admitted to telemetry for monitoring and oxygen support. Cardiology and Nephrology following.   Pt. followed by ID Dr. Tovar, treated with Tamiflu and Ceftriaxone.  1/7-CT chest/abdomen revealed multifocal PNA.

## 2025-01-07 NOTE — DISCHARGE NOTE PROVIDER - NSDCMRMEDTOKEN_GEN_ALL_CORE_FT
amLODIPine 5 mg oral tablet: 1 tab(s) orally once a day  clopidogrel 75 mg oral tablet: 1 tab(s) orally once a day  Jardiance 10 mg oral tablet: 1 tab(s) orally once a day  simvastatin 10 mg oral tablet: 1 tab(s) orally once a day (at bedtime)  torsemide 20 mg oral tablet: 1 tab(s) orally once a day   amLODIPine 5 mg oral tablet: 1 tab(s) orally once a day  amoxicillin-clavulanate 875 mg-125 mg oral tablet: 875 milligram(s) orally 2 times a day Stop after 1 day (1/11/25)  apixaban 2.5 mg oral tablet: 1 tab(s) orally every 12 hours  clopidogrel 75 mg oral tablet: 1 tab(s) orally once a day  Jardiance 10 mg oral tablet: 1 tab(s) orally once a day  simvastatin 10 mg oral tablet: 1 tab(s) orally once a day (at bedtime)   amoxicillin-clavulanate 875 mg-125 mg oral tablet: 875 milligram(s) orally 2 times a day Stop after 1 day (1/11/25)  apixaban 2.5 mg oral tablet: 1 tab(s) orally every 12 hours  clopidogrel 75 mg oral tablet: 1 tab(s) orally once a day  Jardiance 10 mg oral tablet: 1 tab(s) orally once a day  losartan 25 mg oral tablet: 1 tab(s) orally once a day  Metoprolol Tartrate 25 mg oral tablet: 1 tab(s) orally once a day  predniSONE 10 mg oral tablet: 1 tab(s) orally once a day Take 40mg (4 tabs) daily for 3 days  Take 30mg ( 3 tabs) daily for 3 days  Take 20mg (2 tabs) daily for 3 days  Take 10mg (1 tab) daily for 3 days  simvastatin 10 mg oral tablet: 1 tab(s) orally once a day (at bedtime)

## 2025-01-07 NOTE — PROGRESS NOTE ADULT - SUBJECTIVE AND OBJECTIVE BOX
Date of Service 01-07-25 @ 10:45    CHIEF COMPLAINT:Patient is a 89y old  Female who presents with a chief complaint of influenza.Pt appears comfortable.    	  REVIEW OF SYSTEMS:  CONSTITUTIONAL: No fever, weight loss, or fatigue  EYES: No eye pain, visual disturbances, or discharge  ENT:  No difficulty hearing, tinnitus, vertigo; No sinus or throat pain  NECK: No pain or stiffness  RESPIRATORY: No cough, wheezing, chills or hemoptysis; No Shortness of Breath  CARDIOVASCULAR: No chest pain, palpitations, passing out, dizziness, or leg swelling  GASTROINTESTINAL: No abdominal or epigastric pain. No nausea, vomiting, or hematemesis; No diarrhea or constipation. No melena or hematochezia.  GENITOURINARY: No dysuria, frequency, hematuria, or incontinence  NEUROLOGICAL: No headaches, memory loss, loss of strength, numbness, or tremors  SKIN: No itching, burning, rashes, or lesions   LYMPH Nodes: No enlarged glands  ENDOCRINE: No heat or cold intolerance; No hair loss  MUSCULOSKELETAL: No joint pain or swelling; No muscle, back, or extremity pain  PSYCHIATRIC: No depression, anxiety, mood swings, or difficulty sleeping  HEME/LYMPH: No easy bruising, or bleeding gums  ALLERGY AND IMMUNOLOGIC: No hives or eczema	        PHYSICAL EXAM:  T(C): 36.4 (01-07-25 @ 08:54), Max: 36.7 (01-06-25 @ 16:14)  HR: 56 (01-07-25 @ 08:54) (56 - 69)  BP: 135/56 (01-07-25 @ 08:54) (106/49 - 137/41)  RR: 19 (01-07-25 @ 08:54) (18 - 19)  SpO2: 97% (01-07-25 @ 08:54) (97% - 100%)  Wt(kg): --  I&O's Summary    06 Jan 2025 07:01  -  07 Jan 2025 07:00  --------------------------------------------------------  IN: 0 mL / OUT: 600 mL / NET: -600 mL        Appearance: Normal	  HEENT:   Normal oral mucosa, PERRL, EOMI	  Lymphatic: No lymphadenopathy  Cardiovascular: Normal S1 S2, No JVD, No murmurs, No edema  Respiratory: B/L ronchi  Psychiatry: A & O x 3, Mood & affect appropriate  Gastrointestinal:  Soft, Non-tender, + BS	  Skin: No rashes, No ecchymoses, No cyanosis	  Neurologic: Non-focal  Extremities: Normal range of motion, No clubbing, cyanosis or edema  Vascular: Peripheral pulses palpable 2+ bilaterally    MEDICATIONS  (STANDING):  albuterol/ipratropium for Nebulization 3 milliLiter(s) Nebulizer every 6 hours  apixaban 2.5 milliGRAM(s) Oral every 12 hours  atorvastatin 10 milliGRAM(s) Oral at bedtime  cefTRIAXone   IVPB 1000 milliGRAM(s) IV Intermittent every 24 hours  influenza  Vaccine (HIGH DOSE) 0.5 milliLiter(s) IntraMuscular once  insulin lispro (ADMELOG) corrective regimen sliding scale   SubCutaneous three times a day before meals  insulin lispro (ADMELOG) corrective regimen sliding scale   SubCutaneous at bedtime  methylPREDNISolone sodium succinate Injectable 40 milliGRAM(s) IV Push every 8 hours  metoprolol tartrate 12.5 milliGRAM(s) Oral two times a day  oseltamivir 30 milliGRAM(s) Oral two times a day        	  	  LABS:	 	                       12.3   10.75 )-----------( 334      ( 07 Jan 2025 09:15 )             37.1     01-07    134[L]  |  103  |  23[H]  ----------------------------<  98  4.3   |  24  |  0.71    Ca    8.7      07 Jan 2025 09:15  Phos  2.5     01-06  Mg     2.2     01-06        	  < from: CT Abdomen and Pelvis No Cont (01.06.25 @ 13:50) >  ACC: [id number] EXAM:  CT ABDOMEN AND PELVIS   ORDERED BY: ZULEMA EWING     ACC: [id number] EXAM:  CT CHEST   ORDERED BY: SAGE SPRING     PROCEDURE DATE:  01/06/2025          INTERPRETATION:  CLINICAL INFORMATION: Sepsis, diarrhea, elevated   procalcitonin.    COMPARISON: CT abdomen pelvis 3/6/2022.    CONTRAST/COMPLICATIONS:  IV Contrast: NONE  Oral Contrast: NONE  .    PROCEDURE:  CT of the Chest, Abdomen and Pelvis was performed.  Sagittal and coronal reformats were performed.    FINDINGS:    Evaluation of the solid organs and vasculature is limited in the absence   of intravenous contrast.  CHEST:  LUNGS AND LARGE AIRWAYS: Patent central airways. Patchy areas of   consolidation are noted in the right middle and lower lobes, suggesting   multifocal pneumonia. Dependent opacity in the left lung base favors   atelectasis. Scattered small calcified granulomata.  PLEURA: Trace right pleural effusion.  VESSELS: Within normal limits.  HEART: Cardiomegaly. No pericardial effusion. Aortic and coronary artery   calcifications.  MEDIASTINUM AND SAMSON: No lymphadenopathy. Nonenlarged mediastinal lymph   nodes with calcifications are noted.  CHEST WALL AND LOWER NECK: Within normal limits.    ABDOMEN AND PELVIS:  LIVER: Within normal limits.  BILE DUCTS: Normal caliber.  GALLBLADDER: Cholelithiasis.  SPLEEN: Within normal limits.  PANCREAS: Within normal limits.  ADRENALS: Within normal limits.  KIDNEYS/URETERS: Right renal cysts. No renal calculi or hydronephrosis.    BLADDER: Within normal limits.  REPRODUCTIVE ORGANS: Uterus and adnexa within normal limits.    BOWEL: No bowel obstruction. Appendix is not visualized. No evidence of   inflammation in the pericecal region.  PERITONEUM/RETROPERITONEUM: No ascites or pneumoperitoneum.  VESSELS: Atherosclerotic changes.  LYMPH NODES: No lymphadenopathy.  ABDOMINAL WALL: Rectus diastasis and small fat-containing periumbilical   hernia.  BONES: Degenerative changes.    IMPRESSION:  Multifocal pneumonia. Trace right pleural effusion.    No acute findings in the abdomen and pelvis.        --- End of Report ---            KATHE FISHER MD; Attending Radiologist  This document has been electronically signed. Jan 6 2025  4:35PM

## 2025-01-07 NOTE — SWALLOW BEDSIDE ASSESSMENT ADULT - ORAL PHASE
Decreased anterior-posterior movement of the bolus Within functional limits Decreased anterior-posterior movement of the bolus/Delayed oral transit time

## 2025-01-07 NOTE — DISCHARGE NOTE PROVIDER - NSDCCPCAREPLAN_GEN_ALL_CORE_FT
PRINCIPAL DISCHARGE DIAGNOSIS  Diagnosis: Acute hypoxic respiratory failure  Assessment and Plan of Treatment: You presented with shortness of breath, accompanied by cough requiring Bipap. This was likely due to multiple reasons pneumonia vs Influenza virus.   You were later transitioned to nasal cannula. You were treated with IV antibiotics for pneumonia.   You are now breathing freely on your own, & saturating with within normal oxygen level.  Acute Respiratory Failure is a condition that happens when your lungs cannot get enough oxygen into your blood. ARF can also happen when your lungs cannot get the carbon dioxide out of your blood. A buildup of carbon dioxide in your blood can cause damage to your organs. The decrease in oxygen and the buildup of carbon dioxide can happen at the same time. Acute respiratory failure may develop in minutes, hours, or days.  How is ARF treated? Treatment depends on the cause and how severe your symptoms are. You may need any of the following:  Medicines may be given to open your airways or relieve fluid buildup in your arms or legs. You may also need medicines to treat the cause of your ARF.  Call your local emergency number (911 in the ) or have someone call if:  You have more trouble catching your breath.  You have stopped breathing.  Extra oxygen may be given if your blood oxygen levels are low.        SECONDARY DISCHARGE DIAGNOSES  Diagnosis: Multifocal pneumonia  Assessment and Plan of Treatment: You were treated with IV antibiotics for pneumonia under the care of an infection disease doctor.  Continue to take your prescribed antibiotics until completion.  ID recommendations: ??????????????????????????      Diagnosis: Type A influenza  Assessment and Plan of Treatment: You were also noted with Influenza A virus. You were treated with Tamiflu x 5days & steroids.   You are advised to complete your medication as prescribed.  Follow up with your primary doctor after discharge.    Diagnosis: Atrial fibrillation with RVR  Assessment and Plan of Treatment: You were noted with very fast abnormal heart rythm called Afib with RVR, this may likely be due to pneumonia & Influenza virus.  You were treated with rate controlled medications  Your heart rate is now better controlled  Continue to take your medication as prescribed.   Follow up with your PCP & heart doctor 1 week after discharge.     PRINCIPAL DISCHARGE DIAGNOSIS  Diagnosis: Acute hypoxic respiratory failure  Assessment and Plan of Treatment: You presented with shortness of breath, accompanied by cough requiring Bipap. This was likely due to multiple reasons pneumonia vs Influenza virus.   You were later transitioned to nasal cannula. You were treated with IV antibiotics for pneumonia.   You are now breathing freely on your own, & saturating with within normal oxygen level.  Acute Respiratory Failure is a condition that happens when your lungs cannot get enough oxygen into your blood. ARF can also happen when your lungs cannot get the carbon dioxide out of your blood. A buildup of carbon dioxide in your blood can cause damage to your organs. The decrease in oxygen and the buildup of carbon dioxide can happen at the same time. Acute respiratory failure may develop in minutes, hours, or days.  How is ARF treated? Treatment depends on the cause and how severe your symptoms are. You may need any of the following:  Medicines may be given to open your airways or relieve fluid buildup in your arms or legs. You may also need medicines to treat the cause of your ARF.  Call your local emergency number (911 in the ) or have someone call if:  You have more trouble catching your breath.  You have stopped breathing.  Extra oxygen may be given if your blood oxygen levels are low.        SECONDARY DISCHARGE DIAGNOSES  Diagnosis: Multifocal pneumonia  Assessment and Plan of Treatment: You were treated with IV antibiotics for pneumonia under the care of an infection disease doctor.  Continue to take your prescribed antibiotics until completion.  ID recommendations:    - Continue Cefepime while inpatient and may change to oral Augmentin 875mg q 12hours on discharge to continue until 1/11/25      Diagnosis: Type A influenza  Assessment and Plan of Treatment: You were also noted with Influenza A virus. You were treated with Tamiflu x 5days & completed the course.  You were also treated with IV steroids.   You are advised to complete your medication as prescribed.  Follow up with your primary doctor after discharge.    Diagnosis: Atrial fibrillation with RVR  Assessment and Plan of Treatment: You were noted with very fast abnormal heart rythm called Afib with RVR, this may likely be due to pneumonia & Influenza virus.  You were treated with rate controlled medications  Your heart rate is now better controlled  Continue to take your medication as prescribed.   Follow up with your PCP & heart doctor 1 week after discharge.

## 2025-01-07 NOTE — DISCHARGE NOTE PROVIDER - CARE PROVIDER_API CALL
Antione Machado  Cardiovascular Disease  [address] [place] [address]  Phone: [phone]  Fax: [phone]  Established Patient  Follow Up Time: 1 week

## 2025-01-07 NOTE — PROGRESS NOTE ADULT - SUBJECTIVE AND OBJECTIVE BOX
NP Note discussed with  Primary Attending    Patient is a 89y old  Female who presents with a chief complaint of influenza (07 Jan 2025 10:45).  Pt. seen at bedside, sitting at edge of bed, eating, denies discomfort.      INTERVAL HPI/OVERNIGHT EVENTS: no new complaints    MEDICATIONS  (STANDING):  albuterol/ipratropium for Nebulization 3 milliLiter(s) Nebulizer every 6 hours  apixaban 2.5 milliGRAM(s) Oral every 12 hours  atorvastatin 10 milliGRAM(s) Oral at bedtime  cefTRIAXone   IVPB 1000 milliGRAM(s) IV Intermittent every 24 hours  influenza  Vaccine (HIGH DOSE) 0.5 milliLiter(s) IntraMuscular once  insulin lispro (ADMELOG) corrective regimen sliding scale   SubCutaneous three times a day before meals  insulin lispro (ADMELOG) corrective regimen sliding scale   SubCutaneous at bedtime  methylPREDNISolone sodium succinate Injectable 40 milliGRAM(s) IV Push every 8 hours  metoprolol tartrate 12.5 milliGRAM(s) Oral two times a day  oseltamivir 30 milliGRAM(s) Oral two times a day    MEDICATIONS  (PRN):  acetaminophen     Tablet .. 650 milliGRAM(s) Oral every 6 hours PRN Temp greater or equal to 38C (100.4F), Mild Pain (1 - 3)  Biotene Dry Mouth Oral Rinse 15 milliLiter(s) Swish and Spit every 4 hours PRN Mouth Care  melatonin 3 milliGRAM(s) Oral at bedtime PRN Insomnia  ondansetron Injectable 4 milliGRAM(s) IV Push every 8 hours PRN Nausea and/or Vomiting      __________________________________________________  REVIEW OF SYSTEMS:    CONSTITUTIONAL: No fever,   EYES: no acute visual disturbances  NECK: No pain or stiffness  RESPIRATORY: No cough; No shortness of breath  CARDIOVASCULAR: No chest pain, no palpitations  GASTROINTESTINAL: No pain. No nausea or vomiting; No diarrhea   NEUROLOGICAL: No headache or numbness, no tremors  MUSCULOSKELETAL: No joint pain, no muscle pain  GENITOURINARY: no dysuria, no frequency, no hesitancy  PSYCHIATRY: no depression , no anxiety  ALL OTHER  ROS negative        Vital Signs Last 24 Hrs  T(C): 36.4 (07 Jan 2025 11:20), Max: 36.7 (06 Jan 2025 16:14)  T(F): 97.5 (07 Jan 2025 11:20), Max: 98.1 (06 Jan 2025 16:14)  HR: 59 (07 Jan 2025 11:20) (56 - 69)  BP: 138/50 (07 Jan 2025 11:20) (106/49 - 138/50)  BP(mean): --  RR: 18 (07 Jan 2025 11:20) (18 - 19)  SpO2: 99% (07 Jan 2025 11:20) (97% - 100%)    Parameters below as of 07 Jan 2025 11:20  Patient On (Oxygen Delivery Method): nasal cannula  O2 Flow (L/min): 2      ________________________________________________  PHYSICAL EXAM:  Appropriate for age  GENERAL: NAD  HEENT: Normocephalic;  conjunctivae and sclerae clear; moist mucous membranes;   NECK : supple  CHEST/LUNG: Exp wheezing B/L, N/C 2L   HEART: S1 S2  regular; no murmurs, gallops or rubs  ABDOMEN: Soft, Nontender, Nondistended; Bowel sounds present  EXTREMITIES: no cyanosis; no edema; no calf tenderness  SKIN: warm and dry; no rash  NERVOUS SYSTEM:  Awake and alert; Oriented  to place, person and time ; no new deficits    _________________________________________________  LABS:                        12.3   10.75 )-----------( 334      ( 07 Jan 2025 09:15 )             37.1     01-07    134[L]  |  103  |  23[H]  ----------------------------<  98  4.3   |  24  |  0.71    Ca    8.7      07 Jan 2025 09:15  Phos  2.5     01-06  Mg     2.2     01-06        Urinalysis Basic - ( 07 Jan 2025 09:15 )    Color: x / Appearance: x / SG: x / pH: x  Gluc: 98 mg/dL / Ketone: x  / Bili: x / Urobili: x   Blood: x / Protein: x / Nitrite: x   Leuk Esterase: x / RBC: x / WBC x   Sq Epi: x / Non Sq Epi: x / Bacteria: x      CAPILLARY BLOOD GLUCOSE      POCT Blood Glucose.: 79 mg/dL (07 Jan 2025 08:36)  POCT Blood Glucose.: 105 mg/dL (06 Jan 2025 21:09)  POCT Blood Glucose.: 119 mg/dL (06 Jan 2025 17:53)  POCT Blood Glucose.: 102 mg/dL (06 Jan 2025 11:32)      RADIOLOGY & ADDITIONAL TESTS:    Imaging Personally Reviewed:  YES/NO    Consultant(s) Notes Reviewed:   YES/ No    Care Discussed with Consultants :     Plan of care was discussed with patient and /or primary care giver; all questions and concerns were addressed and care was aligned with patient's wishes.     NP Note discussed with  Primary Attending    Patient is a 89y old  Female who presents with a chief complaint of influenza (07 Jan 2025 10:45).  Pt. seen at bedside, sitting at edge of bed, eating, denies discomfort.      INTERVAL HPI/OVERNIGHT EVENTS: no new complaints    MEDICATIONS  (STANDING):  albuterol/ipratropium for Nebulization 3 milliLiter(s) Nebulizer every 6 hours  apixaban 2.5 milliGRAM(s) Oral every 12 hours  atorvastatin 10 milliGRAM(s) Oral at bedtime  cefTRIAXone   IVPB 1000 milliGRAM(s) IV Intermittent every 24 hours  influenza  Vaccine (HIGH DOSE) 0.5 milliLiter(s) IntraMuscular once  insulin lispro (ADMELOG) corrective regimen sliding scale   SubCutaneous three times a day before meals  insulin lispro (ADMELOG) corrective regimen sliding scale   SubCutaneous at bedtime  methylPREDNISolone sodium succinate Injectable 40 milliGRAM(s) IV Push every 8 hours  metoprolol tartrate 12.5 milliGRAM(s) Oral two times a day  oseltamivir 30 milliGRAM(s) Oral two times a day    MEDICATIONS  (PRN):  acetaminophen     Tablet .. 650 milliGRAM(s) Oral every 6 hours PRN Temp greater or equal to 38C (100.4F), Mild Pain (1 - 3)  Biotene Dry Mouth Oral Rinse 15 milliLiter(s) Swish and Spit every 4 hours PRN Mouth Care  melatonin 3 milliGRAM(s) Oral at bedtime PRN Insomnia  ondansetron Injectable 4 milliGRAM(s) IV Push every 8 hours PRN Nausea and/or Vomiting      __________________________________________________  REVIEW OF SYSTEMS:    CONSTITUTIONAL: No fever,   EYES: no acute visual disturbances  NECK: No pain or stiffness  RESPIRATORY: No cough; No shortness of breath  CARDIOVASCULAR: No chest pain, no palpitations  GASTROINTESTINAL: No pain. No nausea or vomiting; No diarrhea   NEUROLOGICAL: No headache or numbness, no tremors  MUSCULOSKELETAL: No joint pain, no muscle pain  GENITOURINARY: no dysuria, no frequency, no hesitancy  PSYCHIATRY: no depression , no anxiety  ALL OTHER  ROS negative        Vital Signs Last 24 Hrs  T(C): 36.4 (07 Jan 2025 11:20), Max: 36.7 (06 Jan 2025 16:14)  T(F): 97.5 (07 Jan 2025 11:20), Max: 98.1 (06 Jan 2025 16:14)  HR: 59 (07 Jan 2025 11:20) (56 - 69)  BP: 138/50 (07 Jan 2025 11:20) (106/49 - 138/50)  BP(mean): --  RR: 18 (07 Jan 2025 11:20) (18 - 19)  SpO2: 99% (07 Jan 2025 11:20) (97% - 100%)    Parameters below as of 07 Jan 2025 11:20  Patient On (Oxygen Delivery Method): nasal cannula  O2 Flow (L/min): 2      ________________________________________________  PHYSICAL EXAM:  Appropriate for age  GENERAL: NAD  HEENT: Normocephalic;  conjunctivae and sclerae clear; moist mucous membranes;   NECK : supple  CHEST/LUNG: Exp wheezing B/L, N/C 2L   HEART: S1 S2  regular; no murmurs, gallops or rubs  ABDOMEN: Soft, Nontender, Nondistended; Bowel sounds present  EXTREMITIES: no cyanosis; no edema; no calf tenderness  SKIN: warm and dry; no rash  NERVOUS SYSTEM:  Awake and alert; Oriented  to place, person and time ; no new deficits    _________________________________________________  LABS:                        12.3   10.75 )-----------( 334      ( 07 Jan 2025 09:15 )             37.1     01-07    134[L]  |  103  |  23[H]  ----------------------------<  98  4.3   |  24  |  0.71    Ca    8.7      07 Jan 2025 09:15  Phos  2.5     01-06  Mg     2.2     01-06        Urinalysis Basic - ( 07 Jan 2025 09:15 )    Color: x / Appearance: x / SG: x / pH: x  Gluc: 98 mg/dL / Ketone: x  / Bili: x / Urobili: x   Blood: x / Protein: x / Nitrite: x   Leuk Esterase: x / RBC: x / WBC x   Sq Epi: x / Non Sq Epi: x / Bacteria: x      CAPILLARY BLOOD GLUCOSE      POCT Blood Glucose.: 79 mg/dL (07 Jan 2025 08:36)  POCT Blood Glucose.: 105 mg/dL (06 Jan 2025 21:09)  POCT Blood Glucose.: 119 mg/dL (06 Jan 2025 17:53)  POCT Blood Glucose.: 102 mg/dL (06 Jan 2025 11:32)      RADIOLOGY & ADDITIONAL TESTS:    < from: CT Abdomen and Pelvis No Cont (01.06.25 @ 13:50) >    ACC: [id number] EXAM:  CT ABDOMEN AND PELVIS   ORDERED BY: ZULEMA EWING     ACC: [id number] EXAM:  CT CHEST   ORDERED BY: SAGE SPRING     PROCEDURE DATE:  01/06/2025          INTERPRETATION:  CLINICAL INFORMATION: Sepsis, diarrhea, elevated   procalcitonin.    COMPARISON: CT abdomen pelvis 3/6/2022.    CONTRAST/COMPLICATIONS:  IV Contrast: NONE  Oral Contrast: NONE  .    PROCEDURE:  CT of the Chest, Abdomen and Pelvis was performed.  Sagittal and coronal reformats were performed.    FINDINGS:    Evaluation of the solid organs and vasculature is limited in the absence   of intravenous contrast.  CHEST:  LUNGS AND LARGE AIRWAYS: Patent central airways. Patchy areas of   consolidation are noted in the right middle and lower lobes, suggesting   multifocal pneumonia. Dependent opacity in the left lung base favors   atelectasis. Scattered small calcified granulomata.  PLEURA: Trace right pleural effusion.  VESSELS: Within normal limits.  HEART: Cardiomegaly. No pericardial effusion. Aortic and coronary artery   calcifications.  MEDIASTINUM AND SAMSON: No lymphadenopathy. Nonenlarged mediastinal lymph   nodes with calcifications are noted.  CHEST WALL AND LOWER NECK: Within normal limits.    ABDOMEN AND PELVIS:  LIVER: Within normal limits.  BILE DUCTS: Normal caliber.  GALLBLADDER: Cholelithiasis.  SPLEEN: Within normal limits.  PANCREAS: Within normal limits.  ADRENALS: Within normal limits.  KIDNEYS/URETERS: Right renal cysts. No renal calculi or hydronephrosis.    BLADDER: Within normal limits.  REPRODUCTIVE ORGANS: Uterus and adnexa within normal limits.    BOWEL: No bowel obstruction. Appendix is not visualized. No evidence of   inflammation in the pericecal region.  PERITONEUM/RETROPERITONEUM: No ascites or pneumoperitoneum.  VESSELS: Atherosclerotic changes.  LYMPH NODES: No lymphadenopathy.  ABDOMINAL WALL: Rectus diastasis and small fat-containing periumbilical   hernia.  BONES: Degenerative changes.    IMPRESSION:  Multifocal pneumonia. Trace right pleural effusion.    No acute findings in the abdomen and pelvis.        --- End of Report ---    < end of copied text >    < from: Xray Chest 1 View- PORTABLE-Urgent (01.02.25 @ 17:57) >    ACC: [id number] EXAM:  XR CHEST PORTABLE URGENT 1V   ORDERED BY: MANDO PEARCE     PROCEDURE DATE:  01/02/2025          INTERPRETATION:  TECHNIQUE: Single portable view of the chest.    COMPARISON:  8/15/2024    CLINICAL HISTORY: Chest Pain    FINDINGS:    Single frontal view of the chest demonstrates the lungs to be clear. The   cardiomediastinal silhouette is enlarged. No acute osseous abnormalities.   Osteopenia.    IMPRESSION: No acute cardiopulmonary disease process. Cardiomegaly.    --- End of Report ---    < end of copied text >      Imaging Personally Reviewed:  YES/NO    Consultant(s) Notes Reviewed:   YES/ No    Care Discussed with Consultants :     Plan of care was discussed with patient and /or primary care giver; all questions and concerns were addressed and care was aligned with patient's wishes.

## 2025-01-07 NOTE — SWALLOW BEDSIDE ASSESSMENT ADULT - SWALLOW EVAL: RECOMMENDED FEEDING/EATING TECHNIQUES
Small bites to aid in easy swallowing for overall nutritional intake/allow for swallow between intakes/alternate food with liquid/check mouth frequently for oral residue/pocketing/maintain upright posture during/after eating for 30 mins/no straws/oral hygiene/position upright (90 degrees)/small sips/bites

## 2025-01-07 NOTE — PROGRESS NOTE ADULT - SUBJECTIVE AND OBJECTIVE BOX
Patient is seen and examined at the bed side, is afebrile. She is feeling better, breathing better on oxygen via NC. the CT chest shows Multifocal pneumonia.      REVIEW OF SYSTEMS: All other review systems are negative      ALLERGIES: No Known Allergies      Vital Signs Last 24 Hrs  T(C): 36.4 (2025 11:20), Max: 36.7 (2025 16:14)  T(F): 97.5 (2025 11:20), Max: 98.1 (2025 16:14)  HR: 59 (2025 11:20) (56 - 69)  BP: 138/50 (2025 11:20) (106/49 - 138/50)  BP(mean): --  RR: 18 (2025 11:20) (18 - 19)  SpO2: 99% (2025 11:20) (97% - 100%)    Parameters below as of 2025 11:20  Patient On (Oxygen Delivery Method): nasal cannula  O2 Flow (L/min): 2        PHYSICAL EXAM:  GENERAL: Not in distress , on oxygen via NC  CHEST/LUNG:  Air entry bilaterally  HEART: s1 and s2 present  ABDOMEN:  Nontender and  Nondistended  EXTREMITIES: No pedal  edema  CNS: Awake and Alert      LABS:                        12.3   10.75 )-----------( 334      ( 2025 09:15 )             37.1                           12.1   11.34 )-----------( 285      ( 2025 08:17 )             35.8           01-07    134[L]  |  103  |  23[H]  ----------------------------<  98  4.3   |  24  |  0.71    Ca    8.7      2025 09:15  Phos  2.5     01-06  Mg     2.2     01-06      01-06    136  |  104  |  32[H]  ----------------------------<  104[H]  4.4   |  23  |  0.82    Ca    8.3[L]      2025 08:17  Phos  2.5     01-06  Mg     2.2     01-06    TPro  8.2  /  Alb  3.7  /  TBili  0.7  /  DBili  x   /  AST  36  /  ALT  25  /  AlkPhos  239[H]          CAPILLARY BLOOD GLUCOSE  POCT Blood Glucose.: 91 mg/dL (2025 11:35)  POCT Blood Glucose.: 84 mg/dL (2025 07:59)  POCT Blood Glucose.: 110 mg/dL (2025 21:28)  POCT Blood Glucose.: 147 mg/dL (2025 18:25)  POCT Blood Glucose.: 175 mg/dL (2025 16:21)        Urinalysis Basic - ( 2025 08:05 )  Color: Yellow / Appearance: Clear / S.016 / pH: x  Gluc: x / Ketone: Trace mg/dL  / Bili: Negative / Urobili: 1.0 mg/dL   Blood: x / Protein: 100 mg/dL / Nitrite: Negative   Leuk Esterase: Moderate / RBC: 3 /HPF / WBC 4 /HPF   Sq Epi: x / Non Sq Epi: x / Bacteria: Few /HPF        MEDICATIONS  (STANDING):    albuterol/ipratropium for Nebulization 3 milliLiter(s) Nebulizer every 6 hours  apixaban 2.5 milliGRAM(s) Oral every 12 hours  atorvastatin 10 milliGRAM(s) Oral at bedtime  cefTRIAXone   IVPB 1000 milliGRAM(s) IV Intermittent every 24 hours  influenza  Vaccine (HIGH DOSE) 0.5 milliLiter(s) IntraMuscular once  insulin lispro (ADMELOG) corrective regimen sliding scale   SubCutaneous three times a day before meals  insulin lispro (ADMELOG) corrective regimen sliding scale   SubCutaneous at bedtime  methylPREDNISolone sodium succinate Injectable 40 milliGRAM(s) IV Push every 8 hours  metoprolol tartrate 12.5 milliGRAM(s) Oral two times a day  oseltamivir 30 milliGRAM(s) Oral two times a day      RADIOLOGY & ADDITIONAL TESTS:    25 : CT Chest No Cont (25 @ 13:49) >Multifocal pneumonia. Trace right pleural effusion.    No acute findings in the abdomen and pelvis.      25: Xray Chest 1 View- PORTABLE-Urgent (25 @ 17:57) >    IMPRESSION: No acute cardiopulmonary disease process. Cardiomegaly.        MICROBIOLOGY DATA:    Legionella pneumophila Antigen, Urine (25 @ 21:30)   Legionella Antigen, Urine: Negative:    MRSA/MSSA PCR (25 @ 21:30)   MRSA PCR Result.: NotDetec:    Culture - Blood in AM (25 @ 06:58)   Specimen Source: .Blood BLOOD  Culture Results: No growth at 48 hours    Culture - Blood in AM (25 @ 06:50)   Specimen Source: .Blood BLOOD  Culture Results: No growth at 48 hours    Procalcitonin (25 @ 06:50)   Procalcitonin: 1.88    FluA/FluB/RSV/COVID PCR (25 @ 17:12)   SARS-CoV-2 Result: NotDetec: This Respiratory Panel uses polymerase chain reaction (PCR) to detect for   influenza A; influenza B; and SARS-CoV-2.   This test was validated by [facility] and is in use under the FDA   Emergency Use Authorization (EUA) for clinical labs CLIA-certified to   perform high complexity testing. Test results should be correlated with   clinical presentation, patient history, and epidemiology.  Influenza A Result: Detected: notified dr annabel latham 2025 19:54:32 EST  Influenza B Result: NotDetec  Resp Syn Virus Result: NotDetec             Patient is seen and examined at the bed side, is afebrile. She is tolerating ceftriaxone well.      REVIEW OF SYSTEMS: All other review systems are negative      ALLERGIES: No Known Allergies      Vital Signs Last 24 Hrs  T(C): 36.4 (2025 11:20), Max: 36.7 (2025 16:14)  T(F): 97.5 (2025 11:20), Max: 98.1 (2025 16:14)  HR: 59 (2025 11:20) (56 - 69)  BP: 138/50 (2025 11:20) (106/49 - 138/50)  BP(mean): --  RR: 18 (2025 11:20) (18 - 19)  SpO2: 99% (2025 11:20) (97% - 100%)    Parameters below as of 2025 11:20  Patient On (Oxygen Delivery Method): nasal cannula  O2 Flow (L/min): 2      PHYSICAL EXAM:  GENERAL: Not in distress , on oxygen via NC  CHEST/LUNG:  Air entry bilaterally  HEART: s1 and s2 present  ABDOMEN:  Nontender and  Nondistended  EXTREMITIES: No pedal  edema  CNS: Awake and Alert      LABS:                        12.3   10.75 )-----------( 334      ( 2025 09:15 )             37.1                           12.1   11.34 )-----------( 285      ( 2025 08:17 )             35.8           01-07    134[L]  |  103  |  23[H]  ----------------------------<  98  4.3   |  24  |  0.71    Ca    8.7      2025 09:15  Phos  2.5     01-06  Mg     2.2     01-06      01-06    136  |  104  |  32[H]  ----------------------------<  104[H]  4.4   |  23  |  0.82    Ca    8.3[L]      2025 08:17  Phos  2.5     01-06  Mg     2.2     01-06    TPro  8.2  /  Alb  3.7  /  TBili  0.7  /  DBili  x   /  AST  36  /  ALT  25  /  AlkPhos  239[H]          CAPILLARY BLOOD GLUCOSE  POCT Blood Glucose.: 91 mg/dL (2025 11:35)  POCT Blood Glucose.: 84 mg/dL (2025 07:59)  POCT Blood Glucose.: 110 mg/dL (2025 21:28)  POCT Blood Glucose.: 147 mg/dL (2025 18:25)  POCT Blood Glucose.: 175 mg/dL (2025 16:21)        Urinalysis Basic - ( 2025 08:05 )  Color: Yellow / Appearance: Clear / S.016 / pH: x  Gluc: x / Ketone: Trace mg/dL  / Bili: Negative / Urobili: 1.0 mg/dL   Blood: x / Protein: 100 mg/dL / Nitrite: Negative   Leuk Esterase: Moderate / RBC: 3 /HPF / WBC 4 /HPF   Sq Epi: x / Non Sq Epi: x / Bacteria: Few /HPF      MEDICATIONS  (STANDING):    albuterol/ipratropium for Nebulization 3 milliLiter(s) Nebulizer every 6 hours  apixaban 2.5 milliGRAM(s) Oral every 12 hours  atorvastatin 10 milliGRAM(s) Oral at bedtime  cefTRIAXone   IVPB 1000 milliGRAM(s) IV Intermittent every 24 hours  influenza  Vaccine (HIGH DOSE) 0.5 milliLiter(s) IntraMuscular once  insulin lispro (ADMELOG) corrective regimen sliding scale   SubCutaneous three times a day before meals  insulin lispro (ADMELOG) corrective regimen sliding scale   SubCutaneous at bedtime  methylPREDNISolone sodium succinate Injectable 40 milliGRAM(s) IV Push every 8 hours  metoprolol tartrate 12.5 milliGRAM(s) Oral two times a day  oseltamivir 30 milliGRAM(s) Oral two times a day      RADIOLOGY & ADDITIONAL TESTS:    25 : CT Chest No Cont (25 @ 13:49) >Multifocal pneumonia. Trace right pleural effusion.    No acute findings in the abdomen and pelvis.      25: Xray Chest 1 View- PORTABLE-Urgent (25 @ 17:57) >    IMPRESSION: No acute cardiopulmonary disease process. Cardiomegaly.        MICROBIOLOGY DATA:    Legionella pneumophila Antigen, Urine (01.05.25 @ 21:30)   Legionella Antigen, Urine: Negative:    MRSA/MSSA PCR (25 @ 21:30)   MRSA PCR Result.: NotDetec:    Culture - Blood in AM (25 @ 06:58)   Specimen Source: .Blood BLOOD  Culture Results: No growth at 48 hours    Culture - Blood in AM (25 @ 06:50)   Specimen Source: .Blood BLOOD  Culture Results: No growth at 48 hours    Procalcitonin (25 @ 06:50)   Procalcitonin: 1.88    FluA/FluB/RSV/COVID PCR (25 @ 17:12)   SARS-CoV-2 Result: NotDetec: This Respiratory Panel uses polymerase chain reaction (PCR) to detect for   influenza A; influenza B; and SARS-CoV-2.   This test was validated by [facility] and is in use under the FDA   Emergency Use Authorization (EUA) for clinical labs CLIA-certified to   perform high complexity testing. Test results should be correlated with   clinical presentation, patient history, and epidemiology.  Influenza A Result: Detected: notified dr annabel latham 2025 19:54:32 EST  Influenza B Result: NotDetec  Resp Syn Virus Result: NotDetec

## 2025-01-07 NOTE — PROGRESS NOTE ADULT - SUBJECTIVE AND OBJECTIVE BOX
[   ] ICU                                          [   ] CCU                                      [  X ] Medical Floor    Patient is a 89 year old female with diarrhea. GI consulted to evaluate.         89 year old female from home, ambulatory and independent, with past medical history significant for HTN, DM, CHF, CVA, Rectal mass, Iron deficiency anemia, Hyperlipidemia, presented to the emergency room with 4 days history of SOB and cough. Patient was admitted with influenza. During the hospital course patient developed watery non bloody diarrhea. No abdominal pain, nausea, vomiting, hematemesis, hematochezia, melena, fever, chills, chest pain, SOB, cough, hematuria, dysuria, recent antibiotics intake or travelling reported.       Patient appears comfortable. No new complaints reported, No abdominal pain, N/V, hematemesis, hematochezia, melena, fever, chills, chest pain, SOB, cough or diarrhea reported.           PAIN MANAGEMENT:  Pain Scale:                 0/10  Pain Location:         PAST MEDICAL HISTORY    DM    HTN    CVA    High cholesterol    Iron (Fe) deficiency anemia    Rectal mass    Congestive heart failure (CHF)    Diverticulosis    Jejunum Diverticulom             PAST SURGICAL HISTORY    No significant past surgical history reported        Allergies    No Known Allergies    Intolerances  None        SOCIAL HISTORY  Advanced Directives:       [ X ] Full Code       [  ] DNR  Marital Status:         [  ] M      [ X ] S      [  ] D       [  ] W  Children:       [ X ] Yes      [  ] No  Occupation:        [  ] Employed       [ X ] Unemployed       [  ] Retired  Diet:       [ X ] Regular       [  ] PEG feeding          [  ] NG tube feeding  Drug Use:           [ X ] Patient denied          [  ] Yes  Alcohol:           [ X ] No             [  ] Yes (socially)         [  ] Yes (chronic)  Tobacco:           [  ] Yes           [ X ] No      FAMILY HISTORY  [X  ] Heart Disease            [ X ] Diabetes             [X  ] HTN             [  ] Colon Cancer             [  ] Stomach Cancer              [  ] Pancreatic Cancer        VITALS   Vital Signs Last 24 Hrs  T(C): 36.4 (01-07-25 @ 11:20), Max: 36.7 (01-06-25 @ 16:14)  T(F): 97.5 (01-07-25 @ 11:20), Max: 98.1 (01-06-25 @ 16:14)  HR: 59 (01-07-25 @ 11:20) (56 - 69)  BP: 138/50 (01-07-25 @ 11:20) (106/49 - 138/50)   RR: 18 (01-07-25 @ 11:20) (18 - 19)  SpO2: 99% (01-07-25 @ 11:20) (97% - 100%)      MEDICATIONS  (STANDING):  albuterol/ipratropium for Nebulization 3 milliLiter(s) Nebulizer every 6 hours  apixaban 2.5 milliGRAM(s) Oral every 12 hours  atorvastatin 10 milliGRAM(s) Oral at bedtime  cefTRIAXone   IVPB 1000 milliGRAM(s) IV Intermittent every 24 hours  influenza  Vaccine (HIGH DOSE) 0.5 milliLiter(s) IntraMuscular once  insulin lispro (ADMELOG) corrective regimen sliding scale   SubCutaneous three times a day before meals  insulin lispro (ADMELOG) corrective regimen sliding scale   SubCutaneous at bedtime  methylPREDNISolone sodium succinate Injectable 40 milliGRAM(s) IV Push every 8 hours  metoprolol tartrate 12.5 milliGRAM(s) Oral two times a day  oseltamivir 30 milliGRAM(s) Oral two times a day    MEDICATIONS  (PRN):  acetaminophen     Tablet .. 650 milliGRAM(s) Oral every 6 hours PRN Temp greater or equal to 38C (100.4F), Mild Pain (1 - 3)  Biotene Dry Mouth Oral Rinse 15 milliLiter(s) Swish and Spit every 4 hours PRN Mouth Care  melatonin 3 milliGRAM(s) Oral at bedtime PRN Insomnia  ondansetron Injectable 4 milliGRAM(s) IV Push every 8 hours PRN Nausea and/or Vomiting                            12.3   10.75 )-----------( 334      ( 07 Jan 2025 09:15 )             37.1       01-07    134[L]  |  103  |  23[H]  ----------------------------<  98  4.3   |  24  |  0.71    Ca    8.7      07 Jan 2025 09:15  Phos  2.5     01-06  Mg     2.2     01-06

## 2025-01-08 LAB
ANION GAP SERPL CALC-SCNC: 9 MMOL/L — SIGNIFICANT CHANGE UP (ref 5–17)
BUN SERPL-MCNC: 18 MG/DL — SIGNIFICANT CHANGE UP (ref 7–18)
CALCIUM SERPL-MCNC: 8.7 MG/DL — SIGNIFICANT CHANGE UP (ref 8.4–10.5)
CHLORIDE SERPL-SCNC: 101 MMOL/L — SIGNIFICANT CHANGE UP (ref 96–108)
CO2 SERPL-SCNC: 22 MMOL/L — SIGNIFICANT CHANGE UP (ref 22–31)
CREAT SERPL-MCNC: 0.55 MG/DL — SIGNIFICANT CHANGE UP (ref 0.5–1.3)
EGFR: 86 ML/MIN/1.73M2 — SIGNIFICANT CHANGE UP
GLUCOSE BLDC GLUCOMTR-MCNC: 154 MG/DL — HIGH (ref 70–99)
GLUCOSE BLDC GLUCOMTR-MCNC: 171 MG/DL — HIGH (ref 70–99)
GLUCOSE BLDC GLUCOMTR-MCNC: 194 MG/DL — HIGH (ref 70–99)
GLUCOSE BLDC GLUCOMTR-MCNC: 225 MG/DL — HIGH (ref 70–99)
GLUCOSE SERPL-MCNC: 177 MG/DL — HIGH (ref 70–99)
HCT VFR BLD CALC: 38.8 % — SIGNIFICANT CHANGE UP (ref 34.5–45)
HGB BLD-MCNC: 12.8 G/DL — SIGNIFICANT CHANGE UP (ref 11.5–15.5)
MCHC RBC-ENTMCNC: 28.6 PG — SIGNIFICANT CHANGE UP (ref 27–34)
MCHC RBC-ENTMCNC: 33 G/DL — SIGNIFICANT CHANGE UP (ref 32–36)
MCV RBC AUTO: 86.6 FL — SIGNIFICANT CHANGE UP (ref 80–100)
NRBC # BLD: 0 /100 WBCS — SIGNIFICANT CHANGE UP (ref 0–0)
PLATELET # BLD AUTO: 410 K/UL — HIGH (ref 150–400)
POTASSIUM SERPL-MCNC: 5 MMOL/L — SIGNIFICANT CHANGE UP (ref 3.5–5.3)
POTASSIUM SERPL-SCNC: 5 MMOL/L — SIGNIFICANT CHANGE UP (ref 3.5–5.3)
RBC # BLD: 4.48 M/UL — SIGNIFICANT CHANGE UP (ref 3.8–5.2)
RBC # FLD: 13.2 % — SIGNIFICANT CHANGE UP (ref 10.3–14.5)
SODIUM SERPL-SCNC: 132 MMOL/L — LOW (ref 135–145)
WBC # BLD: 6.31 K/UL — SIGNIFICANT CHANGE UP (ref 3.8–10.5)
WBC # FLD AUTO: 6.31 K/UL — SIGNIFICANT CHANGE UP (ref 3.8–10.5)

## 2025-01-08 PROCEDURE — 74230 X-RAY XM SWLNG FUNCJ C+: CPT | Mod: 26

## 2025-01-08 RX ADMIN — Medication 100 MILLIGRAM(S): at 21:20

## 2025-01-08 RX ADMIN — Medication 12.5 MILLIGRAM(S): at 05:40

## 2025-01-08 RX ADMIN — Medication 100 MILLIGRAM(S): at 14:24

## 2025-01-08 RX ADMIN — Medication 12.5 MILLIGRAM(S): at 17:09

## 2025-01-08 RX ADMIN — APIXABAN 2.5 MILLIGRAM(S): 5 TABLET, FILM COATED ORAL at 05:40

## 2025-01-08 RX ADMIN — METHYLPREDNISOLONE 40 MILLIGRAM(S): 4 TABLET ORAL at 21:21

## 2025-01-08 RX ADMIN — Medication 1: at 12:43

## 2025-01-08 RX ADMIN — Medication 1: at 17:09

## 2025-01-08 RX ADMIN — METHYLPREDNISOLONE 40 MILLIGRAM(S): 4 TABLET ORAL at 14:24

## 2025-01-08 RX ADMIN — Medication 100 MILLIGRAM(S): at 05:52

## 2025-01-08 RX ADMIN — ATORVASTATIN CALCIUM 10 MILLIGRAM(S): 40 TABLET, FILM COATED ORAL at 21:21

## 2025-01-08 RX ADMIN — IPRATROPIUM BROMIDE AND ALBUTEROL SULFATE 3 MILLILITER(S): .5; 2.5 SOLUTION RESPIRATORY (INHALATION) at 10:04

## 2025-01-08 RX ADMIN — APIXABAN 2.5 MILLIGRAM(S): 5 TABLET, FILM COATED ORAL at 17:09

## 2025-01-08 RX ADMIN — IPRATROPIUM BROMIDE AND ALBUTEROL SULFATE 3 MILLILITER(S): .5; 2.5 SOLUTION RESPIRATORY (INHALATION) at 20:21

## 2025-01-08 RX ADMIN — METHYLPREDNISOLONE 40 MILLIGRAM(S): 4 TABLET ORAL at 05:40

## 2025-01-08 RX ADMIN — OSELTAMIVIR 30 MILLIGRAM(S): 75 CAPSULE ORAL at 05:41

## 2025-01-08 RX ADMIN — Medication 1: at 08:34

## 2025-01-08 RX ADMIN — OSELTAMIVIR 30 MILLIGRAM(S): 75 CAPSULE ORAL at 17:09

## 2025-01-08 RX ADMIN — IPRATROPIUM BROMIDE AND ALBUTEROL SULFATE 3 MILLILITER(S): .5; 2.5 SOLUTION RESPIRATORY (INHALATION) at 15:39

## 2025-01-08 NOTE — PROGRESS NOTE ADULT - ASSESSMENT
89 yr old female From home, ambulatory and independent, with hx of HTN, DM, HLD, CHF, CVA (> 15 years on plavix) presents c/o cough x 4 days and sob on day of admission,Flu+,PAF with RVR,hyponatremia,multifocal pneumonia.  1.Flu-Tamiflu.  2.PAF-eliquis,low dose b blocker.  3.CVA-eliquis,statin.  4.Hyponatremia-Renal f/u.  5.HTN- off cozaar due to soft bp.  6.DM-Insulin.  7.Lipid d/o-statin.  8.PPI.  9.Multifocal pneumonia-abx,steroids.ID f/u.  10.Above plan d/w pts son.

## 2025-01-08 NOTE — SWALLOW VFSS/MBS ASSESSMENT ADULT - DEMONSTRATES NEED FOR REFERRAL TO ANOTHER SERVICE
Neuro/Psych to r/o dementia vs Anxiety;  Consider consult to GI to r/o esophageal/GI concerns, may have GERD vs motility issues./GI/neurology/occupational therapy/physical therapy/psychiatry/psychology

## 2025-01-08 NOTE — SWALLOW VFSS/MBS ASSESSMENT ADULT - PHARYNGEAL PHASE COMMENTS
With Thin Liquids: Pooling occured in the pyriform sinuses on cup sip only. Transient laryngeal penetration of thin liquids on rapid straw sips (x1 occurence); material reached the vocal folds, retrieved 90% during swallow. Cough and repeat spontaneous swallow appeared to clear it completely, as noted on subsequent imaging. No aspiration seen on any trials. WFL After initial swallow of both trials, oropharynx was completely filled with barium material, cleared with immediate repeat swallow. PHARYNGEAL PHASE: Mild delayed of swallow reflex noted, with reduced laryngeal elevation seen consistent with age related changes. Good epiglottic tilt visualized. With Thin Liquids: Pooling occured in the pyriform sinuses on cup sip only. Transient laryngeal penetration of thin liquids on rapid straw sips (x1 occurence); material reached the vocal folds, retrieved 90% during swallow. Cough and repeat spontaneous swallow appeared to clear it completely, as noted on subsequent imaging. No aspiration seen on any trials.

## 2025-01-08 NOTE — PROGRESS NOTE ADULT - SUBJECTIVE AND OBJECTIVE BOX
NP Note discussed with  primary attending    Patient is a 89y old  Female who presents with a chief complaint of influenza (08 Jan 2025 13:03)      INTERVAL HPI/OVERNIGHT EVENTS: no new complaints    MEDICATIONS  (STANDING):  albuterol/ipratropium for Nebulization 3 milliLiter(s) Nebulizer every 6 hours  apixaban 2.5 milliGRAM(s) Oral every 12 hours  atorvastatin 10 milliGRAM(s) Oral at bedtime  cefepime   IVPB      cefepime   IVPB 1000 milliGRAM(s) IV Intermittent every 8 hours  influenza  Vaccine (HIGH DOSE) 0.5 milliLiter(s) IntraMuscular once  insulin lispro (ADMELOG) corrective regimen sliding scale   SubCutaneous three times a day before meals  insulin lispro (ADMELOG) corrective regimen sliding scale   SubCutaneous at bedtime  methylPREDNISolone sodium succinate Injectable 40 milliGRAM(s) IV Push every 8 hours  metoprolol tartrate 12.5 milliGRAM(s) Oral two times a day  oseltamivir 30 milliGRAM(s) Oral two times a day    MEDICATIONS  (PRN):  acetaminophen     Tablet .. 650 milliGRAM(s) Oral every 6 hours PRN Temp greater or equal to 38C (100.4F), Mild Pain (1 - 3)  Biotene Dry Mouth Oral Rinse 15 milliLiter(s) Swish and Spit every 4 hours PRN Mouth Care  melatonin 3 milliGRAM(s) Oral at bedtime PRN Insomnia  ondansetron Injectable 4 milliGRAM(s) IV Push every 8 hours PRN Nausea and/or Vomiting      __________________________________________________  REVIEW OF SYSTEMS:    CONSTITUTIONAL: No fever,   EYES: no acute visual disturbances  NECK: No pain or stiffness  RESPIRATORY: No cough; No shortness of breath  CARDIOVASCULAR: No chest pain, no palpitations  GASTROINTESTINAL: No pain. No nausea or vomiting; No diarrhea   NEUROLOGICAL: No headache or numbness, no tremors  MUSCULOSKELETAL: No joint pain, no muscle pain  GENITOURINARY: no dysuria, no frequency, no hesitancy  PSYCHIATRY: no depression , no anxiety  ALL OTHER  ROS negative        Vital Signs Last 24 Hrs  T(C): 36.4 (08 Jan 2025 12:06), Max: 36.6 (07 Jan 2025 19:29)  T(F): 97.6 (08 Jan 2025 12:06), Max: 97.9 (07 Jan 2025 19:29)  HR: 78 (08 Jan 2025 12:06) (61 - 78)  BP: 146/65 (08 Jan 2025 12:06) (146/65 - 169/64)  BP(mean): --  RR: 18 (08 Jan 2025 12:06) (16 - 18)  SpO2: 93% (08 Jan 2025 12:06) (93% - 100%)    Parameters below as of 08 Jan 2025 12:06  Patient On (Oxygen Delivery Method): room air        ________________________________________________  PHYSICAL EXAM:  GENERAL: NAD  HEENT: Normocephalic;  conjunctivae and sclerae clear; moist mucous membranes;   NECK : supple  CHEST/LUNG: Clear to ausculitation bilaterally with good air entry   HEART: S1 S2  regular; no murmurs, gallops or rubs  ABDOMEN: Soft, Nontender, Nondistended; Bowel sounds present  EXTREMITIES: no cyanosis; no edema; no calf tenderness  SKIN: warm and dry; no rash  NERVOUS SYSTEM:  Awake and alert; Oriented  to place, person and time ; no new deficits    _________________________________________________  LABS:                        12.8   6.31  )-----------( 410      ( 08 Jan 2025 05:18 )             38.8     01-08    132[L]  |  101  |  18  ----------------------------<  177[H]  5.0   |  22  |  0.55    Ca    8.7      08 Jan 2025 05:18        Urinalysis Basic - ( 08 Jan 2025 05:18 )    Color: x / Appearance: x / SG: x / pH: x  Gluc: 177 mg/dL / Ketone: x  / Bili: x / Urobili: x   Blood: x / Protein: x / Nitrite: x   Leuk Esterase: x / RBC: x / WBC x   Sq Epi: x / Non Sq Epi: x / Bacteria: x      CAPILLARY BLOOD GLUCOSE      POCT Blood Glucose.: 171 mg/dL (08 Jan 2025 11:57)  POCT Blood Glucose.: 154 mg/dL (08 Jan 2025 07:41)  POCT Blood Glucose.: 175 mg/dL (07 Jan 2025 21:27)  POCT Blood Glucose.: 149 mg/dL (07 Jan 2025 17:03)        RADIOLOGY & ADDITIONAL TESTS:  < from: CT Abdomen and Pelvis No Cont (01.06.25 @ 13:50) >    IMPRESSION:  Multifocal pneumonia. Trace right pleural effusion.    No acute findings in the abdomen and pelvis.      < end of copied text >    Imaging Personally Reviewed:  YES    Consultant(s) Notes Reviewed:   YES    Care Discussed with Consultants :     Plan of care was discussed with patient and /or primary care giver; all questions and concerns were addressed and care was aligned with patient's wishes.

## 2025-01-08 NOTE — SWALLOW VFSS/MBS ASSESSMENT ADULT - SLP GENERAL OBSERVATIONS
Study conducted with Radiologist, Dr. Ambrocio. Pt seated in armchair in left lateral view for videofluoroscopy. Pt appeared anxious, required encouragement to participate with exam. PO trials of Soft & Bite sized solids, Purees, mildly thick and thin liquids administered.

## 2025-01-08 NOTE — SWALLOW VFSS/MBS ASSESSMENT ADULT - ORAL PHASE
within functional limits Uncontrolled bolus / spillover in vannesa-pharynx Uncontrolled bolus / spillover in vannesa-pharynx/Uncontrolled bolus / spillover in hypopharynx

## 2025-01-08 NOTE — SWALLOW VFSS/MBS ASSESSMENT ADULT - ESOPHAGEAL STAGE
ESOPHAGEAL: Stasis was visualized in upper-to-mid esophageal region on Puree and minced-moist trials. (Soft-Bite sized trials could not be seen due to shoulder/arm occulsion). Hoda be suggestive of motility vs GERD concerns.

## 2025-01-08 NOTE — SWALLOW VFSS/MBS ASSESSMENT ADULT - MODE OF PRESENTATION
spoon/fed by clinician x 1 each/cup/spoon/fed by clinician x 1/spoon/fed by clinician x 2/spoon/fed by clinician x1 cup; rapid straw sips x 3/cup/straw/fed by clinician

## 2025-01-08 NOTE — PROGRESS NOTE ADULT - SUBJECTIVE AND OBJECTIVE BOX
[   ] ICU                                          [   ] CCU                                      [ X  ] Medical Floor    Patient is a 89 year old female with diarrhea. GI consulted to evaluate.         89 year old female from home, ambulatory and independent, with past medical history significant for HTN, DM, CHF, CVA, Rectal mass, Iron deficiency anemia, Hyperlipidemia, presented to the emergency room with 4 days history of SOB and cough. Patient was admitted with influenza. During the hospital course patient developed watery non bloody diarrhea. No abdominal pain, nausea, vomiting, hematemesis, hematochezia, melena, fever, chills, chest pain, SOB, cough, hematuria, dysuria, recent antibiotics intake or travelling reported.       Patient appears comfortable. No new complaints reported, No abdominal pain, N/V, hematemesis, hematochezia, melena, fever, chills, chest pain, SOB, cough or diarrhea reported.           PAIN MANAGEMENT:  Pain Scale:                 0/10  Pain Location:         PAST MEDICAL HISTORY    DM    HTN    CVA    High cholesterol    Iron (Fe) deficiency anemia    Rectal mass    Congestive heart failure (CHF)    Diverticulosis    Jejunum Diverticulom             PAST SURGICAL HISTORY    No significant past surgical history reported        Allergies    No Known Allergies    Intolerances  None        SOCIAL HISTORY  Advanced Directives:       [ X ] Full Code       [  ] DNR  Marital Status:         [  ] M      [ X ] S      [  ] D       [  ] W  Children:       [ X ] Yes      [  ] No  Occupation:        [  ] Employed       [ X ] Unemployed       [  ] Retired  Diet:       [ X ] Regular       [  ] PEG feeding          [  ] NG tube feeding  Drug Use:           [ X ] Patient denied          [  ] Yes  Alcohol:           [ X ] No             [  ] Yes (socially)         [  ] Yes (chronic)  Tobacco:           [  ] Yes           [ X ] No      FAMILY HISTORY  [X  ] Heart Disease            [ X ] Diabetes             [X  ] HTN             [  ] Colon Cancer             [  ] Stomach Cancer              [  ] Pancreatic Cancer        VITALS   Vital Signs Last 24 Hrs  T(C): 36.6 (01-09-25 @ 04:55), Max: 36.8 (01-08-25 @ 20:01)  T(F): 97.8 (01-09-25 @ 04:55), Max: 98.2 (01-08-25 @ 20:01)  HR: 66 (01-09-25 @ 10:46) (62 - 82)  BP: 177/76 (01-09-25 @ 10:46) (130/73 - 177/76)  BP(mean): 110 (01-09-25 @ 10:46) (89 - 110)  RR: 18 (01-09-25 @ 04:55) (18 - 18)  SpO2: 97% (01-09-25 @ 10:46) (94% - 97%)      MEDICATIONS  (STANDING):  albuterol/ipratropium for Nebulization 3 milliLiter(s) Nebulizer every 6 hours  apixaban 2.5 milliGRAM(s) Oral every 12 hours  atorvastatin 10 milliGRAM(s) Oral at bedtime  cefepime   IVPB      cefepime   IVPB 1000 milliGRAM(s) IV Intermittent every 8 hours  insulin lispro (ADMELOG) corrective regimen sliding scale   SubCutaneous three times a day before meals  insulin lispro (ADMELOG) corrective regimen sliding scale   SubCutaneous at bedtime  methylPREDNISolone sodium succinate Injectable 40 milliGRAM(s) IV Push every 12 hours  metoprolol tartrate 12.5 milliGRAM(s) Oral two times a day    MEDICATIONS  (PRN):  acetaminophen     Tablet .. 650 milliGRAM(s) Oral every 6 hours PRN Temp greater or equal to 38C (100.4F), Mild Pain (1 - 3)  Biotene Dry Mouth Oral Rinse 15 milliLiter(s) Swish and Spit every 4 hours PRN Mouth Care  melatonin 3 milliGRAM(s) Oral at bedtime PRN Insomnia  ondansetron Injectable 4 milliGRAM(s) IV Push every 8 hours PRN Nausea and/or Vomiting                            12.8   6.31  )-----------( 410      ( 08 Jan 2025 05:18 )             38.8

## 2025-01-08 NOTE — PROGRESS NOTE ADULT - ASSESSMENT
Patient is a 89y old  Female who is From home, ambulatory and independent, with hx of HTN, DM, HLD, CHF, CVA (> 15 years on plavix) presents c/o cough x 4 days and sob on day of admission.  In the emergency room she was started on BiPAP  Because of increased work of breathing and respiratory rate to 35, but did not tolerate it well. She was then transitioned to nasal cannula and is saturating well on 2 L.  She was also found to be in Afib with RVR to 193.  She was given Lopressor and her rate improve. She also found to have positive Influenza A, started on Tamiflu. And the ID consult requested to assist with further management.    # Sepsis ( Tachycardia + Leukocytosis + Hypoxia)- resolving  # Influenza A - on Tamiflu  # Multifocal pneumonia- most likely superimposed-  Elevated Procalcitonin level, 1.88 - MRSA PCR and Legionella urine Ag is negative    would recommend:    1. OOB to chair  2. Continue Tamiflu X 10 doses  3. Continue Cefepime in the setting of recent hospitalization   4. Droplet Isolation  5. Aspiration precaution  6. Supplemental oxygenation and Bronchodilator as needed    d/w Dr. Javier    Attending Attestation:    Spent more than 35 minutes on total encounter, more than 50 % of the visit was spent counseling and/or coordinating care by the Attending physician.

## 2025-01-08 NOTE — SWALLOW VFSS/MBS ASSESSMENT ADULT - ORAL PREP COMMENTS
ORAL PHASE: Adequate labial seal achieved with functional bolus containment. Good velopharyngeal movement and closure. Slightly reduced oral transit, with prolong holding on Minced-Moist and Soft & Bite Sized solids. Notably, Pt did not exhibit rotary chew with first trial of Soft & Bite Sized solids, despite SLP cueing (likely due to Habematolel); Pt swallowed trial whole. Oral stripping of solids were primarily lingual-palatal mashing. Premature spillage over base of tongue to the hypopharynx on large cup sip of thin liquids only. Notably, Pt did not demonstrate rotary chew upon first trial, likely could not hear cues from SLP. However, Pt was heavily cued prior to 2nd trial. Oral prep was primarily lingual palatal mashing.

## 2025-01-08 NOTE — SWALLOW VFSS/MBS ASSESSMENT ADULT - DIAGNOSTIC IMPRESSIONS
Pt p/w oropharyngeal dysphagia with psychogenic component (suspected dementia vs anxiety) c/b forgetfulness during oral phase processes, prolonged oral bolus holding, reduced pharyngeal control of bolus with larger size & rapid presentations. Transient laryngeal penetration of thin liquids on rapid straw sips (x1 occurence); material reached the vocal folds, retrieved 90% during swallow. Cough and repeat spontaneous swallow appeared to clear it completely, as noted on subsequent imaging. No aspiration seen on any trials. Esophageal stasis was seen early in study with solids only, may be suggestive of motility vs GERD concerns.

## 2025-01-08 NOTE — PROGRESS NOTE ADULT - ASSESSMENT
89 yr old female with hx of HTN, DM, HLD, CHF, CVA (> 15 years on plavix).  Pt is admitted for influenza requiring oxygen support and LYNN with hyponatremia.  Started Tamiflu. She was noted to be in A-fib with RVR on admission but became rate controlled after Lopressor was given.    She to be admitted to telemetry for monitoring and oxygen support. Cardiology and Nephrology following.   Pt. followed by ID Dr. Tovar, treated with Tamiflu and Ceftriaxone. Hospital course complicated with CT chest/abdomen revealing multifocal PNA. Cefepime started. Bcx/Ucx ngtd

## 2025-01-08 NOTE — PROGRESS NOTE ADULT - SUBJECTIVE AND OBJECTIVE BOX
Patient is seen and examined at the bed side, is afebrile. She mentioned feeling better.      REVIEW OF SYSTEMS: All other review systems are negative      ALLERGIES: No Known Allergies      Vital Signs Last 24 Hrs  T(C): 36.4 (2025 12:06), Max: 36.6 (2025 19:29)  T(F): 97.6 (2025 12:06), Max: 97.9 (2025 19:29)  HR: 78 (2025 12:06) (61 - 78)  BP: 146/65 (2025 12:06) (146/65 - 169/64)  BP(mean): --  RR: 18 (2025 12:06) (16 - 18)  SpO2: 93% (2025 12:06) (93% - 99%)    Parameters below as of 2025 12:06  Patient On (Oxygen Delivery Method): room air      PHYSICAL EXAM:  GENERAL: Not in distress   CHEST/LUNG:  Air entry bilaterally  HEART: s1 and s2 present  ABDOMEN:  Nontender and  Nondistended  EXTREMITIES: No pedal  edema  CNS: Awake and Alert        LABS:                        12.8   6.31  )-----------( 410      ( 2025 05:18 )             38.8                           12.3   10.75 )-----------( 334      ( 2025 09:15 )             37.1       01-08    132[L]  |  101  |  18  ----------------------------<  177[H]  5.0   |  22  |  0.55    Ca    8.7      2025 05:18      01-07    134[L]  |  103  |  23[H]  ----------------------------<  98  4.3   |  24  |  0.71    Ca    8.7      2025 09:15  Phos  2.5     01-06  Mg     2.2     -06    TPro  8.2  /  Alb  3.7  /  TBili  0.7  /  DBili  x   /  AST  36  /  ALT  25  /  AlkPhos  239[H]  -        CAPILLARY BLOOD GLUCOSE  POCT Blood Glucose.: 91 mg/dL (2025 11:35)  POCT Blood Glucose.: 84 mg/dL (2025 07:59)  POCT Blood Glucose.: 110 mg/dL (2025 21:28)  POCT Blood Glucose.: 147 mg/dL (2025 18:25)  POCT Blood Glucose.: 175 mg/dL (2025 16:21)        Urinalysis Basic - ( 2025 08:05 )  Color: Yellow / Appearance: Clear / S.016 / pH: x  Gluc: x / Ketone: Trace mg/dL  / Bili: Negative / Urobili: 1.0 mg/dL   Blood: x / Protein: 100 mg/dL / Nitrite: Negative   Leuk Esterase: Moderate / RBC: 3 /HPF / WBC 4 /HPF   Sq Epi: x / Non Sq Epi: x / Bacteria: Few /HPF      MEDICATIONS  (STANDING):    albuterol/ipratropium for Nebulization 3 milliLiter(s) Nebulizer every 6 hours  apixaban 2.5 milliGRAM(s) Oral every 12 hours  atorvastatin 10 milliGRAM(s) Oral at bedtime  cefepime   IVPB      cefepime   IVPB 1000 milliGRAM(s) IV Intermittent every 8 hours  insulin lispro (ADMELOG) corrective regimen sliding scale   SubCutaneous three times a day before meals  insulin lispro (ADMELOG) corrective regimen sliding scale   SubCutaneous at bedtime  methylPREDNISolone sodium succinate Injectable 40 milliGRAM(s) IV Push every 8 hours  metoprolol tartrate 12.5 milliGRAM(s) Oral two times a day  oseltamivir 30 milliGRAM(s) Oral two times a day      RADIOLOGY & ADDITIONAL TESTS:    25 : CT Chest No Cont (25 @ 13:49) >Multifocal pneumonia. Trace right pleural effusion.    No acute findings in the abdomen and pelvis.      25: Xray Chest 1 View- PORTABLE-Urgent (25 @ 17:57) >    IMPRESSION: No acute cardiopulmonary disease process. Cardiomegaly.        MICROBIOLOGY DATA:    Legionella pneumophila Antigen, Urine (25 @ 21:30)   Legionella Antigen, Urine: Negative:    MRSA/MSSA PCR (25 @ 21:30)   MRSA PCR Result.: NotDetec:    Culture - Blood in AM (25 @ 06:58)   Specimen Source: .Blood BLOOD  Culture Results: No growth at 72 hours    Culture - Blood in AM (25 @ 06:50)   Specimen Source: .Blood BLOOD  Culture Results: No growth at 72 hours    Procalcitonin (25 @ 06:50)   Procalcitonin: 1.88    FluA/FluB/RSV/COVID PCR (25 @ 17:12)   SARS-CoV-2 Result: NotDetec: This Respiratory Panel uses polymerase chain reaction (PCR) to detect for   influenza A; influenza B; and SARS-CoV-2.   This test was validated by [facility] and is in use under the FDA   Emergency Use Authorization (EUA) for clinical labs CLIA-certified to   perform high complexity testing. Test results should be correlated with   clinical presentation, patient history, and epidemiology.  Influenza A Result: Detected: notified dr annabel latham 2025 19:54:32 EST  Influenza B Result: NotDetec  Resp Syn Virus Result: NotDetec

## 2025-01-09 LAB
ANION GAP SERPL CALC-SCNC: 6 MMOL/L — SIGNIFICANT CHANGE UP (ref 5–17)
BUN SERPL-MCNC: 21 MG/DL — HIGH (ref 7–18)
CALCIUM SERPL-MCNC: 8.8 MG/DL — SIGNIFICANT CHANGE UP (ref 8.4–10.5)
CHLORIDE SERPL-SCNC: 102 MMOL/L — SIGNIFICANT CHANGE UP (ref 96–108)
CO2 SERPL-SCNC: 23 MMOL/L — SIGNIFICANT CHANGE UP (ref 22–31)
CREAT SERPL-MCNC: 0.66 MG/DL — SIGNIFICANT CHANGE UP (ref 0.5–1.3)
EGFR: 82 ML/MIN/1.73M2 — SIGNIFICANT CHANGE UP
GLUCOSE BLDC GLUCOMTR-MCNC: 163 MG/DL — HIGH (ref 70–99)
GLUCOSE BLDC GLUCOMTR-MCNC: 201 MG/DL — HIGH (ref 70–99)
GLUCOSE BLDC GLUCOMTR-MCNC: 204 MG/DL — HIGH (ref 70–99)
GLUCOSE BLDC GLUCOMTR-MCNC: 221 MG/DL — HIGH (ref 70–99)
GLUCOSE SERPL-MCNC: 193 MG/DL — HIGH (ref 70–99)
POTASSIUM SERPL-MCNC: 4.8 MMOL/L — SIGNIFICANT CHANGE UP (ref 3.5–5.3)
POTASSIUM SERPL-SCNC: 4.8 MMOL/L — SIGNIFICANT CHANGE UP (ref 3.5–5.3)
SODIUM SERPL-SCNC: 131 MMOL/L — LOW (ref 135–145)

## 2025-01-09 RX ORDER — METHYLPREDNISOLONE 4 MG/1
40 TABLET ORAL EVERY 12 HOURS
Refills: 0 | Status: DISCONTINUED | OUTPATIENT
Start: 2025-01-09 | End: 2025-01-10

## 2025-01-09 RX ADMIN — Medication 100 MILLIGRAM(S): at 14:08

## 2025-01-09 RX ADMIN — APIXABAN 2.5 MILLIGRAM(S): 5 TABLET, FILM COATED ORAL at 05:19

## 2025-01-09 RX ADMIN — IPRATROPIUM BROMIDE AND ALBUTEROL SULFATE 3 MILLILITER(S): .5; 2.5 SOLUTION RESPIRATORY (INHALATION) at 15:34

## 2025-01-09 RX ADMIN — Medication 1: at 17:48

## 2025-01-09 RX ADMIN — OSELTAMIVIR 30 MILLIGRAM(S): 75 CAPSULE ORAL at 05:19

## 2025-01-09 RX ADMIN — Medication 12.5 MILLIGRAM(S): at 05:19

## 2025-01-09 RX ADMIN — ACETAMINOPHEN 650 MILLIGRAM(S): 80 SOLUTION/ DROPS ORAL at 21:57

## 2025-01-09 RX ADMIN — Medication 12.5 MILLIGRAM(S): at 17:48

## 2025-01-09 RX ADMIN — ATORVASTATIN CALCIUM 10 MILLIGRAM(S): 40 TABLET, FILM COATED ORAL at 21:57

## 2025-01-09 RX ADMIN — ACETAMINOPHEN 650 MILLIGRAM(S): 80 SOLUTION/ DROPS ORAL at 22:57

## 2025-01-09 RX ADMIN — Medication 2: at 12:13

## 2025-01-09 RX ADMIN — Medication 100 MILLIGRAM(S): at 21:56

## 2025-01-09 RX ADMIN — Medication 100 MILLIGRAM(S): at 05:25

## 2025-01-09 RX ADMIN — METHYLPREDNISOLONE 40 MILLIGRAM(S): 4 TABLET ORAL at 17:48

## 2025-01-09 RX ADMIN — Medication 2: at 08:35

## 2025-01-09 RX ADMIN — IPRATROPIUM BROMIDE AND ALBUTEROL SULFATE 3 MILLILITER(S): .5; 2.5 SOLUTION RESPIRATORY (INHALATION) at 20:26

## 2025-01-09 RX ADMIN — APIXABAN 2.5 MILLIGRAM(S): 5 TABLET, FILM COATED ORAL at 17:48

## 2025-01-09 RX ADMIN — METHYLPREDNISOLONE 40 MILLIGRAM(S): 4 TABLET ORAL at 05:18

## 2025-01-09 NOTE — PROGRESS NOTE ADULT - ASSESSMENT
Patient is a 89y old  Female who is From home, ambulatory and independent, with hx of HTN, DM, HLD, CHF, CVA (> 15 years on plavix) presents c/o cough x 4 days and sob on day of admission.  In the emergency room she was started on BiPAP  Because of increased work of breathing and respiratory rate to 35, but did not tolerate it well. She was then transitioned to nasal cannula and is saturating well on 2 L.  She was also found to be in Afib with RVR to 193.  She was given Lopressor and her rate improve. She also found to have positive Influenza A, started on Tamiflu. And the ID consult requested to assist with further management.    # Sepsis ( Tachycardia + Leukocytosis + Hypoxia)- resolving  # Influenza A - on Tamiflu  # Multifocal pneumonia- most likely superimposed-  Elevated Procalcitonin level, 1.88 - MRSA PCR and Legionella urine Ag is negative    would recommend:    1. OOB to chair  2. Continue Tamiflu X 10 doses  3. Continue Cefepime in the setting of recent hospitalization   4. Droplet Isolation  5. Aspiration precaution  6. Supplemental oxygenation and Bronchodilator as needed    d/w Dr. Javier    Attending Attestation:    Spent more than 35 minutes on total encounter, more than 50 % of the visit was spent counseling and/or coordinating care by the Attending physician.       Patient is a 89y old  Female who is From home, ambulatory and independent, with hx of HTN, DM, HLD, CHF, CVA (> 15 years on plavix) presents c/o cough x 4 days and sob on day of admission.  In the emergency room she was started on BiPAP  Because of increased work of breathing and respiratory rate to 35, but did not tolerate it well. She was then transitioned to nasal cannula and is saturating well on 2 L.  She was also found to be in Afib with RVR to 193.  She was given Lopressor and her rate improve. She also found to have positive Influenza A, started on Tamiflu. And the ID consult requested to assist with further management.    # Sepsis ( Tachycardia + Leukocytosis + Hypoxia)- resolving  # Influenza A - s/p completed  the course of Tamiflu  # Multifocal pneumonia- most likely superimposed-  Elevated Procalcitonin level, 1.88 - MRSA PCR and Legionella urine Ag is negative    would recommend:    1. OOB to chair  2. Continue Cefepime while inpatient and may change to oral Augmentin 875mg q 12hours on discharge to continue until 1/11/25  3. Droplet Isolation  4. Aspiration precaution  5. Supplemental oxygenation and Bronchodilator as needed    d/w covering NP, [place]    Attending Attestation:    Spent more than 35 minutes on total encounter, more than 50 % of the visit was spent counseling and/or coordinating care by the Attending physician.

## 2025-01-09 NOTE — DIETITIAN INITIAL EVALUATION ADULT - PROBLEM SELECTOR PLAN 2
EKG with A-fib plus RVR rhythm to 193 in the emergency room  Does not seem to be on beta-blocker outpatient   Per chart review, unclear if has A-fib in the past  Admit to telemetry for monitoring

## 2025-01-09 NOTE — PROGRESS NOTE ADULT - PROBLEM SELECTOR PROBLEM 11
Discharge planning issues
balance training/gait training/neuromuscular re-education/postural re-education/ROM/strengthening/transfer training

## 2025-01-09 NOTE — PROGRESS NOTE ADULT - PROBLEM SELECTOR PROBLEM 4
PAF (paroxysmal atrial fibrillation)
Hyponatremia
PAF (paroxysmal atrial fibrillation)

## 2025-01-09 NOTE — DIETITIAN INITIAL EVALUATION ADULT - PERTINENT LABORATORY DATA
01-09    131[L]  |  102  |  21[H]  ----------------------------<  193[H]  4.8   |  23  |  0.66    Ca    8.8      09 Jan 2025 05:20    POCT Blood Glucose.: 204 mg/dL (01-09-25 @ 11:37)  A1C with Estimated Average Glucose Result: 5.9 % (01-03-25 @ 05:40)  A1C with Estimated Average Glucose Result: 6.2 % (08-14-24 @ 05:10)

## 2025-01-09 NOTE — PROGRESS NOTE ADULT - PROBLEM SELECTOR PLAN 4
-A-fib  RVR on admission  -Does not seem to be on beta-blocker outpatient   -Per chart review, unclear if has A-fib in the past  -previous Echo with mod Tricuspid regurgitation, normal EF. G2 DD. moderate calcification of the mitral valve annulus. (Aug 2024)  -c/w Telemetry  -Card dr. Javier.   -c/w Eliquis 2.5mg BID,  lopressor 12.5mg BID.
-A-fib  RVR on admission  -Does not seem to be on beta-blocker outpatient   -Per chart review, unclear if has A-fib in the past  -previous Echo with mod Tricuspid regurgitation, normal EF. G2 DD. moderate calcification of the mitral valve annulus. (Aug 2024)  -c/w Telemetry  -Card dr. Javier.   -c/w Eliquis 2.5mg BID,  lopressor 12.5mg BID. Multaq
-A-fib  RVR on admission  -Does not seem to be on beta-blocker outpatient   -Per chart review, unclear if has A-fib in the past  -previous Echo with mod Tricuspid regurgitation, normal EF. G2 DD. moderate calcification of the mitral valve annulus. (Aug 2024)  -c/w Telemetry  -Card dr. Javier.   -c/w Eliquis 2.5mg BID,  lopressor 12.5mg BID. Multaq
-Na 127 on adm, lower leg edema  -Cr 1.30  -c/w IVF NS @50  -Nephro dr. Agarwal consulted.   -f/u urine study
-A-fib  RVR on admission  -Does not seem to be on beta-blocker outpatient   -Per chart review, unclear if has A-fib in the past  -previous Echo with mod Tricuspid regurgitation, normal EF. G2 DD. moderate calcification of the mitral valve annulus. (Aug 2024)  -c/w Telemetry  -Card dr. Javier.   -c/w Eliquis 2.5mg BID,  lopressor 12.5mg BID.

## 2025-01-09 NOTE — PROGRESS NOTE ADULT - PROBLEM SELECTOR PLAN 2
-Status post BiPAP in the ER, now saturating well on 2 L nasal cannula  -DuoNeb every 6 as needed  -Renally dosed Tamiflu for 5 days  -Supportive care  -Isolation precautions completed
-Status post BiPAP in the ER, now saturating well on 2 L nasal cannula  -DuoNeb every 6 as needed  -Renally dosed Tamiflu for 5 days  -Supportive care  -Isolation precautions
-See above  -Titrate Oxygen as tolerated  -ICS, OOB.
-Status post BiPAP in the ER, now saturating well on 2 L nasal cannula  -DuoNeb every 6 as needed  -Renally dosed Tamiflu for 5 days  -Supportive care  -Isolation precautions completed
-Status post BiPAP in the ER, now saturating well on 2 L nasal cannula  -DuoNeb every 6 as needed  -Renally dosed Tamiflu for 5 days  -Supportive care  -Isolation precautions completed

## 2025-01-09 NOTE — PHYSICAL THERAPY INITIAL EVALUATION ADULT - PATIENT/FAMILY AGREES WITH PLAN
This is a chronic problem for the patient that is ongoing.  The patient was being managed by PCP as well as a specialist for his low testosterone replacement for hypogonadism.  Patient states that he could no longer follow-up with a specialist due to co-pay cost and insurance not covering it.  Reports that he feels better when receiving testosterone replacement, specifically improved libido, improve strength, improve muscle mass.  He was previously receiving IM testosterone 200 mg every other week.   yes

## 2025-01-09 NOTE — PROGRESS NOTE ADULT - SUBJECTIVE AND OBJECTIVE BOX
Patient is seen and examined at the bed side, is afebrile. She mentioned feeling better.      REVIEW OF SYSTEMS: All other review systems are negative      ALLERGIES: No Known Allergies      Vital Signs Last 24 Hrs  T(C): 36.6 (2025 12:35), Max: 36.8 (2025 20:01)  T(F): 97.9 (2025 12:35), Max: 98.2 (2025 20:01)  HR: 61 (2025 12:35) (61 - 82)  BP: 128/65 (2025 12:35) (128/65 - 177/76)  BP(mean): 110 (2025 10:46) (89 - 110)  RR: 18 (2025 12:35) (18 - 18)  SpO2: 96% (2025 12:35) (94% - 97%)    Parameters below as of 2025 12:35  Patient On (Oxygen Delivery Method): room air        PHYSICAL EXAM:  GENERAL: Not in distress   CHEST/LUNG:  Air entry bilaterally  HEART: s1 and s2 present  ABDOMEN:  Nontender and  Nondistended  EXTREMITIES: No pedal  edema  CNS: Awake and Alert        LABS:                        12.8   6.31  )-----------( 410      ( 2025 05:18 )             38.8                           12.8   6.31  )-----------( 410      ( 2025 05:18 )             38.8              -    131[L]  |  102  |  21[H]  ----------------------------<  193[H]  4.8   |  23  |  0.66    Ca    8.8      2025 05:20      01-08    132[L]  |  101  |  18  ----------------------------<  177[H]  5.0   |  22  |  0.55    Ca    8.7      2025 05:18    TPro  8.2  /  Alb  3.7  /  TBili  0.7  /  DBili  x   /  AST  36  /  ALT  25  /  AlkPhos  239[H]  -        CAPILLARY BLOOD GLUCOSE  POCT Blood Glucose.: 91 mg/dL (2025 11:35)  POCT Blood Glucose.: 84 mg/dL (2025 07:59)  POCT Blood Glucose.: 110 mg/dL (2025 21:28)  POCT Blood Glucose.: 147 mg/dL (2025 18:25)  POCT Blood Glucose.: 175 mg/dL (2025 16:21)        Urinalysis Basic - ( 2025 08:05 )  Color: Yellow / Appearance: Clear / S.016 / pH: x  Gluc: x / Ketone: Trace mg/dL  / Bili: Negative / Urobili: 1.0 mg/dL   Blood: x / Protein: 100 mg/dL / Nitrite: Negative   Leuk Esterase: Moderate / RBC: 3 /HPF / WBC 4 /HPF   Sq Epi: x / Non Sq Epi: x / Bacteria: Few /HPF      MEDICATIONS  (STANDING):    albuterol/ipratropium for Nebulization 3 milliLiter(s) Nebulizer every 6 hours  apixaban 2.5 milliGRAM(s) Oral every 12 hours  atorvastatin 10 milliGRAM(s) Oral at bedtime  cefepime   IVPB      cefepime   IVPB 1000 milliGRAM(s) IV Intermittent every 8 hours  insulin lispro (ADMELOG) corrective regimen sliding scale   SubCutaneous three times a day before meals  insulin lispro (ADMELOG) corrective regimen sliding scale   SubCutaneous at bedtime  methylPREDNISolone sodium succinate Injectable 40 milliGRAM(s) IV Push every 12 hours  metoprolol tartrate 12.5 milliGRAM(s) Oral two times a day      RADIOLOGY & ADDITIONAL TESTS:    25 : CT Chest No Cont (25 @ 13:49) >Multifocal pneumonia. Trace right pleural effusion.    No acute findings in the abdomen and pelvis.      25: Xray Chest 1 View- PORTABLE-Urgent (25 @ 17:57) >    IMPRESSION: No acute cardiopulmonary disease process. Cardiomegaly.        MICROBIOLOGY DATA:    Legionella pneumophila Antigen, Urine (25 @ 21:30)   Legionella Antigen, Urine: Negative:    MRSA/MSSA PCR (25 @ 21:30)   MRSA PCR Result.: NotDetec:    Culture - Blood in AM (25 @ 06:58)   Specimen Source: .Blood BLOOD  Culture Results: No growth at 72 hours    Culture - Blood in AM (25 @ 06:50)   Specimen Source: .Blood BLOOD  Culture Results: No growth at 72 hours    Procalcitonin (25 @ 06:50)   Procalcitonin: 1.88    FluA/FluB/RSV/COVID PCR (25 @ 17:12)   SARS-CoV-2 Result: NotDete: This Respiratory Panel uses polymerase chain reaction (PCR) to detect for   influenza A; influenza B; and SARS-CoV-2.   This test was validated by [facility] and is in use under the FDA   Emergency Use Authorization (EUA) for clinical labs CLIA-certified to   perform high complexity testing. Test results should be correlated with   clinical presentation, patient history, and epidemiology.  Influenza A Result: Detected: notified dr annabel latham 2025 19:54:32 EST  Influenza B Result: NotDetec  Resp Syn Virus Result: NotDetec                     Patient is seen and examined at the bed side, is afebrile. She mentioned feeling better, saturating okay in room air.      REVIEW OF SYSTEMS: All other review systems are negative      ALLERGIES: No Known Allergies      Vital Signs Last 24 Hrs  T(C): 36.6 (09 Jan 2025 12:35), Max: 36.8 (08 Jan 2025 20:01)  T(F): 97.9 (09 Jan 2025 12:35), Max: 98.2 (08 Jan 2025 20:01)  HR: 61 (09 Jan 2025 12:35) (61 - 82)  BP: 128/65 (09 Jan 2025 12:35) (128/65 - 177/76)  BP(mean): 110 (09 Jan 2025 10:46) (89 - 110)  RR: 18 (09 Jan 2025 12:35) (18 - 18)  SpO2: 96% (09 Jan 2025 12:35) (94% - 97%)    Parameters below as of 09 Jan 2025 12:35  Patient On (Oxygen Delivery Method): room air      PHYSICAL EXAM:  GENERAL: Not in distress   CHEST/LUNG:  Air entry bilaterally  HEART: s1 and s2 present  ABDOMEN:  Nontender and  Nondistended  EXTREMITIES: No pedal  edema  CNS: Awake and Alert      LABS: No new Labs                         12.8   6.31  )-----------( 410      ( 08 Jan 2025 05:18 )             38.8                           12.8   6.31  )-----------( 410      ( 08 Jan 2025 05:18 )             38.8              01-09    131[L]  |  102  |  21[H]  ----------------------------<  193[H]  4.8   |  23  |  0.66    Ca    8.8      09 Jan 2025 05:20      01-08    132[L]  |  101  |  18  ----------------------------<  177[H]  5.0   |  22  |  0.55    Ca    8.7      08 Jan 2025 05:18    TPro  8.2  /  Alb  3.7  /  TBili  0.7  /  DBili  x   /  AST  36  /  ALT  25  /  AlkPhos  239[H]  01-02        CAPILLARY BLOOD GLUCOSE  POCT Blood Glucose.: 91 mg/dL (04 Jan 2025 11:35)  POCT Blood Glucose.: 84 mg/dL (04 Jan 2025 07:59)  POCT Blood Glucose.: 110 mg/dL (03 Jan 2025 21:28)  POCT Blood Glucose.: 147 mg/dL (03 Jan 2025 18:25)  POCT Blood Glucose.: 175 mg/dL (03 Jan 2025 16:21)        MEDICATIONS  (STANDING):    albuterol/ipratropium for Nebulization 3 milliLiter(s) Nebulizer every 6 hours  apixaban 2.5 milliGRAM(s) Oral every 12 hours  atorvastatin 10 milliGRAM(s) Oral at bedtime  cefepime   IVPB      cefepime   IVPB 1000 milliGRAM(s) IV Intermittent every 8 hours  insulin lispro (ADMELOG) corrective regimen sliding scale   SubCutaneous three times a day before meals  insulin lispro (ADMELOG) corrective regimen sliding scale   SubCutaneous at bedtime  methylPREDNISolone sodium succinate Injectable 40 milliGRAM(s) IV Push every 12 hours  metoprolol tartrate 12.5 milliGRAM(s) Oral two times a day      RADIOLOGY & ADDITIONAL TESTS:    1/6/25 : CT Chest No Cont (01.06.25 @ 13:49) >Multifocal pneumonia. Trace right pleural effusion.    No acute findings in the abdomen and pelvis.      1/2/25: Xray Chest 1 View- PORTABLE-Urgent (01.02.25 @ 17:57) >    IMPRESSION: No acute cardiopulmonary disease process. Cardiomegaly.        MICROBIOLOGY DATA:    Legionella pneumophila Antigen, Urine (01.05.25 @ 21:30)   Legionella Antigen, Urine: Negative:    MRSA/MSSA PCR (01.05.25 @ 21:30)   MRSA PCR Result.: NotDetec:    Culture - Blood in AM (01.05.25 @ 06:58)   Specimen Source: .Blood BLOOD  Culture Results: No growth at 4 days    Culture - Blood in AM (01.05.25 @ 06:50)   Specimen Source: .Blood BLOOD  Culture Results: No growth at 4 days     Procalcitonin (01.05.25 @ 06:50)   Procalcitonin: 1.88    FluA/FluB/RSV/COVID PCR (01.02.25 @ 17:12)   SARS-CoV-2 Result: NotDetec: This Respiratory Panel uses polymerase chain reaction (PCR) to detect for   influenza A; influenza B; and SARS-CoV-2.   This test was validated by [facility] and is in use under the FDA   Emergency Use Authorization (EUA) for clinical labs CLIA-certified to   perform high complexity testing. Test results should be correlated with   clinical presentation, patient history, and epidemiology.  Influenza A Result: Detected: notified dr annabel latham 01/02/2025 19:54:32 EST  Influenza B Result: NotDetec  Resp Syn Virus Result: NotDetec

## 2025-01-09 NOTE — PROGRESS NOTE ADULT - PROBLEM SELECTOR PROBLEM 2
Acute hypoxic respiratory failure
Type A influenza

## 2025-01-09 NOTE — DIETITIAN INITIAL EVALUATION ADULT - NS FNS DIET ORDER
Diet, Minced and Moist:   Gastroesophageal Reflux Disease  No Fish (01-08-25 @ 13:07) [Active]

## 2025-01-09 NOTE — PROGRESS NOTE ADULT - SUBJECTIVE AND OBJECTIVE BOX
NP Note discussed with  primary attending    Patient is a 89y old  Female who presents with a chief complaint of Influenza due to unidentified influenza virus with other respiratory manifestation     (09 Jan 2025 15:46)      INTERVAL HPI/OVERNIGHT EVENTS: no new complaints    MEDICATIONS  (STANDING):  albuterol/ipratropium for Nebulization 3 milliLiter(s) Nebulizer every 6 hours  apixaban 2.5 milliGRAM(s) Oral every 12 hours  atorvastatin 10 milliGRAM(s) Oral at bedtime  cefepime   IVPB      cefepime   IVPB 1000 milliGRAM(s) IV Intermittent every 8 hours  insulin lispro (ADMELOG) corrective regimen sliding scale   SubCutaneous three times a day before meals  insulin lispro (ADMELOG) corrective regimen sliding scale   SubCutaneous at bedtime  methylPREDNISolone sodium succinate Injectable 40 milliGRAM(s) IV Push every 12 hours  metoprolol tartrate 12.5 milliGRAM(s) Oral two times a day    MEDICATIONS  (PRN):  acetaminophen     Tablet .. 650 milliGRAM(s) Oral every 6 hours PRN Temp greater or equal to 38C (100.4F), Mild Pain (1 - 3)  Biotene Dry Mouth Oral Rinse 15 milliLiter(s) Swish and Spit every 4 hours PRN Mouth Care  melatonin 3 milliGRAM(s) Oral at bedtime PRN Insomnia  ondansetron Injectable 4 milliGRAM(s) IV Push every 8 hours PRN Nausea and/or Vomiting      __________________________________________________  REVIEW OF SYSTEMS:    CONSTITUTIONAL: No fever,   EYES: no acute visual disturbances  NECK: No pain or stiffness  RESPIRATORY: No cough; No shortness of breath  CARDIOVASCULAR: No chest pain, no palpitations  GASTROINTESTINAL: No pain. No nausea or vomiting; No diarrhea   NEUROLOGICAL: No headache or numbness, no tremors  MUSCULOSKELETAL: No joint pain, no muscle pain  GENITOURINARY: no dysuria, no frequency, no hesitancy  PSYCHIATRY: no depression , no anxiety  ALL OTHER  ROS negative        Vital Signs Last 24 Hrs  T(C): 36.6 (09 Jan 2025 12:35), Max: 36.8 (08 Jan 2025 20:01)  T(F): 97.9 (09 Jan 2025 12:35), Max: 98.2 (08 Jan 2025 20:01)  HR: 61 (09 Jan 2025 12:35) (61 - 82)  BP: 128/65 (09 Jan 2025 12:35) (128/65 - 177/76)  BP(mean): 110 (09 Jan 2025 10:46) (89 - 110)  RR: 18 (09 Jan 2025 12:35) (18 - 18)  SpO2: 96% (09 Jan 2025 12:35) (94% - 97%)    Parameters below as of 09 Jan 2025 12:35  Patient On (Oxygen Delivery Method): room air        ________________________________________________  PHYSICAL EXAM:  GENERAL: NAD  HEENT: Normocephalic;  conjunctivae and sclerae clear; moist mucous membranes;   NECK : supple  CHEST/LUNG: Clear to ausculitation bilaterally with good air entry   HEART: S1 S2  regular; no murmurs, gallops or rubs  ABDOMEN: Soft, Nontender, Nondistended; Bowel sounds present  EXTREMITIES: no cyanosis; no edema; no calf tenderness  SKIN: warm and dry; no rash  NERVOUS SYSTEM:  Awake and alert; Oriented  to place, person and time ; no new deficits    _________________________________________________  LABS:                        12.8   6.31  )-----------( 410      ( 08 Jan 2025 05:18 )             38.8     01-09    131[L]  |  102  |  21[H]  ----------------------------<  193[H]  4.8   |  23  |  0.66    Ca    8.8      09 Jan 2025 05:20        Urinalysis Basic - ( 09 Jan 2025 05:20 )    Color: x / Appearance: x / SG: x / pH: x  Gluc: 193 mg/dL / Ketone: x  / Bili: x / Urobili: x   Blood: x / Protein: x / Nitrite: x   Leuk Esterase: x / RBC: x / WBC x   Sq Epi: x / Non Sq Epi: x / Bacteria: x      CAPILLARY BLOOD GLUCOSE      POCT Blood Glucose.: 204 mg/dL (09 Jan 2025 11:37)  POCT Blood Glucose.: 201 mg/dL (09 Jan 2025 07:41)  POCT Blood Glucose.: 225 mg/dL (08 Jan 2025 21:26)        RADIOLOGY & ADDITIONAL TESTS:  < from: CT Abdomen and Pelvis No Cont (01.06.25 @ 13:50) >  IMPRESSION:  Multifocal pneumonia. Trace right pleural effusion.    No acute findings in the abdomen and pelvis.        < end of copied text >    Imaging Personally Reviewed:  YES    Consultant(s) Notes Reviewed:   YES    Care Discussed with Consultants :     Plan of care was discussed with patient and /or primary care giver; all questions and concerns were addressed and care was aligned with patient's wishes.

## 2025-01-09 NOTE — PROGRESS NOTE ADULT - PROBLEM SELECTOR PLAN 1
CXR 1/2-->No acute cardiopulmonary disease process. Cardiomegaly.  Chest CT 1/6-->Multifocal pneumonia. Trace right pleural effusion.  Blood/urine cultures negative  ID Dr. Tovar following  Currently on Ceftriaxone  Mild leukocytosis, afbrile
CXR 1/2-->No acute cardiopulmonary disease process. Cardiomegaly.  Chest CT 1/6-->Multifocal pneumonia. Trace right pleural effusion.  Blood/urine cultures negative  ID Dr. Tovar following  Currently on Ceftriaxone  Mild leukocytosis, afbrile
-Status post BiPAP in the ER, now saturating well on 2 L nasal cannula  -s/p decadron  -No indication for antibiotics currently, no sign of bacterial superimposition  -DuoNeb every 6 as needed  -Renally dosed Tamiflu for 5 days  -Supportive care  -Isolation precautions
CXR 1/2-->No acute cardiopulmonary disease process. Cardiomegaly.  Chest CT 1/6-->Multifocal pneumonia. Trace right pleural effusion.  Blood/urine cultures negative  Ceftriaxone switched to Cefepime  Start IV steroids 40mg qd  Mild leukocytosis, afebrile  ID Dr. Tovar following
CXR 1/2-->No acute cardiopulmonary disease process. Cardiomegaly.  Chest CT 1/6-->Multifocal pneumonia. Trace right pleural effusion.  Blood/urine cultures negative  Ceftriaxone switched to Cefepime  Start IV steroids 40mg qd  Mild leukocytosis, afebrile  ID Dr. Tovar following

## 2025-01-09 NOTE — PROGRESS NOTE ADULT - PROBLEM SELECTOR PROBLEM 1
Multifocal pneumonia
Type A influenza
Multifocal pneumonia

## 2025-01-09 NOTE — PHYSICAL THERAPY INITIAL EVALUATION ADULT - PERTINENT HX OF CURRENT PROBLEM, REHAB EVAL
89 yr old femaleFrom home, ambulatory and independent, with hx of HTN, DM, HLD, CHF, CVA (> 15 years on plavix) presents c/o cough x 4 days and sob on day of admission.  In the emergency room she was started on BiPAP  Because of increased work of breathing and respiratory rate to 35, but did not tolerate it well. She was then transitioned to nasal cannula and is saturating well on 2 L.  She was also found to be in Afib with RVR to 193.  She was given Lopressor and her rate improve. She reports some shortness of breath and dizziness but is otherwise doing well.  She tested positive for influenza A.  She will be admitted for influenza requiring oxygen support. IMPRESSION: No laryngeal penetration or aspiration. IMPRESSION: Multifocal pneumonia. Trace right pleural effusion. IMPRESSION: No acute cardiopulmonary disease process. Cardiomegaly. 89 yr old female From home, ambulatory and independent, with hx of HTN, DM, HLD, CHF, CVA (> 15 years on plavix) presents c/o cough x 4 days and sob on day of admission.  In the emergency room she was started on BiPAP  Because of increased work of breathing and respiratory rate to 35, but did not tolerate it well. She was then transitioned to nasal cannula and is saturating well on 2 L.  She was also found to be in Afib with RVR to 193.  She was given Lopressor and her rate improve. She reports some shortness of breath and dizziness but is otherwise doing well.  She tested positive for influenza A.  She will be admitted for influenza requiring oxygen support. IMPRESSION: No laryngeal penetration or aspiration. IMPRESSION: Multifocal pneumonia. Trace right pleural effusion. IMPRESSION: No acute cardiopulmonary disease process. Cardiomegaly.

## 2025-01-09 NOTE — PROGRESS NOTE ADULT - PROBLEM SELECTOR PROBLEM 3
Acute hypoxic respiratory failure
Acute hypoxic respiratory failure
PAF (paroxysmal atrial fibrillation)
Acute hypoxic respiratory failure
Acute hypoxic respiratory failure

## 2025-01-09 NOTE — PROGRESS NOTE ADULT - PROBLEM SELECTOR PLAN 10
DVT ppx- Eliquis for afib
DVT ppx-Eliquis.
From home  c/w Tamiflu, Titrate Oxygen as tolerated.   c/w telemetry
DVT ppx-Eliquis.
DVT ppx- Eliquis for afib

## 2025-01-09 NOTE — PROGRESS NOTE ADULT - PROBLEM SELECTOR PROBLEM 8
Congestive heart failure (CHF)
History of CVA in adulthood
Congestive heart failure (CHF)

## 2025-01-09 NOTE — PROGRESS NOTE ADULT - PROBLEM SELECTOR PLAN 8
-takes Plavix at home, Dc'd  -c/w Eliquis per cardiology
-takes torsemide at home  -Hold diuretic in setting of LYNN  -EF 56% in August 2024  -Appears euvolemic on exam

## 2025-01-09 NOTE — PROGRESS NOTE ADULT - PROBLEM SELECTOR PLAN 6
-c/w Cozaar with holding parameter, discontinue Norvasc and Demdex per cardiology.   -c/w Multaq and low dose BB  -monitor BP/HR
-Takes Jardiance at home  -A1c was 6.2 in August 2024  -Insulin sliding scale  -repeat  A1c 5.9  -Diabetic diet.

## 2025-01-09 NOTE — PROGRESS NOTE ADULT - SUBJECTIVE AND OBJECTIVE BOX
Date of Service 01-09-25 @ 11:36    CHIEF COMPLAINT:Patient is a 89y old  Female who presents with a chief complaint of influenza.Pt appears comfortable.  	  REVIEW OF SYSTEMS:  CONSTITUTIONAL: No fever, weight loss, or fatigue  EYES: No eye pain, visual disturbances, or discharge  ENT:  No difficulty hearing, tinnitus, vertigo; No sinus or throat pain  NECK: No pain or stiffness  RESPIRATORY: No cough, wheezing, chills or hemoptysis; No Shortness of Breath  CARDIOVASCULAR: No chest pain, palpitations, passing out, dizziness, or leg swelling  GASTROINTESTINAL: No abdominal or epigastric pain. No nausea, vomiting, or hematemesis; No diarrhea or constipation. No melena or hematochezia.  GENITOURINARY: No dysuria, frequency, hematuria, or incontinence  NEUROLOGICAL: No headaches, memory loss, loss of strength, numbness, or tremors  SKIN: No itching, burning, rashes, or lesions   LYMPH Nodes: No enlarged glands  ENDOCRINE: No heat or cold intolerance; No hair loss  MUSCULOSKELETAL: No joint pain or swelling; No muscle, back, or extremity pain  PSYCHIATRIC: No depression, anxiety, mood swings, or difficulty sleeping  HEME/LYMPH: No easy bruising, or bleeding gums  ALLERGY AND IMMUNOLOGIC: No hives or eczema	        PHYSICAL EXAM:  T(C): 36.6 (01-09-25 @ 04:55), Max: 36.8 (01-08-25 @ 20:01)  HR: 66 (01-09-25 @ 10:46) (62 - 82)  BP: 177/76 (01-09-25 @ 10:46) (130/73 - 177/76)  RR: 18 (01-09-25 @ 04:55) (18 - 18)  SpO2: 97% (01-09-25 @ 10:46) (93% - 97%)  Wt(kg): --  I&O's Summary    08 Jan 2025 07:01  -  09 Jan 2025 07:00  --------------------------------------------------------  IN: 0 mL / OUT: 500 mL / NET: -500 mL        Appearance: Normal	  HEENT:   Normal oral mucosa, PERRL, EOMI	  Lymphatic: No lymphadenopathy  Cardiovascular: Normal S1 S2, No JVD, No murmurs, No edema  Respiratory: B/L [facility]  Psychiatry: A & O x 3, Mood & affect appropriate  Gastrointestinal:  Soft, Non-tender, + BS	  Skin: No rashes, No ecchymoses, No cyanosis	  Neurologic: Non-focal  Extremities: Normal range of motion, No clubbing, cyanosis or edema  Vascular: Peripheral pulses palpable 2+ bilaterally    MEDICATIONS  (STANDING):  albuterol/ipratropium for Nebulization 3 milliLiter(s) Nebulizer every 6 hours  apixaban 2.5 milliGRAM(s) Oral every 12 hours  atorvastatin 10 milliGRAM(s) Oral at bedtime  cefepime   IVPB      cefepime   IVPB 1000 milliGRAM(s) IV Intermittent every 8 hours  insulin lispro (ADMELOG) corrective regimen sliding scale   SubCutaneous three times a day before meals  insulin lispro (ADMELOG) corrective regimen sliding scale   SubCutaneous at bedtime  methylPREDNISolone sodium succinate Injectable 40 milliGRAM(s) IV Push every 12 hours  metoprolol tartrate 12.5 milliGRAM(s) Oral two times a day        	  LABS:	 	                        12.8   6.31  )-----------( 410      ( 08 Jan 2025 05:18 )             38.8     01-09    131[L]  |  102  |  21[H]  ----------------------------<  193[H]  4.8   |  23  |  0.66    Ca    8.8      09 Jan 2025 05:20

## 2025-01-09 NOTE — PHYSICAL THERAPY INITIAL EVALUATION ADULT - ACTIVE RANGE OF MOTION EXAMINATION, REHAB EVAL
BUE shoulder flexion limited d/t pain (95 degrees), knee flexion limited d/t pain (45 degrees)/bilateral upper extremity Active ROM was WFL (within functional limits)/bilateral  lower extremity Active ROM was WFL (within functional limits) BUE shoulder flexion limited d/t pain (95 degrees), knee flexion limited d/t pain (45 degrees)/bilateral upper extremity Active ROM was WFL (within functional limits)/bilateral  lower extremity Active ROM was WFL (within functional limits)/deficits as listed below

## 2025-01-09 NOTE — PHYSICAL THERAPY INITIAL EVALUATION ADULT - PASSIVE RANGE OF MOTION EXAMINATION, REHAB EVAL
active meets passive. limited d/t pain./bilateral upper extremity Passive ROM was WFL (within functional limits)/bilateral lower extremity Passive ROM was WFL (within functional limits)

## 2025-01-09 NOTE — PROGRESS NOTE ADULT - PROBLEM SELECTOR PLAN 9
-takes Plavix at home, Dc'd  -c/w Eliquis and statin per cardiology
-takes Plavix at home, Dc'd  -c/w Eliquis and statin per cardiology
DVT ppx-Eliquis.
-takes Plavix at home, Dc'd  -c/w Eliquis and statin per cardiology
-takes Plavix at home, Dc'd  -c/w Eliquis and statin per cardiology

## 2025-01-09 NOTE — PROGRESS NOTE ADULT - PROBLEM SELECTOR PROBLEM 9
History of CVA in adulthood
Prophylactic measure
History of CVA in adulthood

## 2025-01-09 NOTE — DIETITIAN INITIAL EVALUATION ADULT - OTHER INFO
Patient was admitted with Influenza A, rectal mass, high cholesterol, CVA, h/o htn, Diabetes, mutifocul pneumonia, hyponatremia, PAF.

## 2025-01-09 NOTE — PROGRESS NOTE ADULT - SUBJECTIVE AND OBJECTIVE BOX
[   ] ICU                                          [   ] CCU                                      [ X  ] Medical Floor    Patient is a 89 year old female with diarrhea. GI consulted to evaluate.         89 year old female from home, ambulatory and independent, with past medical history significant for HTN, DM, CHF, CVA, Rectal mass, Iron deficiency anemia, Hyperlipidemia, presented to the emergency room with 4 days history of SOB and cough. Patient was admitted with influenza. During the hospital course patient developed watery non bloody diarrhea. No abdominal pain, nausea, vomiting, hematemesis, hematochezia, melena, fever, chills, chest pain, SOB, cough, hematuria, dysuria, recent antibiotics intake or travelling reported.       Patient appears comfortable. No new complaints reported, No abdominal pain, N/V, hematemesis, hematochezia, melena, fever, chills, chest pain, SOB, cough or diarrhea reported.           PAIN MANAGEMENT:  Pain Scale:                 0/10  Pain Location:         PAST MEDICAL HISTORY    DM    HTN    CVA    High cholesterol    Iron (Fe) deficiency anemia    Rectal mass    Congestive heart failure (CHF)    Diverticulosis    Jejunum Diverticulom             PAST SURGICAL HISTORY    No significant past surgical history reported        Allergies    No Known Allergies    Intolerances  None        SOCIAL HISTORY  Advanced Directives:       [ X ] Full Code       [  ] DNR  Marital Status:         [  ] M      [ X ] S      [  ] D       [  ] W  Children:       [ X ] Yes      [  ] No  Occupation:        [  ] Employed       [ X ] Unemployed       [  ] Retired  Diet:       [ X ] Regular       [  ] PEG feeding          [  ] NG tube feeding  Drug Use:           [ X ] Patient denied          [  ] Yes  Alcohol:           [ X ] No             [  ] Yes (socially)         [  ] Yes (chronic)  Tobacco:           [  ] Yes           [ X ] No      FAMILY HISTORY  [X  ] Heart Disease            [ X ] Diabetes             [X  ] HTN             [  ] Colon Cancer             [  ] Stomach Cancer              [  ] Pancreatic Cancer          VITALS   Vital Signs Last 24 Hrs  T(C): 36.6 (01-09-25 @ 12:35), Max: 36.8 (01-08-25 @ 20:01)  T(F): 97.9 (01-09-25 @ 12:35), Max: 98.2 (01-08-25 @ 20:01)  HR: 61 (01-09-25 @ 12:35) (61 - 82)  BP: 128/65 (01-09-25 @ 12:35) (128/65 - 177/76)  BP(mean): 110 (01-09-25 @ 10:46) (89 - 110)  RR: 18 (01-09-25 @ 12:35) (18 - 18)  SpO2: 96% (01-09-25 @ 12:35) (94% - 97%)        MEDICATIONS  (STANDING):  albuterol/ipratropium for Nebulization 3 milliLiter(s) Nebulizer every 6 hours  apixaban 2.5 milliGRAM(s) Oral every 12 hours  atorvastatin 10 milliGRAM(s) Oral at bedtime  cefepime   IVPB      cefepime   IVPB 1000 milliGRAM(s) IV Intermittent every 8 hours  insulin lispro (ADMELOG) corrective regimen sliding scale   SubCutaneous three times a day before meals  insulin lispro (ADMELOG) corrective regimen sliding scale   SubCutaneous at bedtime  methylPREDNISolone sodium succinate Injectable 40 milliGRAM(s) IV Push every 12 hours  metoprolol tartrate 12.5 milliGRAM(s) Oral two times a day    MEDICATIONS  (PRN):  acetaminophen     Tablet .. 650 milliGRAM(s) Oral every 6 hours PRN Temp greater or equal to 38C (100.4F), Mild Pain (1 - 3)  Biotene Dry Mouth Oral Rinse 15 milliLiter(s) Swish and Spit every 4 hours PRN Mouth Care  melatonin 3 milliGRAM(s) Oral at bedtime PRN Insomnia  ondansetron Injectable 4 milliGRAM(s) IV Push every 8 hours PRN Nausea and/or Vomiting                            12.8   6.31  )-----------( 410      ( 08 Jan 2025 05:18 )             38.8       01-09    131[L]  |  102  |  21[H]  ----------------------------<  193[H]  4.8   |  23  |  0.66    Ca    8.8      09 Jan 2025 05:20           normal...

## 2025-01-09 NOTE — PROGRESS NOTE ADULT - PROBLEM SELECTOR PROBLEM 10
Discharge planning issues
Prophylactic measure

## 2025-01-09 NOTE — PROGRESS NOTE ADULT - PROBLEM SELECTOR PLAN 5
-Takes Jardiance at home  -A1c was 6.2 in August 2024  -Insulin sliding scale  -repeat  A1c 5.9  -Diabetic diet.
-Na 127 on adm, lower leg edema -RERSOLVED  -Cr 1.30  -S/p IVF NS @50  -Nephro dr. Agarwal consulted.   -f/u urine study  -Na 131 today (1/9)
-Na 127 on adm, lower leg edema-RERSOLVED  -Cr 1.30  -c/w IVF NS @50  -Nephro dr. Agarwal consulted.   -f/u urine study  -Na 136 today (1/6)
-Na 127 on adm, lower leg edema -RERSOLVED  -Cr 1.30  -S/p IVF NS @50  -Nephro dr. Agarwal consulted.   -f/u urine study  -Na 132 today (1/8)
-Na 127 on adm, lower leg edema-RERSOLVED  -Cr 1.30  -c/w IVF NS @50  -Nephro dr. Agarwal consulted.   -f/u urine study  -Na 136 today (1/6)

## 2025-01-09 NOTE — DIETITIAN INITIAL EVALUATION ADULT - PROBLEM SELECTOR PLAN 1
Status post BiPAP in the ER, now saturating well on 2 L nasal cannula  s/p decadron  No indication for antibiotics currently, no sign of bacterial superimposition  DuoNeb every 6 as needed  Renally dosed Tamiflu for 5 days  Supportive care  Isolation precautions

## 2025-01-09 NOTE — PROGRESS NOTE ADULT - PROBLEM SELECTOR PLAN 7
-c/w Cozaar with holding parameter, discontinue Norvasc and Demdex per cardiology.   -c/w low dose BB bid  -monitor BP/HR
-c/w Cozaar with holding parameter, discontinue Norvasc and Demdex per cardiology.   -c/w low dose BB bid  -monitor BP/HR
-c/w Cozaar with holding parameter, discontinue Norvasc and Demdex per cardiology.   -c/w Multaq and low dose BB  -monitor BP/HR
-takes torsemide at home  -Hold diuretic in setting of LYNN  -EF 56% in August 2024  -Appears euvolemic on exam
-c/w Cozaar with holding parameter, discontinue Norvasc and Demdex per cardiology.   -c/w Multaq and low dose BB  -monitor BP/HR

## 2025-01-09 NOTE — PHYSICAL THERAPY INITIAL EVALUATION ADULT - FUNCTIONAL LIMITATIONS, PT EVAL
0745: Bedside and Verbal shift change report given to Henny Grullon RN and Rafal RN (oncoming nurse) by [facility] (offgoing nurse). Report included the following information SBAR, Kardex, MAR, Recent Results and Cardiac Rhythm NSR. 
 
0800: Shift assessment completed; details documented in flow sheet. Pt resting quietly on RA; clear but diminished lung sounds; A&Ox1. Incomprehensible speech. Sinus tach but stable BP; NPO- NGT at 68 cm. Generalized edema. No signs of distress at this time; will continue to monitor. 1000: TRANSFER - OUT REPORT: 
 
Verbal report given to RN(name) on Daybranden Tanner  being transferred to [facility]) for routine progression of care Report consisted of patients Situation, Background, Assessment and  
Recommendations(SBAR). Information from the following report(s) SBAR, Kardex, Intake/Output, MAR, Recent Results and Cardiac Rhythm Sinus Tach was reviewed with the receiving nurse. Lines:  
Quad Lumen 02/14/20 Anterior; Left Neck (Active) Central Line Being Utilized Yes 2/23/2020  8:00 AM  
Criteria for Appropriate Use Limited/no vessel suitable for conventional peripheral access 2/23/2020  8:00 AM  
Site Assessment Clean, dry, & intact 2/23/2020  8:00 AM  
Infiltration Assessment 0 2/23/2020  8:00 AM  
Affected Extremity/Extremities Color distal to insertion site pink (or appropriate for race); Pulses palpable 2/23/2020  8:00 AM  
Date of Last Dressing Change 02/22/20 2/23/2020  8:00 AM  
Dressing Status Clean, dry, & intact 2/23/2020  8:00 AM  
Dressing Type Disk with Chlorhexadine gluconate (CHG); Transparent 2/23/2020  8:00 AM  
Action Taken Open ports on tubing capped 2/23/2020  8:00 AM  
Proximal Hub Color/Line Status White;Capped;Flushed 2/23/2020  8:00 AM  
Positive Blood Return (Medial Site) Yes 2/23/2020  8:00 AM  
Medial 1 Hub Color/Line Status Stephania Farmer; Infusing 2/23/2020  8:00 AM  
Positive Blood Return (Lateral Site) Yes 2/23/2020  8:00 AM  
 Medial 2 Hub Color/Line Status Blue;Capped;Flushed 2/23/2020  8:00 AM  
Positive Blood Return (Site #3) No 2/23/2020  8:00 AM  
Distal Hub Color/Line Status Brown;Capped 2/23/2020  8:00 AM  
Positive Blood Return (Site #4) Yes 2/23/2020  8:00 AM  
Alcohol Cap Used Yes 2/23/2020  8:00 AM  
  
 
Opportunity for questions and clarification was provided. Patient transported with: 
 Monitor Patient-specific medications from Pharmacy Registered Nurse Tech community/leisure

## 2025-01-09 NOTE — DIETITIAN INITIAL EVALUATION ADULT - ETIOLOGY
Altered Nutrition Related Lab Values evidenced by elevated Glu, POCT Glu and BUN, low Na related to Dx of DM and hyponatremia.

## 2025-01-09 NOTE — PHYSICAL THERAPY INITIAL EVALUATION ADULT - CRITERIA FOR SKILLED THERAPEUTIC INTERVENTIONS
anticipated discharge recommendation impairments found/functional limitations in following categories/risk reduction/prevention/rehab potential/therapy frequency/predicted duration of therapy intervention/anticipated discharge recommendation

## 2025-01-09 NOTE — PROGRESS NOTE ADULT - PROBLEM SELECTOR PLAN 3
2/2 PNA and Influenza  -Titrate Oxygen as tolerated  -OOB. pulmonary toileting
-EKG with A-fib plus RVR rhythm to 193 in the emergency room  -Does not seem to be on beta-blocker outpatient   -Per chart review, unclear if has A-fib in the past  -previous Echo with mod Tricuspid regurgitation, normal EF. G2 DD. moderate calcification of the mitral valve annulus. (Aug 2024)  -c/w Telemetry  -Card dr. Javier.   -c/w Eliquis 2.5mg BID,  lopressor 12.5mg BID. Multaq
2/2 PNA and Influenza  -Titrate Oxygen as tolerated  -OOB. pulmonary toileting

## 2025-01-09 NOTE — PROGRESS NOTE ADULT - ASSESSMENT
89 yr old female From home, ambulatory and independent, with hx of HTN, DM, HLD, CHF, CVA (> 15 years on plavix) presents c/o cough x 4 days and sob on day of admission,Flu+,PAF with RVR,hyponatremia,multifocal pneumonia.  1.Flu-s/p Tamiflu.  2.PAF-eliquis,low dose b blocker.  3.CVA-eliquis,statin.  4.Hyponatremia-Renal f/u.  5.HTN- off cozaar due to soft bp.  6.DM-Insulin.  7.Lipid d/o-statin.  8.PPI.  9.Multifocal pneumonia-abx,steroids tspper.ID f/u.  10.PT rec home PT.

## 2025-01-09 NOTE — PHYSICAL THERAPY INITIAL EVALUATION ADULT - GENERAL OBSERVATIONS, REHAB EVAL
Pt is found lying supine in bed with HOB elevated. Pt is [place], A&Ox4, and cooperative to PT treatment.

## 2025-01-09 NOTE — DIETITIAN INITIAL EVALUATION ADULT - ORAL INTAKE PTA/DIET HISTORY
Patient was seen at bedside. She was not able to provide information for the assessment. Chart was reviewed to obtain information.

## 2025-01-09 NOTE — DIETITIAN INITIAL EVALUATION ADULT - ENERGY INTAKE
Fair (50-75%) According to her appearance, may not be adequate. Flow sheets indicate variable intake.

## 2025-01-09 NOTE — PROGRESS NOTE ADULT - PROBLEM SELECTOR PLAN 11
From home  c/w Tamiflu, Titrate Oxygen as tolerated.   c/w telemetry  f/u PT reccs
From home  c/w Tamiflu, Titrate Oxygen as tolerated.   c/w telemetry  f/u PT reccs
From home  PT recs Home PT  Still wheezing, will carina IV steroids in AM
From home  c/w Tamiflu, Titrate Oxygen as tolerated.   c/w telemetry  f/u PT reccs

## 2025-01-09 NOTE — DIETITIAN INITIAL EVALUATION ADULT - PERTINENT MEDS FT
MEDICATIONS  (STANDING):  albuterol/ipratropium for Nebulization 3 milliLiter(s) Nebulizer every 6 hours  apixaban 2.5 milliGRAM(s) Oral every 12 hours  atorvastatin 10 milliGRAM(s) Oral at bedtime  cefepime   IVPB      cefepime   IVPB 1000 milliGRAM(s) IV Intermittent every 8 hours  insulin lispro (ADMELOG) corrective regimen sliding scale   SubCutaneous three times a day before meals  insulin lispro (ADMELOG) corrective regimen sliding scale   SubCutaneous at bedtime  methylPREDNISolone sodium succinate Injectable 40 milliGRAM(s) IV Push every 12 hours  metoprolol tartrate 12.5 milliGRAM(s) Oral two times a day    MEDICATIONS  (PRN):  acetaminophen     Tablet .. 650 milliGRAM(s) Oral every 6 hours PRN Temp greater or equal to 38C (100.4F), Mild Pain (1 - 3)  Biotene Dry Mouth Oral Rinse 15 milliLiter(s) Swish and Spit every 4 hours PRN Mouth Care  melatonin 3 milliGRAM(s) Oral at bedtime PRN Insomnia  ondansetron Injectable 4 milliGRAM(s) IV Push every 8 hours PRN Nausea and/or Vomiting

## 2025-01-10 VITALS — SYSTOLIC BLOOD PRESSURE: 176 MMHG | DIASTOLIC BLOOD PRESSURE: 86 MMHG

## 2025-01-10 LAB
ANION GAP SERPL CALC-SCNC: 7 MMOL/L — SIGNIFICANT CHANGE UP (ref 5–17)
BUN SERPL-MCNC: 26 MG/DL — HIGH (ref 7–18)
CALCIUM SERPL-MCNC: 8.9 MG/DL — SIGNIFICANT CHANGE UP (ref 8.4–10.5)
CHLORIDE SERPL-SCNC: 102 MMOL/L — SIGNIFICANT CHANGE UP (ref 96–108)
CO2 SERPL-SCNC: 23 MMOL/L — SIGNIFICANT CHANGE UP (ref 22–31)
CREAT SERPL-MCNC: 0.75 MG/DL — SIGNIFICANT CHANGE UP (ref 0.5–1.3)
CULTURE RESULTS: SIGNIFICANT CHANGE UP
CULTURE RESULTS: SIGNIFICANT CHANGE UP
EGFR: 75 ML/MIN/1.73M2 — SIGNIFICANT CHANGE UP
GLUCOSE BLDC GLUCOMTR-MCNC: 195 MG/DL — HIGH (ref 70–99)
GLUCOSE BLDC GLUCOMTR-MCNC: 204 MG/DL — HIGH (ref 70–99)
GLUCOSE SERPL-MCNC: 224 MG/DL — HIGH (ref 70–99)
HCT VFR BLD CALC: 37.4 % — SIGNIFICANT CHANGE UP (ref 34.5–45)
HGB BLD-MCNC: 12.4 G/DL — SIGNIFICANT CHANGE UP (ref 11.5–15.5)
MCHC RBC-ENTMCNC: 28.9 PG — SIGNIFICANT CHANGE UP (ref 27–34)
MCHC RBC-ENTMCNC: 33.2 G/DL — SIGNIFICANT CHANGE UP (ref 32–36)
MCV RBC AUTO: 87.2 FL — SIGNIFICANT CHANGE UP (ref 80–100)
NRBC # BLD: 0 /100 WBCS — SIGNIFICANT CHANGE UP (ref 0–0)
PLATELET # BLD AUTO: 584 K/UL — HIGH (ref 150–400)
POTASSIUM SERPL-MCNC: 5.1 MMOL/L — SIGNIFICANT CHANGE UP (ref 3.5–5.3)
POTASSIUM SERPL-SCNC: 5.1 MMOL/L — SIGNIFICANT CHANGE UP (ref 3.5–5.3)
RBC # BLD: 4.29 M/UL — SIGNIFICANT CHANGE UP (ref 3.8–5.2)
RBC # FLD: 13.2 % — SIGNIFICANT CHANGE UP (ref 10.3–14.5)
SODIUM SERPL-SCNC: 132 MMOL/L — LOW (ref 135–145)
SPECIMEN SOURCE: SIGNIFICANT CHANGE UP
SPECIMEN SOURCE: SIGNIFICANT CHANGE UP
WBC # BLD: 7.32 K/UL — SIGNIFICANT CHANGE UP (ref 3.8–10.5)
WBC # FLD AUTO: 7.32 K/UL — SIGNIFICANT CHANGE UP (ref 3.8–10.5)

## 2025-01-10 PROCEDURE — 84145 PROCALCITONIN (PCT): CPT

## 2025-01-10 PROCEDURE — 83036 HEMOGLOBIN GLYCOSYLATED A1C: CPT

## 2025-01-10 PROCEDURE — 84300 ASSAY OF URINE SODIUM: CPT

## 2025-01-10 PROCEDURE — 71250 CT THORAX DX C-: CPT | Mod: MC

## 2025-01-10 PROCEDURE — 82962 GLUCOSE BLOOD TEST: CPT

## 2025-01-10 PROCEDURE — 0241U: CPT

## 2025-01-10 PROCEDURE — 87040 BLOOD CULTURE FOR BACTERIA: CPT

## 2025-01-10 PROCEDURE — 84484 ASSAY OF TROPONIN QUANT: CPT

## 2025-01-10 PROCEDURE — 93005 ELECTROCARDIOGRAM TRACING: CPT

## 2025-01-10 PROCEDURE — 74176 CT ABD & PELVIS W/O CONTRAST: CPT | Mod: MC

## 2025-01-10 PROCEDURE — 83935 ASSAY OF URINE OSMOLALITY: CPT

## 2025-01-10 PROCEDURE — 87640 STAPH A DNA AMP PROBE: CPT

## 2025-01-10 PROCEDURE — 84133 ASSAY OF URINE POTASSIUM: CPT

## 2025-01-10 PROCEDURE — 80053 COMPREHEN METABOLIC PANEL: CPT

## 2025-01-10 PROCEDURE — 71045 X-RAY EXAM CHEST 1 VIEW: CPT

## 2025-01-10 PROCEDURE — 84100 ASSAY OF PHOSPHORUS: CPT

## 2025-01-10 PROCEDURE — 99291 CRITICAL CARE FIRST HOUR: CPT

## 2025-01-10 PROCEDURE — 74230 X-RAY XM SWLNG FUNCJ C+: CPT

## 2025-01-10 PROCEDURE — 92611 MOTION FLUOROSCOPY/SWALLOW: CPT

## 2025-01-10 PROCEDURE — 80048 BASIC METABOLIC PNL TOTAL CA: CPT

## 2025-01-10 PROCEDURE — 83735 ASSAY OF MAGNESIUM: CPT

## 2025-01-10 PROCEDURE — 96374 THER/PROPH/DIAG INJ IV PUSH: CPT

## 2025-01-10 PROCEDURE — 82803 BLOOD GASES ANY COMBINATION: CPT

## 2025-01-10 PROCEDURE — 87449 NOS EACH ORGANISM AG IA: CPT

## 2025-01-10 PROCEDURE — 87641 MR-STAPH DNA AMP PROBE: CPT

## 2025-01-10 PROCEDURE — 83880 ASSAY OF NATRIURETIC PEPTIDE: CPT

## 2025-01-10 PROCEDURE — 94640 AIRWAY INHALATION TREATMENT: CPT

## 2025-01-10 PROCEDURE — 87637 SARSCOV2&INF A&B&RSV AMP PRB: CPT

## 2025-01-10 PROCEDURE — 97162 PT EVAL MOD COMPLEX 30 MIN: CPT

## 2025-01-10 PROCEDURE — 85379 FIBRIN DEGRADATION QUANT: CPT

## 2025-01-10 PROCEDURE — 85025 COMPLETE CBC W/AUTO DIFF WBC: CPT

## 2025-01-10 PROCEDURE — 36415 COLL VENOUS BLD VENIPUNCTURE: CPT

## 2025-01-10 PROCEDURE — 85027 COMPLETE CBC AUTOMATED: CPT

## 2025-01-10 PROCEDURE — 87086 URINE CULTURE/COLONY COUNT: CPT

## 2025-01-10 PROCEDURE — 81001 URINALYSIS AUTO W/SCOPE: CPT

## 2025-01-10 PROCEDURE — 92610 EVALUATE SWALLOWING FUNCTION: CPT

## 2025-01-10 PROCEDURE — 82570 ASSAY OF URINE CREATININE: CPT

## 2025-01-10 RX ORDER — AMOXICILLIN/POTASSIUM CLAV 875-125 MG
875 TABLET ORAL
Qty: 2 | Refills: 0
Start: 2025-01-10 | End: 2025-01-10

## 2025-01-10 RX ORDER — PREDNISONE 5 MG
1 TABLET ORAL
Qty: 30 | Refills: 0
Start: 2025-01-10 | End: 2025-02-08

## 2025-01-10 RX ORDER — METOPROLOL TARTRATE 50 MG
1 TABLET ORAL
Qty: 30 | Refills: 0
Start: 2025-01-10 | End: 2025-02-08

## 2025-01-10 RX ORDER — APIXABAN 5 MG/1
1 TABLET, FILM COATED ORAL
Qty: 0 | Refills: 0 | DISCHARGE
Start: 2025-01-10

## 2025-01-10 RX ORDER — APIXABAN 5 MG/1
1 TABLET, FILM COATED ORAL
Qty: 60 | Refills: 0
Start: 2025-01-10 | End: 2025-02-08

## 2025-01-10 RX ORDER — TORSEMIDE 20 MG/1
1 TABLET ORAL
Refills: 0 | DISCHARGE

## 2025-01-10 RX ORDER — LOSARTAN POTASSIUM 100 MG/1
1 TABLET, FILM COATED ORAL
Qty: 30 | Refills: 0
Start: 2025-01-10 | End: 2025-02-08

## 2025-01-10 RX ORDER — LOSARTAN POTASSIUM 100 MG/1
25 TABLET, FILM COATED ORAL DAILY
Refills: 0 | Status: DISCONTINUED | OUTPATIENT
Start: 2025-01-10 | End: 2025-01-10

## 2025-01-10 RX ORDER — AMLODIPINE BESYLATE 10 MG/1
1 TABLET ORAL
Refills: 0 | DISCHARGE

## 2025-01-10 RX ADMIN — Medication 2: at 08:37

## 2025-01-10 RX ADMIN — Medication 1: at 12:35

## 2025-01-10 RX ADMIN — Medication 100 MILLIGRAM(S): at 06:36

## 2025-01-10 RX ADMIN — Medication 12.5 MILLIGRAM(S): at 06:37

## 2025-01-10 RX ADMIN — METHYLPREDNISOLONE 40 MILLIGRAM(S): 4 TABLET ORAL at 06:36

## 2025-01-10 RX ADMIN — APIXABAN 2.5 MILLIGRAM(S): 5 TABLET, FILM COATED ORAL at 06:36

## 2025-01-10 NOTE — PROGRESS NOTE ADULT - PSYCHIATRIC
normal affect/normal behavior

## 2025-01-10 NOTE — PROGRESS NOTE ADULT - NECK
supple/symmetric/no tracheal deviation
supple/symmetric/no tracheal deviation/no thyromegaly
supple/symmetric/no tracheal deviation

## 2025-01-10 NOTE — PROGRESS NOTE ADULT - TONSILS
no redness/no swelling/no discharge

## 2025-01-10 NOTE — PROGRESS NOTE ADULT - SUBJECTIVE AND OBJECTIVE BOX
Patient is seen and examined at the bed side, is afebrile. She mentioned feeling better, saturating okay in room air.      REVIEW OF SYSTEMS: All other review systems are negative      ALLERGIES: No Known Allergies      Vital Signs Last 24 Hrs  T(C): 36.8 (10 Rocky 2025 12:58), Max: 36.8 (10 Rocky 2025 12:58)  T(F): 98.2 (10 Rocky 2025 12:58), Max: 98.2 (10 Rocky 2025 12:58)  HR: 68 (10 Rocky 2025 12:58) (68 - 99)  BP: 182/83 (10 Rocky 2025 12:58) (148/72 - 182/83)  BP(mean): --  RR: 17 (10 Rocky 2025 12:58) (17 - 18)  SpO2: 96% (10 Rocky 2025 12:58) (95% - 96%)    Parameters below as of 10 Rocky 2025 12:58  Patient On (Oxygen Delivery Method): room air        PHYSICAL EXAM:  GENERAL: Not in distress   CHEST/LUNG:  Air entry bilaterally  HEART: s1 and s2 present  ABDOMEN:  Nontender and  Nondistended  EXTREMITIES: No pedal  edema  CNS: Awake and Alert      LABS: No new Labs                         12.8   6.31  )-----------( 410      ( 08 Jan 2025 05:18 )             38.8                           12.8   6.31  )-----------( 410      ( 08 Jan 2025 05:18 )             38.8              01-09    131[L]  |  102  |  21[H]  ----------------------------<  193[H]  4.8   |  23  |  0.66    Ca    8.8      09 Jan 2025 05:20      01-08    132[L]  |  101  |  18  ----------------------------<  177[H]  5.0   |  22  |  0.55    Ca    8.7      08 Jan 2025 05:18    TPro  8.2  /  Alb  3.7  /  TBili  0.7  /  DBili  x   /  AST  36  /  ALT  25  /  AlkPhos  239[H]  01-02        CAPILLARY BLOOD GLUCOSE  POCT Blood Glucose.: 91 mg/dL (04 Jan 2025 11:35)  POCT Blood Glucose.: 84 mg/dL (04 Jan 2025 07:59)  POCT Blood Glucose.: 110 mg/dL (03 Jan 2025 21:28)  POCT Blood Glucose.: 147 mg/dL (03 Jan 2025 18:25)  POCT Blood Glucose.: 175 mg/dL (03 Jan 2025 16:21)        MEDICATIONS  (STANDING):    albuterol/ipratropium for Nebulization 3 milliLiter(s) Nebulizer every 6 hours  apixaban 2.5 milliGRAM(s) Oral every 12 hours  atorvastatin 10 milliGRAM(s) Oral at bedtime  cefepime   IVPB      cefepime   IVPB 1000 milliGRAM(s) IV Intermittent every 8 hours  insulin lispro (ADMELOG) corrective regimen sliding scale   SubCutaneous three times a day before meals  insulin lispro (ADMELOG) corrective regimen sliding scale   SubCutaneous at bedtime  methylPREDNISolone sodium succinate Injectable 40 milliGRAM(s) IV Push every 12 hours  metoprolol tartrate 12.5 milliGRAM(s) Oral two times a day      RADIOLOGY & ADDITIONAL TESTS:    1/6/25 : CT Chest No Cont (01.06.25 @ 13:49) >Multifocal pneumonia. Trace right pleural effusion.    No acute findings in the abdomen and pelvis.      1/2/25: Xray Chest 1 View- PORTABLE-Urgent (01.02.25 @ 17:57) >    IMPRESSION: No acute cardiopulmonary disease process. Cardiomegaly.        MICROBIOLOGY DATA:    Legionella pneumophila Antigen, Urine (01.05.25 @ 21:30)   Legionella Antigen, Urine: Negative:    MRSA/MSSA PCR (01.05.25 @ 21:30)   MRSA PCR Result.: NotDetec:    Culture - Blood in AM (01.05.25 @ 06:58)   Specimen Source: .Blood BLOOD  Culture Results: No growth at 4 days    Culture - Blood in AM (01.05.25 @ 06:50)   Specimen Source: .Blood BLOOD  Culture Results: No growth at 4 days     Procalcitonin (01.05.25 @ 06:50)   Procalcitonin: 1.88    FluA/FluB/RSV/COVID PCR (01.02.25 @ 17:12)   SARS-CoV-2 Result: NotDetec: This Respiratory Panel uses polymerase chain reaction (PCR) to detect for   influenza A; influenza B; and SARS-CoV-2.   This test was validated by [facility] and is in use under the FDA   Emergency Use Authorization (EUA) for clinical labs CLIA-certified to   perform high complexity testing. Test results should be correlated with   clinical presentation, patient history, and epidemiology.  Influenza A Result: Detected: notified dr annabel latham 01/02/2025 19:54:32 EST  Influenza B Result: NotDetec  Resp Syn Virus Result: NotDetec

## 2025-01-10 NOTE — PROGRESS NOTE ADULT - CARDIOVASCULAR
regular rate and rhythm/S1 S2 present/no gallops/no rub/no murmur/no JVD/normal PMI

## 2025-01-10 NOTE — DISCHARGE NOTE NURSING/CASE MANAGEMENT/SOCIAL WORK - FINANCIAL ASSISTANCE
[facility] provides services at a reduced cost to those who are determined to be eligible through [facility]’s financial assistance program. Information regarding [facility]’s financial assistance program can be found by going to [email or web address] or by calling [phone].

## 2025-01-10 NOTE — PROGRESS NOTE ADULT - ASSESSMENT
89 yr old female From home, ambulatory and independent, with hx of HTN, DM, HLD, CHF, CVA (> 15 years on plavix) presents c/o cough x 4 days and sob on day of admission,Flu+,PAF with RVR,hyponatremia,multifocal pneumonia.  1.Flu-s/p Tamiflu.  2.PAF-eliquis,low dose b blocker.  3.CVA-eliquis,statin.  4.Hyponatremia-Renal f/u.  5.HTN- resume cozaar.  6.DM-Insulin.  7.Lipid d/o-statin.  8.PPI.  9.Multifocal pneumonia-abx chamge to augmentin until 1/11/25,steroids tspper.ID f/u.  10.PT rec home PT.  11.D/C home and f/u as outpatient.

## 2025-01-10 NOTE — PROGRESS NOTE ADULT - SUBJECTIVE AND OBJECTIVE BOX
Date of Service 01-10-25 @ 13:28    CHIEF COMPLAINT:Patient is a 89y old  Female who presents with a chief complaint of influenza .Pt appears comfortable.  	  REVIEW OF SYSTEMS:  CONSTITUTIONAL: No fever, weight loss, or fatigue  EYES: No eye pain, visual disturbances, or discharge  ENT:  No difficulty hearing, tinnitus, vertigo; No sinus or throat pain  NECK: No pain or stiffness  RESPIRATORY: No cough, wheezing, chills or hemoptysis; No Shortness of Breath  CARDIOVASCULAR: No chest pain, palpitations, passing out, dizziness, or leg swelling  GASTROINTESTINAL: No abdominal or epigastric pain. No nausea, vomiting, or hematemesis; No diarrhea or constipation. No melena or hematochezia.  GENITOURINARY: No dysuria, frequency, hematuria, or incontinence  NEUROLOGICAL: No headaches, memory loss, loss of strength, numbness, or tremors  SKIN: No itching, burning, rashes, or lesions   LYMPH Nodes: No enlarged glands  ENDOCRINE: No heat or cold intolerance; No hair loss  MUSCULOSKELETAL: No joint pain or swelling; No muscle, back, or extremity pain  PSYCHIATRIC: No depression, anxiety, mood swings, or difficulty sleeping  HEME/LYMPH: No easy bruising, or bleeding gums  ALLERGY AND IMMUNOLOGIC: No hives or eczema	      PHYSICAL EXAM:  T(C): 36.8 (01-10-25 @ 12:58), Max: 36.8 (01-10-25 @ 12:58)  HR: 68 (01-10-25 @ 12:58) (68 - 99)  BP: 182/83 (01-10-25 @ 12:58) (148/72 - 182/83)  RR: 17 (01-10-25 @ 12:58) (17 - 18)  SpO2: 96% (01-10-25 @ 12:58) (95% - 96%)  Wt(kg): --  I&O's Summary      Appearance: Normal	  HEENT:   Normal oral mucosa, PERRL, EOMI	  Lymphatic: No lymphadenopathy  Cardiovascular: Normal S1 S2, No JVD, No murmurs, No edema  Respiratory: Lungs clear to auscultation	  Psychiatry: A & O x 3, Mood & affect appropriate  Gastrointestinal:  Soft, Non-tender, + BS	  Skin: No rashes, No ecchymoses, No cyanosis	  Neurologic: Non-focal  Extremities: Normal range of motion, No clubbing, cyanosis or edema  Vascular: Peripheral pulses palpable 2+ bilaterally    MEDICATIONS  (STANDING):  albuterol/ipratropium for Nebulization 3 milliLiter(s) Nebulizer every 6 hours  apixaban 2.5 milliGRAM(s) Oral every 12 hours  atorvastatin 10 milliGRAM(s) Oral at bedtime  cefepime   IVPB 1000 milliGRAM(s) IV Intermittent every 8 hours  cefepime   IVPB      insulin lispro (ADMELOG) corrective regimen sliding scale   SubCutaneous three times a day before meals  insulin lispro (ADMELOG) corrective regimen sliding scale   SubCutaneous at bedtime  methylPREDNISolone sodium succinate Injectable 40 milliGRAM(s) IV Push every 12 hours  metoprolol tartrate 12.5 milliGRAM(s) Oral two times a day        	  LABS:	 	                        12.4   7.32  )-----------( 584      ( 10 Rocky 2025 05:15 )             37.4     01-10    132[L]  |  102  |  26[H]  ----------------------------<  224[H]  5.1   |  23  |  0.75    Ca    8.9      10 Rocky 2025 05:15

## 2025-01-10 NOTE — DISCHARGE NOTE NURSING/CASE MANAGEMENT/SOCIAL WORK - PATIENT PORTAL LINK FT
You can access the FollowMyHealth Patient Portal offered by [facility] by registering at the following website: [email or web address]. By joining [facility]’s FollowMyHealth portal, you will also be able to view your health information using other applications (apps) compatible with our system.

## 2025-01-10 NOTE — PROGRESS NOTE ADULT - ASSESSMENT
LYNN - improved , possible pre renal , AF with RVR on admission.    Acute bronchitis ,multifocal pneumonia  positive to Influenza A improving .   Hyponatremia improving. Diarrhea resolved.

## 2025-01-10 NOTE — PROGRESS NOTE ADULT - ASSESSMENT
1. Diarrhea (improving)  2. colitis  3. Jejunal diverticulum  4. Diverticulosis with out diverticulitis     Suggestions:    1. Diet as tolerated  2. Protonix 40mg daily  3. Monitor electrolytes  4. Avoid NSAID  5. DVT prophylaxis

## 2025-01-10 NOTE — PROGRESS NOTE ADULT - PROVIDER SPECIALTY LIST ADULT
Infectious Disease
Infectious Disease
Internal Medicine
Infectious Disease
Infectious Disease
Internal Medicine
Nephrology
Nephrology
Infectious Disease
Internal Medicine
Cardiology
Gastroenterology
Gastroenterology
Infectious Disease
Internal Medicine
Internal Medicine
Nephrology
Gastroenterology
Internal Medicine

## 2025-01-10 NOTE — PROGRESS NOTE ADULT - SUBJECTIVE AND OBJECTIVE BOX
Problem List:  Hyponatremia  LYNN resolved  Multifocal Pneumonia.       PAST MEDICAL & SURGICAL HISTORY:  Diabetes      History of hypertension      Stroke      High cholesterol      Iron (Fe) deficiency anemia      Rectal mass      Congestive heart failure (CHF)          No Known Allergies      MEDICATIONS  (STANDING):  albuterol/ipratropium for Nebulization 3 milliLiter(s) Nebulizer every 6 hours  apixaban 2.5 milliGRAM(s) Oral every 12 hours  atorvastatin 10 milliGRAM(s) Oral at bedtime  cefepime   IVPB      cefepime   IVPB 1000 milliGRAM(s) IV Intermittent every 8 hours  insulin lispro (ADMELOG) corrective regimen sliding scale   SubCutaneous three times a day before meals  insulin lispro (ADMELOG) corrective regimen sliding scale   SubCutaneous at bedtime  methylPREDNISolone sodium succinate Injectable 40 milliGRAM(s) IV Push every 12 hours  metoprolol tartrate 12.5 milliGRAM(s) Oral two times a day    MEDICATIONS  (PRN):  acetaminophen     Tablet .. 650 milliGRAM(s) Oral every 6 hours PRN Temp greater or equal to 38C (100.4F), Mild Pain (1 - 3)  Biotene Dry Mouth Oral Rinse 15 milliLiter(s) Swish and Spit every 4 hours PRN Mouth Care  melatonin 3 milliGRAM(s) Oral at bedtime PRN Insomnia  ondansetron Injectable 4 milliGRAM(s) IV Push every 8 hours PRN Nausea and/or Vomiting                            12.4   7.32  )-----------( 584      ( 10 Rocky 2025 05:15 )             37.4     01-10    132[L]  |  102  |  26[H]  ----------------------------<  224[H]  5.1   |  23  |  0.75    Ca    8.9      10 Rocky 2025 05:15          Creatinine, Random Urine: 43 mg/dL (01-04 @ 08:05)  Potassium, Random Urine: 12 mmol/L (01-04 @ 08:05)  Sodium, Random Urine: 42 mmol/L (01-04 @ 08:05)  Osmolality, Random Urine: 415 mos/kg (01-04 @ 08:05)      REVIEW OF SYSTEMS:  General: no fever no chills, no weight loss.  EYES/ENT: No visual changes;  No vertigo, no headache.  NECK: No pain or stiffness  RESPIRATORY: No cough, wheezing, hemoptysis; No shortness of breath  CARDIOVASCULAR: No chest pain or palpitations. No Edema  GASTROINTESTINAL: No abdominal or epigastric pain. No nausea, vomiting. No diarrhea or constipation. No melena.  GENITOURINARY: No dysuria, frequency, foamy urine, urinary urgency, incontinence or hematuria  NEUROLOGICAL: No numbness or weakness, no tremor , no dizziness.   Muscle skeletal : no joint pain and no swelling of joints and limbs.  SKIN: No itching, burning, rashes.        VITALS:  T(F): 97.5 (01-10-25 @ 05:33), Max: 97.9 (01-09-25 @ 12:35)  HR: 80 (01-10-25 @ 05:33)  BP: 152/77 (01-10-25 @ 05:33)  RR: 18 (01-10-25 @ 05:33)  SpO2: 95% (01-10-25 @ 05:33)  Wt(kg): --      PHYSICAL EXAM:  Constitutional: well developed, no diaphoresis, no distress.  Neck: No JVD, no carotid bruit, supple, no adenopathy  Respiratory: Good air entrance B/L, no wheezes, rales or rhonchi  Cardiovascular: S1, S2, RRR, no pericardial rub, no murmur  Abdomen: BS+, soft, no tenderness, no bruit  Pelvis: bladder nondistended  Extremities: No cyanosis or clubbing. No peripheral edema.   Pulses: All present  Neurological: A/O x 3, no focal deficits  Psychiatric: Normal mood, normal affect  Skin: No rashes  Vascular Access:

## 2025-01-10 NOTE — PROGRESS NOTE ADULT - SUBJECTIVE AND OBJECTIVE BOX
[   ] ICU                                          [   ] CCU                                      [ X  ] Medical Floor    Patient is a 89 year old female with diarrhea. GI consulted to evaluate.         89 year old female from home, ambulatory and independent, with past medical history significant for HTN, DM, CHF, CVA, Rectal mass, Iron deficiency anemia, Hyperlipidemia, presented to the emergency room with 4 days history of SOB and cough. Patient was admitted with influenza. During the hospital course patient developed watery non bloody diarrhea. No abdominal pain, nausea, vomiting, hematemesis, hematochezia, melena, fever, chills, chest pain, SOB, cough, hematuria, dysuria, recent antibiotics intake or travelling reported.       Patient appears comfortable. No new complaints reported, No abdominal pain, N/V, hematemesis, hematochezia, melena, fever, chills, chest pain, SOB, cough or diarrhea reported.           PAIN MANAGEMENT:  Pain Scale:                 0/10  Pain Location:         PAST MEDICAL HISTORY    DM    HTN    CVA    High cholesterol    Iron (Fe) deficiency anemia    Rectal mass    Congestive heart failure (CHF)    Diverticulosis    Jejunum Diverticulom             PAST SURGICAL HISTORY    No significant past surgical history reported        Allergies    No Known Allergies    Intolerances  None        SOCIAL HISTORY  Advanced Directives:       [ X ] Full Code       [  ] DNR  Marital Status:         [  ] M      [ X ] S      [  ] D       [  ] W  Children:       [ X ] Yes      [  ] No  Occupation:        [  ] Employed       [ X ] Unemployed       [  ] Retired  Diet:       [ X ] Regular       [  ] PEG feeding          [  ] NG tube feeding  Drug Use:           [ X ] Patient denied          [  ] Yes  Alcohol:           [ X ] No             [  ] Yes (socially)         [  ] Yes (chronic)  Tobacco:           [  ] Yes           [ X ] No      FAMILY HISTORY  [X  ] Heart Disease            [ X ] Diabetes             [X  ] HTN             [  ] Colon Cancer             [  ] Stomach Cancer              [  ] Pancreatic Cancer        VITALS   Vital Signs Last 24 Hrs  T(C): 36.4 (01-10-25 @ 05:33), Max: 36.6 (01-09-25 @ 12:35)  T(F): 97.5 (01-10-25 @ 05:33), Max: 97.9 (01-09-25 @ 12:35)  HR: 80 (01-10-25 @ 05:33) (61 - 99)  BP: 152/77 (01-10-25 @ 05:33) (128/65 - 177/76)  BP(mean): 110 (01-09-25 @ 10:46) (89 - 110)  RR: 18 (01-10-25 @ 05:33) (18 - 18)  SpO2: 95% (01-10-25 @ 05:33) (95% - 97%)    MEDICATIONS  (STANDING):  albuterol/ipratropium for Nebulization 3 milliLiter(s) Nebulizer every 6 hours  apixaban 2.5 milliGRAM(s) Oral every 12 hours  atorvastatin 10 milliGRAM(s) Oral at bedtime  cefepime   IVPB      cefepime   IVPB 1000 milliGRAM(s) IV Intermittent every 8 hours  insulin lispro (ADMELOG) corrective regimen sliding scale   SubCutaneous three times a day before meals  insulin lispro (ADMELOG) corrective regimen sliding scale   SubCutaneous at bedtime  methylPREDNISolone sodium succinate Injectable 40 milliGRAM(s) IV Push every 12 hours  metoprolol tartrate 12.5 milliGRAM(s) Oral two times a day    MEDICATIONS  (PRN):  acetaminophen     Tablet .. 650 milliGRAM(s) Oral every 6 hours PRN Temp greater or equal to 38C (100.4F), Mild Pain (1 - 3)  Biotene Dry Mouth Oral Rinse 15 milliLiter(s) Swish and Spit every 4 hours PRN Mouth Care  melatonin 3 milliGRAM(s) Oral at bedtime PRN Insomnia  ondansetron Injectable 4 milliGRAM(s) IV Push every 8 hours PRN Nausea and/or Vomiting                            12.4   7.32  )-----------( 584      ( 10 Rocky 2025 05:15 )             37.4       01-10    132[L]  |  102  |  26[H]  ----------------------------<  224[H]  5.1   |  23  |  0.75    Ca    8.9      10 Rocky 2025 05:15

## 2025-01-10 NOTE — PROGRESS NOTE ADULT - REASON FOR ADMISSION
influenza

## 2025-01-10 NOTE — PROGRESS NOTE ADULT - GASTROINTESTINAL
soft/nontender/nondistended/normal active bowel sounds/no guarding/no organomegaly

## 2025-01-10 NOTE — PROGRESS NOTE ADULT - COMMENTS
Patient can not provide history

## 2025-01-10 NOTE — PROGRESS NOTE ADULT - ASSESSMENT
Patient is a 89y old  Female who is From home, ambulatory and independent, with hx of HTN, DM, HLD, CHF, CVA (> 15 years on plavix) presents c/o cough x 4 days and sob on day of admission.  In the emergency room she was started on BiPAP  Because of increased work of breathing and respiratory rate to 35, but did not tolerate it well. She was then transitioned to nasal cannula and is saturating well on 2 L.  She was also found to be in Afib with RVR to 193.  She was given Lopressor and her rate improve. She also found to have positive Influenza A, started on Tamiflu. And the ID consult requested to assist with further management.    # Sepsis ( Tachycardia + Leukocytosis + Hypoxia)- resolving  # Influenza A - s/p completed  the course of Tamiflu  # Multifocal pneumonia- most likely superimposed-  Elevated Procalcitonin level, 1.88 - MRSA PCR and Legionella urine Ag is negative    would recommend:    1. OOB to chair  2. Continue Cefepime while inpatient and may change to oral Augmentin 875mg q 12hours on discharge to continue until 1/11/25  3. Droplet Isolation  4. Aspiration precaution  5. Supplemental oxygenation and Bronchodilator as needed    d/w covering NP, [place]    Attending Attestation:    Spent more than 35 minutes on total encounter, more than 50 % of the visit was spent counseling and/or coordinating care by the Attending physician.

## 2025-01-10 NOTE — PROGRESS NOTE ADULT - MUSCULOSKELETAL
no joint erythema/no joint warmth/no calf tenderness
no joint erythema/no joint warmth/no calf tenderness
no joint swelling/no joint erythema/no joint warmth/no calf tenderness

## 2025-06-11 NOTE — PROGRESS NOTE ADULT - PROBLEM SELECTOR PLAN 7
[place]- Depressed, Depression is getting worse , sooner appointment with pcp  ----- Message from Jamil ZHU sent at 2025  7:30 AM CDT -----  Patient Name: Tammi Cheung    Specialist or PCP Name: Dr Key Escamilla     Symptoms: 33yF-[place]- Depressed, Depression is getting worse , sooner appointment with pcp     Pregnant (females aged 13-60. If Yes, how long?) : No     Call Back # : [phone]    Which State are you currently located in?: [place]     Name of [facility] Site : [facility]     Call arrived during: After Hours    
cont statin simvastatin 10
Last a1c was 6.3 on admission in march  has history of low BG so does not take her metformin  - ss insulin for now
Last a1c was 6.3 on admission in march  has history of low BG so does not take her metformin  - ss insulin for now

## 2025-08-28 NOTE — DISCHARGE NOTE NURSING/CASE MANAGEMENT/SOCIAL WORK - NSDCPEPT PROEDHF_GEN_ALL_CORE
Continued Stay SW/CM Assessment/Plan of Care Note         Active Substitute Decision Maker (SDM)    There are no active Substitute Decision Maker (SDM) on file.     Progress note:  CM notified by DEVORAH Moran and chart review that patient has 2 accepting REN[facility] and court date set for guardianship. However, anticipated legal guardian/son Artruo unwilling to pick one of the 2 accepting facilities. CM messaged Dr. Stahl to discuss and CM/SW management. Anticipate need for discharge order and HINN if son unwilling to pick facility. Will follow peripherally.    Per Dr. Stahl, he spoke with son last night and he \"didn't like [facility]\" and is touring facilities. Will await response from management before proceeding.    Per management, since son has had since 8/25/25 to pick a facility, ok to proceed with discharge order and providing HINN. Updated Dr. Stahl and requested dc order. Will provide HINN once DC order is placed.    Met with patient with family bedside, including anticipated guardian/son Arturo. CM discussed discharge plans with Arturo at length. He states he toured both accepting facilities, and they are both unacceptable. [facility] is worse than [facility]. Arturo said he did visit [facility] and is interested in patient going there. CM discussed plans to follow up with their pending referral and all other pending referrals. CM did discuss that while CM is empathetic, patient needs to discharge to an accepting facility.    DEVORAH Moran stating [facility] texted her cell with denial, but did not provide reason. CM asked her to please request reason. No update as of 1615. CM called main number, reached automated message with no option for admissions. All remaining referrals called for an update. Will follow.    Current Patient Status: Inpatient    See SW/CM flowsheets for other objective data.    Disposition Recommendations:  Preliminary discharge destination:    SW/CM 
recommendation for discharge: Home, 24 Hour assist, Home therapy    Destination Pharmacy:        Discharge Plan/Needs:     Continued Care and Services - Admitted Since 7/30/2025       Destination        Service Provider Request Status Services Address Phone Fax    [facility]  [facility] [address] [facility] [id number] [phone]    [facility] - [facility]  Accepted -- [address] [facility] [id number] [phone]    [facility]  Pending - Request Sent -- [address] [facility] [address] [phone]    [facility]  Pending - Request Sent -- [address] [address] [phone]    [facility]  Pending - Request Sent -- [address] [facility] [address] [phone]    [facility]  Pending - Request Sent -- [address] [facility] [id number] [phone]    [facility]  Pending - Request Sent -- [address] [facility] [id number] [phone]    [facility] - SNF  Pending - Request Sent -- [address] [facility] [id number] [phone]    [facility]  Pending - Request Sent -- [address] [facility] [id number] [phone]    [facility]  Declined [facility] [address] [id number] [phone]    [facility]  Declined -- [address] [address] [phone]    [facility]  Declined -- [address] [facility] [address] [phone]    [facility] - SNF  Declined -- [address] [place] [id number] [phone]    BLAINE 
[facility] - SNF  Declined -- [address] [facility] [address] [phone]    [facility]  Declined -- [address] [facility] [place] [id number] [phone]                  Continued Care and Services - Linked Episodes Includes continued care and service providers from the active episodes linked to this encounter, which are listed below      Care Transitions Episode start date: 8/1/2025   There are no active outsourced providers for this episode.                 Prior To Hospitalization:    Living Situation: Spouse, Adult children, Family members and residing at House    .  Support Systems: Children, Spouse   Home Devices/Equipment: Hospital bed, Mobility assist device            Mobility Assist Devices: Total lift, Wheelchair   Type of Service Prior to Hospitalization: Personal care worker (family), OT, PT               Patient/Family discharge goal (s):  Home, 24 Hour assist, Home therapy     Resources provided:           Prior Function                Current Function  Last Filed Values         Value Time User    Current Function  significantly below baseline level of function 8/27/2025  6:23 PM Eve Valencia PT    Therapy Impairments  activity tolerance; safety awareness; strength; balance; communication; motor planning; coordination; pain; cognition; executive functioning; ROM; proprioception; skin integrity; sensation 8/27/2025  6:23 PM Eve Valencia PT    ADLs Requiring Support  bed mobility; transfers 8/27/2025  6:23 PM Eve Valencia PT            Therapy Recommendations for Discharge:   PT:      Last Filed Values         Value Time User    PT Discharge Needs  therapy 5 or more times per week 8/27/2025  6:23 PM Eve Valencia PT          OT:      Last Filed Values         Value Time User    OT Discharge Needs  therapy 5 or more times per week 8/22/2025  2:44 PM Binta Cameron 
L, OT          SLP:    Last Filed Values         Value Time User    SLP Discharge Needs  does not require ongoing therapy 8/27/2025 11:39 AM Felipe Duvall, SLP            Mobility Equipment Recommended for Discharge: has hospital bed, manual ivania lift, wheelchair; family is requesting RENATA      Barriers to Discharge  Identified Barriers to Discharge/Transition Planning:   Clinical barriers:     External barriers:     Psychosocial barriers:                
Call primary care provider for follow up after discharge/Activities as tolerated/Low salt diet/Monitor weight daily/Report signs and symptoms to primary care provider
See HPI

## (undated) DEVICE — Device

## (undated) DEVICE — TUBING IV SET GRAVITY 3Y 100" MACRO

## (undated) DEVICE — TUBING CANNULA SALTER LABS NASAL ADULT 7FT

## (undated) DEVICE — PD GROUND 2 SURFIT AD LF

## (undated) DEVICE — NDL INJ SCLERO INTERJECT 23G

## (undated) DEVICE — BITE BLOCK MOUTHPCW/STRAP

## (undated) DEVICE — LUBE JELLY FOILPACK 36GM STERILE

## (undated) DEVICE — TUBING ENDO EXT OLYMPUS 160 24HR USE

## (undated) DEVICE — FORCEP RADIAL JAW 4 W NDL 2.2MM 2.8MM 240CM ORANGE DISP

## (undated) DEVICE — FORMALIN CUPS 10% BUFFERED

## (undated) DEVICE — CLAMP BX HOT RAD JAW 3

## (undated) DEVICE — CATH ELCTR GLIDE PRB 7FR

## (undated) DEVICE — ADAPTER ENDO CHNL SINGLE USE

## (undated) DEVICE — SOL INJ NS 0.9% 500ML 1-PORT

## (undated) DEVICE — DRSG GAUZE 4X4"

## (undated) DEVICE — SENSOR O2 FINGER ADULT 24/CA

## (undated) DEVICE — FORCEP BIOPSY 2.5MM DISP

## (undated) DEVICE — PROBE FIAPC CIRC O.D. 2.3MM/6.9FR LNTH 220CM/7.2FT

## (undated) DEVICE — KIT ENDO PROCEDURE CUST W/VLV

## (undated) DEVICE — TUBING MEDI-VAC W MAXIGRIP CONNECTORS 1/4"X6'

## (undated) DEVICE — MASK OXYGEN PANORAMIC

## (undated) DEVICE — SNARE LOOP POLY DISP 30MM LOOP

## (undated) DEVICE — VALVE ENDOSCOPE DEFENDO SINGLE USE

## (undated) DEVICE — SOLIDIFIER ISOLYZER 2000 CC

## (undated) DEVICE — SYR LUER LOK 50CC

## (undated) DEVICE — RETRIEVER ROTH NET PLATINUM-UNIVERSAL